# Patient Record
Sex: FEMALE | Race: BLACK OR AFRICAN AMERICAN | NOT HISPANIC OR LATINO | Employment: FULL TIME | URBAN - METROPOLITAN AREA
[De-identification: names, ages, dates, MRNs, and addresses within clinical notes are randomized per-mention and may not be internally consistent; named-entity substitution may affect disease eponyms.]

---

## 2017-05-03 ENCOUNTER — PATIENT MESSAGE (OUTPATIENT)
Dept: FAMILY MEDICINE | Facility: CLINIC | Age: 57
End: 2017-05-03

## 2017-05-03 ENCOUNTER — OFFICE VISIT (OUTPATIENT)
Dept: FAMILY MEDICINE | Facility: CLINIC | Age: 57
End: 2017-05-03
Payer: COMMERCIAL

## 2017-05-03 ENCOUNTER — LAB VISIT (OUTPATIENT)
Dept: LAB | Facility: HOSPITAL | Age: 57
End: 2017-05-03
Attending: INTERNAL MEDICINE
Payer: COMMERCIAL

## 2017-05-03 VITALS
HEART RATE: 71 BPM | BODY MASS INDEX: 29.1 KG/M2 | DIASTOLIC BLOOD PRESSURE: 96 MMHG | HEIGHT: 68 IN | TEMPERATURE: 98 F | WEIGHT: 192 LBS | OXYGEN SATURATION: 96 % | SYSTOLIC BLOOD PRESSURE: 148 MMHG

## 2017-05-03 DIAGNOSIS — Z00.00 ROUTINE MEDICAL EXAM: Primary | ICD-10-CM

## 2017-05-03 DIAGNOSIS — Z00.00 ROUTINE MEDICAL EXAM: ICD-10-CM

## 2017-05-03 DIAGNOSIS — K21.9 GERD WITHOUT ESOPHAGITIS: ICD-10-CM

## 2017-05-03 DIAGNOSIS — E78.00 PURE HYPERCHOLESTEROLEMIA: ICD-10-CM

## 2017-05-03 DIAGNOSIS — L30.9 DERMATITIS: ICD-10-CM

## 2017-05-03 DIAGNOSIS — E66.3 OVERWEIGHT (BMI 25.0-29.9): ICD-10-CM

## 2017-05-03 DIAGNOSIS — I10 ESSENTIAL (PRIMARY) HYPERTENSION: ICD-10-CM

## 2017-05-03 LAB
ALBUMIN SERPL BCP-MCNC: 3.9 G/DL
ALP SERPL-CCNC: 90 U/L
ALT SERPL W/O P-5'-P-CCNC: 13 U/L
ANION GAP SERPL CALC-SCNC: 6 MMOL/L
AST SERPL-CCNC: 18 U/L
BASOPHILS # BLD AUTO: 0.02 K/UL
BASOPHILS NFR BLD: 0.4 %
BILIRUB SERPL-MCNC: 0.6 MG/DL
BUN SERPL-MCNC: 13 MG/DL
CALCIUM SERPL-MCNC: 9.8 MG/DL
CHLORIDE SERPL-SCNC: 108 MMOL/L
CHOLEST/HDLC SERPL: 3.4 {RATIO}
CO2 SERPL-SCNC: 28 MMOL/L
CREAT SERPL-MCNC: 0.9 MG/DL
DIFFERENTIAL METHOD: ABNORMAL
EOSINOPHIL # BLD AUTO: 0.2 K/UL
EOSINOPHIL NFR BLD: 4.6 %
ERYTHROCYTE [DISTWIDTH] IN BLOOD BY AUTOMATED COUNT: 16.6 %
EST. GFR  (AFRICAN AMERICAN): >60 ML/MIN/1.73 M^2
EST. GFR  (NON AFRICAN AMERICAN): >60 ML/MIN/1.73 M^2
GLUCOSE SERPL-MCNC: 88 MG/DL
HCT VFR BLD AUTO: 37.7 %
HDL/CHOLESTEROL RATIO: 29.3 %
HDLC SERPL-MCNC: 242 MG/DL
HDLC SERPL-MCNC: 71 MG/DL
HGB BLD-MCNC: 11.6 G/DL
LDLC SERPL CALC-MCNC: 152.4 MG/DL
LYMPHOCYTES # BLD AUTO: 1.5 K/UL
LYMPHOCYTES NFR BLD: 30.8 %
MCH RBC QN AUTO: 23 PG
MCHC RBC AUTO-ENTMCNC: 30.8 %
MCV RBC AUTO: 75 FL
MONOCYTES # BLD AUTO: 0.4 K/UL
MONOCYTES NFR BLD: 9.1 %
NEUTROPHILS # BLD AUTO: 2.7 K/UL
NEUTROPHILS NFR BLD: 55.1 %
NONHDLC SERPL-MCNC: 171 MG/DL
PLATELET # BLD AUTO: 310 K/UL
PMV BLD AUTO: 10.6 FL
POTASSIUM SERPL-SCNC: 4.5 MMOL/L
PROT SERPL-MCNC: 7.4 G/DL
RBC # BLD AUTO: 5.05 M/UL
SODIUM SERPL-SCNC: 142 MMOL/L
TRIGL SERPL-MCNC: 93 MG/DL
WBC # BLD AUTO: 4.83 K/UL

## 2017-05-03 PROCEDURE — 99396 PREV VISIT EST AGE 40-64: CPT | Mod: 25,S$GLB,, | Performed by: INTERNAL MEDICINE

## 2017-05-03 PROCEDURE — 99999 PR PBB SHADOW E&M-EST. PATIENT-LVL III: CPT | Mod: PBBFAC,,, | Performed by: INTERNAL MEDICINE

## 2017-05-03 PROCEDURE — 36415 COLL VENOUS BLD VENIPUNCTURE: CPT | Mod: PO

## 2017-05-03 PROCEDURE — 85025 COMPLETE CBC W/AUTO DIFF WBC: CPT

## 2017-05-03 PROCEDURE — 3077F SYST BP >= 140 MM HG: CPT | Mod: S$GLB,,, | Performed by: INTERNAL MEDICINE

## 2017-05-03 PROCEDURE — 80053 COMPREHEN METABOLIC PANEL: CPT

## 2017-05-03 PROCEDURE — 80061 LIPID PANEL: CPT

## 2017-05-03 PROCEDURE — 3080F DIAST BP >= 90 MM HG: CPT | Mod: S$GLB,,, | Performed by: INTERNAL MEDICINE

## 2017-05-03 RX ORDER — PANTOPRAZOLE SODIUM 20 MG/1
20 TABLET, DELAYED RELEASE ORAL DAILY
Qty: 90 TABLET | Refills: 3 | Status: SHIPPED | OUTPATIENT
Start: 2017-05-03 | End: 2017-06-05

## 2017-05-03 RX ORDER — LISINOPRIL 40 MG/1
40 TABLET ORAL DAILY
Qty: 90 TABLET | Refills: 3 | Status: SHIPPED | OUTPATIENT
Start: 2017-05-03 | End: 2018-05-08 | Stop reason: SDUPTHER

## 2017-05-03 RX ORDER — CARVEDILOL 3.12 MG/1
3.12 TABLET ORAL 2 TIMES DAILY WITH MEALS
Qty: 180 TABLET | Refills: 0 | Status: SHIPPED | OUTPATIENT
Start: 2017-05-03 | End: 2017-05-17

## 2017-05-03 RX ORDER — AMLODIPINE BESYLATE 10 MG/1
10 TABLET ORAL DAILY
Qty: 90 TABLET | Refills: 3 | Status: SHIPPED | OUTPATIENT
Start: 2017-05-03 | End: 2018-05-08 | Stop reason: SDUPTHER

## 2017-05-03 NOTE — MR AVS SNAPSHOT
[facility]  [address]  [place] [address]  Phone: [phone]  Fax: [phone]                  Kendra Griffin   5/3/2017 7:40 AM   Office Visit    Description:  Female : 1960   Provider:  Armando Knox MD   Department:  [facility]           Reason for Visit     Annual Exam           Diagnoses this Visit        Comments    Routine medical exam    -  Primary     Essential (primary) hypertension         GERD without esophagitis         Pure hypercholesterolemia         Overweight (BMI 25.0-29.9)                To Do List           Future Appointments        Provider Department Dept Phone    5/3/2017 8:30 AM LAB, [place] [facility] [phone]    2017 7:40 AM Markie Arguello NP [facility] [phone]      Goals (5 Years of Data)     None      Follow-Up and Disposition     Return in about 2 weeks (around 2017) for BP check with NP.    Follow-up and Disposition History       These Medications        Disp Refills Start End    lisinopril (PRINIVIL,ZESTRIL) 40 MG tablet 90 tablet 3 5/3/2017     Take 1 tablet (40 mg total) by mouth once daily. - Oral    Pharmacy: [address] Ph #: [phone]       amlodipine (NORVASC) 10 MG tablet 90 tablet 3 5/3/2017     Take 1 tablet (10 mg total) by mouth once daily. - Oral    Pharmacy: [facility] [address] [place] [address] Ph #: [phone]       carvedilol (COREG) 3.125 MG tablet 180 tablet 0 5/3/2017 5/3/2018    Take 1 tablet (3.125 mg total) by mouth 2 (two) times daily with meals. - Oral    Pharmacy: [address] Ph #: [phone]       pantoprazole (PROTONIX) 20 MG tablet 90 tablet 3 5/3/2017     Take 1 tablet (20 mg total) by mouth once daily. - Oral    Pharmacy: [address] Ph #: [phone]          [facility]     [facility] - 24/7 Assistance  Unless otherwise directed by your provider, please contact [facility], our nurse care line that is available for 24/7 assistance.     Registered nurses in the [facility] provide: appointment scheduling, clinical advisement, health education, and other advisory services.  Call: [phone] (toll free)               Medications           Message regarding Medications     Verify the changes and/or additions to your medication regime listed below are the same as discussed with your clinician today.  If any of these changes or additions are incorrect, please notify your healthcare provider.        STOP taking these medications     escitalopram oxalate (LEXAPRO) 20 MG tablet Take 1 tablet (20 mg total) by mouth once daily. Crack 1st 3 tabs in half and take half tab daily for 6 days then increase to full tab    clonazePAM (KLONOPIN) 0.5 MG tablet Take 1 tablet (0.5 mg total) by mouth 2 (two) times daily as needed for Anxiety.           Verify that the below list of medications is an accurate representation of the medications you are currently taking.  If none reported, the list may be blank. If incorrect, please contact your healthcare provider. Carry this list with you in case of emergency.           Current Medications     amlodipine (NORVASC) 10 MG tablet Take 1 tablet (10 mg total) by mouth once daily.    aspirin 81 MG Chew Take 81 mg by mouth once daily.    carvedilol (COREG) 3.125 MG tablet Take 1 tablet (3.125 mg total) by mouth 2 (two) times daily with meals.    dicyclomine (BENTYL) 20 mg tablet Take 1 tablet (20 mg total) by mouth 4 (four) times daily as needed (abdominal cramping).    lisinopril (PRINIVIL,ZESTRIL) 40 MG tablet Take 1 tablet (40 mg total) by mouth once daily.    pantoprazole (PROTONIX) 20 MG tablet Take 1 tablet (20 mg total) by mouth once daily.    atorvastatin (LIPITOR) 20 MG tablet Take 1 tablet (20 mg  "total) by mouth once daily.    fluocinonide (LIDEX) 0.05 % ointment Apply topically 2 (two) times daily. Apply thin amount to arms only.  Do Not Apply to other locations           Clinical Reference Information           Your Vitals Were     BP Pulse Temp Height Weight SpO2    148/96 71 97.9 °F (36.6 °C) (Oral) 5' 7.5" (1.715 m) 87.1 kg (192 lb 0.3 oz) 96%    BMI                29.63 kg/m2          Blood Pressure          Most Recent Value    BP  (!)  148/96      Allergies as of 5/3/2017     No Known Allergies      Immunizations Administered on Date of Encounter - 5/3/2017     None      Orders Placed During Today's Visit     Future Labs/Procedures Expected by Expires    CBC auto differential  5/3/2017 5/3/2018    Comprehensive metabolic panel  5/3/2017 5/3/2018    Lipid panel  5/3/2017 5/3/2018      Instructions    The lisinopril and the amlodipine is once a day.     The carvedilol is twice a day.      We are trying to stop the fluid pill entirely.      I will be in touch about the lab results.     If your home blood pressures are not <140/90, then start taking 2 carvedilol tabs in the AM and 2 tabs in the PM       Language Assistance Services     ATTENTION: Language assistance services are available, free of charge. Please call [phone].      ATENCIÓN: Si habla ashañol, tiene a aaron disposición servicios gratuitos de asistencia lingüística. Llame al [phone].     CHÚ Ý: N?u b?n nói Ti?ng Vi?t, có các d?ch v? h? tr? ngôn ng? mi?n phí dành cho b?n. G?i s? [phone].         [facility] complies with applicable Federal civil rights laws and does not discriminate on the basis of race, color, national origin, age, disability, or sex.        "

## 2017-05-03 NOTE — ASSESSMENT & PLAN NOTE
Counseled on need to take all of her medication as directed and read the labels on her scripts.  She is interested in the digital HTN program if cuff affordable.  She will return to see VIGNESH in 2 weeks for BP check.  I counseled her on increasing her BB if needed.  Avoiding diuretic due to issues at work with urinary frequency.
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
## 2017-05-03 NOTE — PATIENT INSTRUCTIONS
The lisinopril and the amlodipine is once a day.     The carvedilol is twice a day.      We are trying to stop the fluid pill entirely.      I will be in touch about the lab results.     If your home blood pressures are not <140/90, then start taking 2 carvedilol tabs in the AM and 2 tabs in the PM

## 2017-05-04 NOTE — PROGRESS NOTES
Your lab results are improving.  It is ok to hold the cholesterol medicine (atorvastatin for now)  Keep up the great work with diet and exercise; this is helping!  Please feel free to contact me with any questions or concerns.    Sincerely,  Armando Knox  [email or web address]

## 2017-05-08 ENCOUNTER — PATIENT MESSAGE (OUTPATIENT)
Dept: ADMINISTRATIVE | Facility: OTHER | Age: 57
End: 2017-05-08

## 2017-05-10 ENCOUNTER — PATIENT OUTREACH (OUTPATIENT)
Dept: OTHER | Facility: OTHER | Age: 57
End: 2017-05-10

## 2017-05-10 NOTE — LETTER
Michelle Schumacher  [address] [place] [address]     Dear Kendra,    Thank you for enrolling in the [facility]. To participate in our program, we ask that you submit a blood pressure reading at least once weekly through your [facility] Account and maintain regular contact with your Care Team.  We have not received any data or heard from you in some time.     The Digital Medicine Care Team has attempted to reach you on multiple occasions to determine if you would like to continue participating in the program. While we encourage you to continue participating fully, we understand that circumstances may change.      To continue participating in the program, please contact me at [phone]. If we do not hear back, you will be un-enrolled and your physician will be notified of your decision.    If you have submitted blood pressure readings during the past 1 days and believe you are receiving this letter in error, please call the Digital Medicine Patient Support Line at [phone] for troubleshooting.      We look forward to hearing from you soon.    Sincerely,     Michelle Schumacher  Your Personal Health                                                                                                                         Kendra Griffin  [address] [place] [address]

## 2017-05-10 NOTE — PROGRESS NOTES
Last 5 Patient Entered Readings                                                               Current 30 Day Average: /     Recent Readings 5/9/2017 5/9/2017 5/8/2017 5/8/2017    Systolic BP (mmHg) 129 119 143 118    Diastolic BP (mmHg) 88 86 86 89    Pulse 75 82 72 65      Called to introduce Ms. Griffin to introduce her to the Bp monitoring program. Phone rang and then had busy signal. Will try again later this week.

## 2017-05-12 ENCOUNTER — TELEPHONE (OUTPATIENT)
Dept: FAMILY MEDICINE | Facility: CLINIC | Age: 57
End: 2017-05-12

## 2017-05-12 NOTE — PROGRESS NOTES
Last 5 Patient Entered Readings                                                               Current 30 Day Average: /     Recent Readings 5/11/2017 5/11/2017 5/10/2017 5/9/2017 5/9/2017    Systolic BP (mmHg) 140 138 149 129 119    Diastolic BP (mmHg) 80 90 90 88 86    Pulse 79 63 64 75 82      Phone number listed still beeping busy. Will attempt to contact via [employer] for introduction.

## 2017-05-12 NOTE — LETTER
May 12, 2017    [place] [id number]             [facility]  [address]  [place] [address]  Phone: [phone]  Fax: [phone] Dear Ms. Griffin:    I have been unable to reach you by phone for your appointment to Dermatology. Please call me at the clinic [phone] to book your appointment.        If you have any questions or concerns, please don't hesitate to call.    Sincerely,        Manisha Petit MA

## 2017-05-17 ENCOUNTER — OFFICE VISIT (OUTPATIENT)
Dept: FAMILY MEDICINE | Facility: CLINIC | Age: 57
End: 2017-05-17
Payer: COMMERCIAL

## 2017-05-17 VITALS
OXYGEN SATURATION: 98 % | HEIGHT: 67 IN | DIASTOLIC BLOOD PRESSURE: 84 MMHG | BODY MASS INDEX: 29.83 KG/M2 | SYSTOLIC BLOOD PRESSURE: 128 MMHG | WEIGHT: 190.06 LBS | TEMPERATURE: 98 F | HEART RATE: 79 BPM

## 2017-05-17 DIAGNOSIS — I10 ESSENTIAL (PRIMARY) HYPERTENSION: Primary | ICD-10-CM

## 2017-05-17 DIAGNOSIS — R09.89 PULSE IRREGULARITY: ICD-10-CM

## 2017-05-17 PROCEDURE — 99213 OFFICE O/P EST LOW 20 MIN: CPT | Mod: S$GLB,,, | Performed by: NURSE PRACTITIONER

## 2017-05-17 PROCEDURE — 3074F SYST BP LT 130 MM HG: CPT | Mod: S$GLB,,, | Performed by: NURSE PRACTITIONER

## 2017-05-17 PROCEDURE — 93010 ELECTROCARDIOGRAM REPORT: CPT | Mod: S$GLB,,, | Performed by: INTERNAL MEDICINE

## 2017-05-17 PROCEDURE — 3079F DIAST BP 80-89 MM HG: CPT | Mod: S$GLB,,, | Performed by: NURSE PRACTITIONER

## 2017-05-17 PROCEDURE — 93005 ELECTROCARDIOGRAM TRACING: CPT | Mod: S$GLB,,, | Performed by: NURSE PRACTITIONER

## 2017-05-17 PROCEDURE — 99999 PR PBB SHADOW E&M-EST. PATIENT-LVL IV: CPT | Mod: PBBFAC,,, | Performed by: NURSE PRACTITIONER

## 2017-05-17 PROCEDURE — 1160F RVW MEDS BY RX/DR IN RCRD: CPT | Mod: S$GLB,,, | Performed by: NURSE PRACTITIONER

## 2017-05-17 NOTE — PATIENT INSTRUCTIONS
Continue to Monitor blood pressure.  Continue diet and exercise to control blood pressure.  Avoid salt. Avoid fried and fatty foods.

## 2017-05-17 NOTE — MR AVS SNAPSHOT
[facility]  [address]  [place] [address]  Phone: [phone]  Fax: [phone]                  Kendra Griffin   2017 7:40 AM   Office Visit    Description:  Female : 1960   Provider:  Markie Arguello NP   Department:  [facility]           Reason for Visit     Hypertension           Diagnoses this Visit        Comments    Essential (primary) hypertension    -  Primary     Pulse irregularity                To Do List           Goals (5 Years of Data)              Today    17    Blood Pressure <= 140/90   128/84  128/84  128/84    Notes - Note created  5/10/2017  9:50 AM by Michelle ABARCA    It is important to consistently maintain a controlled blood pressure.      Exercise at least 150 minutes per week.           Maintain a low sodium diet           Notes - Note created  5/10/2017  9:58 AM by Michelle ABARCA    Limit to 2,000 mg sodium per day total.       Take at least one BP reading per week at various times of the day             Follow-Up and Disposition     Return in about 3 months (around 2017).      [facility]     [facility] -  Assistance  Unless otherwise directed by your provider, please contact [facility], our nurse care line that is available for  assistance.     Registered nurses in the [facility] provide: appointment scheduling, clinical advisement, health education, and other advisory services.  Call: [phone] (toll free)               Medications           Message regarding Medications     Verify the changes and/or additions to your medication regime listed below are the same as discussed with your clinician today.  If any of these changes or additions are incorrect, please notify your healthcare provider.        STOP taking these medications     carvedilol (COREG) 3.125 MG tablet Take 1 tablet (3.125 mg total) by mouth 2 (two) times daily with meals.     "       Verify that the below list of medications is an accurate representation of the medications you are currently taking.  If none reported, the list may be blank. If incorrect, please contact your healthcare provider. Carry this list with you in case of emergency.           Current Medications     amlodipine (NORVASC) 10 MG tablet Take 1 tablet (10 mg total) by mouth once daily.    aspirin 81 MG Chew Take 81 mg by mouth once daily.    atorvastatin (LIPITOR) 20 MG tablet Take 1 tablet (20 mg total) by mouth once daily.    dicyclomine (BENTYL) 20 mg tablet Take 1 tablet (20 mg total) by mouth 4 (four) times daily as needed (abdominal cramping).    lisinopril (PRINIVIL,ZESTRIL) 40 MG tablet Take 1 tablet (40 mg total) by mouth once daily.    pantoprazole (PROTONIX) 20 MG tablet Take 1 tablet (20 mg total) by mouth once daily.    fluocinonide (LIDEX) 0.05 % ointment Apply topically 2 (two) times daily. Apply thin amount to arms only.  Do Not Apply to other locations           Clinical Reference Information           Your Vitals Were     BP Pulse Temp Height Weight SpO2    128/84 (BP Location: Right arm, Patient Position: Sitting, BP Method: Manual) 79 98.2 °F (36.8 °C) 5' 7" (1.702 m) 86.2 kg (190 lb 0.6 oz) 98%    BMI                29.76 kg/m2          Blood Pressure          Most Recent Value    BP  128/84      Allergies as of 5/17/2017     No Known Allergies      Immunizations Administered on Date of Encounter - 5/17/2017     None      Orders Placed During Today's Visit      Normal Orders This Visit    EKG 12-lead       Instructions    Continue to Monitor blood pressure.  Continue diet and exercise to control blood pressure.  Avoid salt. Avoid fried and fatty foods.        Language Assistance Services     ATTENTION: Language assistance services are available, free of charge. Please call [phone].      ATENCIÓN: Si habla bill, tiene a aaron disposición servicios gratuitos de asistencia lingüística. Llame al " [phone].     [place] Ý: N?u b?n nói Ti?ng Vi?t, có các d?ch v? h? tr? ngôn ng? mi?n phí dành cho b?n. G?i s? [phone].         [facility] complies with applicable Federal civil rights laws and does not discriminate on the basis of race, color, national origin, age, disability, or sex.
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
## 2017-05-19 NOTE — PROGRESS NOTES
Last 5 Patient Entered Readings                                                               Current 30 Day Average: /     Recent Readings 5/18/2017 5/18/2017 5/17/2017 5/17/2017 5/16/2017    Systolic BP (mmHg) 125 128 120 133 143    Diastolic BP (mmHg) 90 95 81 91 97    Pulse 80 75 69 71 72      After multiple attempts, [facility] has still not been able to reach pt for introduction. She will be sent a letter today and have 2 weeks to respond before being removed from the program.

## 2017-05-22 ENCOUNTER — PATIENT MESSAGE (OUTPATIENT)
Dept: FAMILY MEDICINE | Facility: CLINIC | Age: 57
End: 2017-05-22

## 2017-05-22 DIAGNOSIS — I49.9 CARDIAC ARRHYTHMIA, UNSPECIFIED CARDIAC ARRHYTHMIA TYPE: Primary | ICD-10-CM

## 2017-05-23 ENCOUNTER — PATIENT MESSAGE (OUTPATIENT)
Dept: FAMILY MEDICINE | Facility: CLINIC | Age: 57
End: 2017-05-23

## 2017-05-24 ENCOUNTER — TELEPHONE (OUTPATIENT)
Dept: FAMILY MEDICINE | Facility: CLINIC | Age: 57
End: 2017-05-24

## 2017-05-24 NOTE — LETTER
May 25, 2017    [place] [id number]             [facility]  [address]  [place] [address]  Phone: [phone]  Fax: [phone] Dear Ms. Griffin:    Sorry we were unable to contact you to schedule your cardiology appointment. Please give the referral office a call to schedule. [phone].        If you have any questions or concerns, please don't hesitate to call.    Sincerely,        Alejandrina Peters MA

## 2017-06-05 ENCOUNTER — INITIAL CONSULT (OUTPATIENT)
Dept: CARDIOLOGY | Facility: CLINIC | Age: 57
End: 2017-06-05
Payer: COMMERCIAL

## 2017-06-05 VITALS
BODY MASS INDEX: 29.98 KG/M2 | HEIGHT: 67 IN | HEART RATE: 77 BPM | SYSTOLIC BLOOD PRESSURE: 157 MMHG | OXYGEN SATURATION: 96 % | DIASTOLIC BLOOD PRESSURE: 92 MMHG | WEIGHT: 191 LBS

## 2017-06-05 DIAGNOSIS — I10 ESSENTIAL (PRIMARY) HYPERTENSION: ICD-10-CM

## 2017-06-05 DIAGNOSIS — R94.31 ABNORMAL EKG: ICD-10-CM

## 2017-06-05 DIAGNOSIS — E78.2 MIXED HYPERLIPIDEMIA: ICD-10-CM

## 2017-06-05 DIAGNOSIS — R06.02 SHORTNESS OF BREATH: Primary | ICD-10-CM

## 2017-06-05 DIAGNOSIS — R42 DIZZINESS: ICD-10-CM

## 2017-06-05 PROCEDURE — 99999 PR PBB SHADOW E&M-EST. PATIENT-LVL III: CPT | Mod: PBBFAC,,, | Performed by: INTERNAL MEDICINE

## 2017-06-05 PROCEDURE — 99204 OFFICE O/P NEW MOD 45 MIN: CPT | Mod: S$GLB,,, | Performed by: INTERNAL MEDICINE
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
## 2017-06-05 NOTE — LETTER
June 5, 2017      Markie Arguello, ANMOL  [address]  [place] [address]           [facility]  [address]  [place] [address]  Phone: [phone]          Patient: Kendra Griffin   MR Number: [id number]   YOB: 1960   Date of Visit: 6/5/2017       Dear Markie Arguello:    Thank you for referring Kendra Griffin to me for evaluation. Attached you will find relevant portions of my assessment and plan of care.    If you have questions, please do not hesitate to call me. I look forward to following Kendra Griffin along with you.    Sincerely,    Silver Ayala MD    Enclosure  CC:  No Recipients    If you would like to receive this communication electronically, please contact [email or web address] or [phone] to request more information on [employer] Link access.    For providers and/or their staff who would like to refer a patient to [facility], please contact us through our one-stop-shop provider referral line, [facility] , at [phone].    If you feel you have received this communication in error or would no longer like to receive these types of communications, please e-mail [email or web address]

## 2017-06-15 ENCOUNTER — HOSPITAL ENCOUNTER (OUTPATIENT)
Dept: CARDIOLOGY | Facility: HOSPITAL | Age: 57
Discharge: HOME | End: 2017-06-15
Attending: INTERNAL MEDICINE
Payer: COMMERCIAL

## 2017-06-15 ENCOUNTER — HOSPITAL ENCOUNTER (OUTPATIENT)
Dept: RADIOLOGY | Facility: HOSPITAL | Age: 57
Discharge: HOME | End: 2017-06-15
Attending: INTERNAL MEDICINE
Payer: COMMERCIAL

## 2017-06-15 DIAGNOSIS — R06.02 SHORTNESS OF BREATH: ICD-10-CM

## 2017-06-15 DIAGNOSIS — R42 DIZZINESS: ICD-10-CM

## 2017-06-15 DIAGNOSIS — R94.31 ABNORMAL EKG: ICD-10-CM

## 2017-06-15 LAB
AORTIC VALVE REGURGITATION: NORMAL
DIASTOLIC DYSFUNCTION: NO
ESTIMATED PA SYSTOLIC PRESSURE: 16.48
GLOBAL PERICARDIAL EFFUSION: NORMAL
MITRAL VALVE MOBILITY: NORMAL
MITRAL VALVE REGURGITATION: NORMAL
RETIRED EF AND QEF - SEE NOTES: 60 (ref 55–65)
TRICUSPID VALVE REGURGITATION: NORMAL

## 2017-06-15 PROCEDURE — 93306 TTE W/DOPPLER COMPLETE: CPT

## 2017-06-15 PROCEDURE — 93018 CV STRESS TEST I&R ONLY: CPT | Mod: ,,, | Performed by: INTERNAL MEDICINE

## 2017-06-15 PROCEDURE — 78452 HT MUSCLE IMAGE SPECT MULT: CPT | Mod: TC

## 2017-06-15 PROCEDURE — 93016 CV STRESS TEST SUPVJ ONLY: CPT | Mod: ,,, | Performed by: INTERNAL MEDICINE

## 2017-06-15 PROCEDURE — 93225 XTRNL ECG REC<48 HRS REC: CPT

## 2017-06-15 PROCEDURE — 93306 TTE W/DOPPLER COMPLETE: CPT | Mod: 26,,, | Performed by: INTERNAL MEDICINE

## 2017-06-15 PROCEDURE — 93227 XTRNL ECG REC<48 HR R&I: CPT | Mod: ,,, | Performed by: INTERNAL MEDICINE

## 2017-06-15 PROCEDURE — 78452 HT MUSCLE IMAGE SPECT MULT: CPT | Mod: 26,,, | Performed by: INTERNAL MEDICINE

## 2017-06-15 PROCEDURE — 93017 CV STRESS TEST TRACING ONLY: CPT

## 2017-06-16 ENCOUNTER — PATIENT MESSAGE (OUTPATIENT)
Dept: CARDIOLOGY | Facility: CLINIC | Age: 57
End: 2017-06-16

## 2017-11-04 ENCOUNTER — OFFICE VISIT (OUTPATIENT)
Dept: URGENT CARE | Facility: CLINIC | Age: 57
End: 2017-11-04
Payer: COMMERCIAL

## 2017-11-04 VITALS
TEMPERATURE: 98 F | RESPIRATION RATE: 18 BRPM | WEIGHT: 191 LBS | HEART RATE: 73 BPM | DIASTOLIC BLOOD PRESSURE: 98 MMHG | BODY MASS INDEX: 29.98 KG/M2 | HEIGHT: 67 IN | SYSTOLIC BLOOD PRESSURE: 145 MMHG | OXYGEN SATURATION: 97 %

## 2017-11-04 DIAGNOSIS — K04.7 DENTAL ABSCESS: Primary | ICD-10-CM

## 2017-11-04 PROCEDURE — 99214 OFFICE O/P EST MOD 30 MIN: CPT | Mod: S$GLB,,, | Performed by: PHYSICIAN ASSISTANT

## 2017-11-04 RX ORDER — AMOXICILLIN AND CLAVULANATE POTASSIUM 875; 125 MG/1; MG/1
1 TABLET, FILM COATED ORAL 2 TIMES DAILY
Qty: 20 TABLET | Refills: 0 | Status: SHIPPED | OUTPATIENT
Start: 2017-11-04 | End: 2017-11-14

## 2017-11-04 RX ORDER — HYDROCODONE BITARTRATE AND ACETAMINOPHEN 7.5; 325 MG/1; MG/1
1 TABLET ORAL EVERY 6 HOURS PRN
Qty: 9 TABLET | Refills: 0 | Status: SHIPPED | OUTPATIENT
Start: 2017-11-04 | End: 2018-05-08

## 2017-11-04 NOTE — PROGRESS NOTES
"Subjective:       Patient ID: Kendra Griffin is a 57 y.o. female.    Vitals:  height is 5' 7" (1.702 m) and weight is 86.6 kg (191 lb). Her temperature is 98.1 °F (36.7 °C). Her blood pressure is 145/98 (abnormal) and her pulse is 73. Her respiration is 18 and oxygen saturation is 97%.     Chief Complaint: Abscess and Jaw Pain    This is a 57 y.o. female with Past Medical History:  2008: Gastric ulcer due to Helicobacter pylori      Comment: Dr. Aly  No date: Hypertension  6/19/2015: Microcytic anemia   who presents today with a chief complaint of an abcess in her mouth.         Abscess   Chronicity:  New  Onset:  3-5 days ago  Progression Since Onset: rapidly worsening  Location:  Mouth  Associated Symptoms: no fever, no chills, sweats  Characteristics: painful and swelling    Characteristics: not draining    Pain Scale:  6/10  Treatments Tried:  Nothing  Relieved by:  Nothing  Exacerbated by: eating     Review of Systems   Constitution: Negative for chills and fever.   HENT: Negative for sore throat.    Eyes: Negative for blurred vision.   Cardiovascular: Negative for chest pain.   Respiratory: Negative for shortness of breath.    Skin: Negative for rash.   Musculoskeletal: Negative for back pain and joint pain.   Gastrointestinal: Negative for abdominal pain, diarrhea, nausea and vomiting.   Neurological: Negative for headaches.   Psychiatric/Behavioral: The patient is not nervous/anxious.        Objective:      Physical Exam   Constitutional: She is oriented to person, place, and time. She appears well-developed and well-nourished. She is cooperative.  Non-toxic appearance. She does not appear ill. No distress.   HENT:   Head: Normocephalic and atraumatic. Head is without raccoon's eyes, without Brown's sign, without laceration, without right periorbital erythema and without left periorbital erythema.       Right Ear: Hearing, tympanic membrane, external ear and ear canal normal.   Left Ear: Hearing, tympanic " membrane, external ear and ear canal normal.   Nose: Nose normal. No mucosal edema, rhinorrhea or nasal deformity. No epistaxis. Right sinus exhibits no maxillary sinus tenderness and no frontal sinus tenderness. Left sinus exhibits no maxillary sinus tenderness and no frontal sinus tenderness.   Mouth/Throat: Uvula is midline, oropharynx is clear and moist and mucous membranes are normal. She does not have dentures. No oral lesions. No trismus in the jaw. Normal dentition. Dental abscesses present. No uvula swelling, lacerations or dental caries. No posterior oropharyngeal erythema. No tonsillar exudate.       Edema noted over region of periapical dental abscess   Eyes: Conjunctivae and lids are normal. No scleral icterus.   Sclera clear bilat   Neck: Trachea normal, full passive range of motion without pain and phonation normal. Neck supple.   Cardiovascular: Normal rate, regular rhythm, normal heart sounds, intact distal pulses and normal pulses.    Pulmonary/Chest: Effort normal and breath sounds normal. No respiratory distress.   Abdominal: Soft. Normal appearance and bowel sounds are normal. She exhibits no distension. There is no tenderness.   Musculoskeletal: Normal range of motion. She exhibits no edema or deformity.   Neurological: She is alert and oriented to person, place, and time. She exhibits normal muscle tone. Coordination normal.   Skin: Skin is warm, dry and intact. She is not diaphoretic. No pallor.   Psychiatric: She has a normal mood and affect. Her speech is normal and behavior is normal. Judgment and thought content normal. Cognition and memory are normal.   Nursing note and vitals reviewed.      Assessment:       1. Dental abscess        Plan:         Dental abscess  -     amoxicillin-clavulanate 875-125mg (AUGMENTIN) 875-125 mg per tablet; Take 1 tablet by mouth 2 (two) times daily.  Dispense: 20 tablet; Refill: 0  -     hydrocodone-acetaminophen 7.5-325mg (NORCO) 7.5-325 mg per tablet;  "Take 1 tablet by mouth every 6 (six) hours as needed for Pain.  Dispense: 9 tablet; Refill: 0        Dental Abscess    An abscess is a pocket of pus at the tip of a tooth root in your jaw bone. It is caused by an infection at the root of the tooth. It can cause pain and swelling of the gum, cheek, or jaw. Pain may spread from the tooth to your ear or the area of your jaw on the same side. If the abscess isnt treated, it appears as a bubble or swelling on the gum near the tooth. The pressure that builds in this swelling is the source of the pain. More serious infections cause your face to swell.  An abscess can be caused by a crack in the tooth, a cavity, a gum infection, or a combination of these. Once the pulp of the tooth is exposed, bacteria can spread down the roots to the tip. If the bacteria are not stopped, they can damage the bone and soft tissue, and an abscess can form.  Home care  Follow these guidelines when caring for yourself at home:  · Avoid hot and cold foods and drinks. Your tooth may be sensitive to changes in temperature. Dont chew on the side of the infected tooth.  · If your tooth is chipped or cracked, or if there is a large open cavity, put oil of cloves directly on the tooth to relieve pain. You can buy oil of cloves at drugstores. Some pharmacies carry an over-the-counter "toothache kit." This contains a paste that you can put on the exposed tooth to make it less sensitive.  · Put a cold pack on your jaw over the sore area to help reduce pain.  · You may use over-the-counter medicine to ease pain, unless another medicine was prescribed. If you have chronic liver or kidney disease, talk with your healthcare provider before using acetaminophen or ibuprofen. Also talk with your provider if youve had a stomach ulcer or GI bleeding.  · An antibiotic will be prescribed. Take it until finished, even if you are feeling better after a few days.  Follow-up care  Follow up with your dentist or an " oral surgeon, or as advised. Once an infection occurs in a tooth, it will continue to be a problem until the infection is drained. This is done through surgery or a root canal. Or you may need to have your tooth pulled.  Call 911  Call 911 if any of these occur:  · Unusual drowsiness  · Headache or stiff neck  · Weakness or fainting  · Difficulty swallowing, breathing, or opening your mouth  · Swollen eyelids  When to seek medical advice  Call your healthcare provider right away if any of these occur:  · Your face becomes more swollen or red  · Pain gets worse or spreads to your neck  · Fever of 100.4º F (38.0º C) or higher, or as directed by your healthcare provider  · Pus drains from the tooth  Date Last Reviewed: 10/1/2016  © 1704-7706 [employer]. [address] [place] [address], [place] [address]. All rights reserved. This information is not intended as a substitute for professional medical care. Always follow your healthcare professional's instructions.      Follow UP with your dentist as discussed    Please follow up with your Primary care provider within 2-5 days if your signs and symptoms have not resolved or worsen.     If your condition worsens or fails to improve we recommend that you receive another evaluation at the emergency room immediately or contact your primary medical clinic to discuss your concerns.   You must understand that you have received an Urgent Care treatment only and that you may be released before all of your medical problems are known or treated. You, the patient, will arrange for follow up care as instructed.

## 2017-11-04 NOTE — PATIENT INSTRUCTIONS
"  Dental Abscess    An abscess is a pocket of pus at the tip of a tooth root in your jaw bone. It is caused by an infection at the root of the tooth. It can cause pain and swelling of the gum, cheek, or jaw. Pain may spread from the tooth to your ear or the area of your jaw on the same side. If the abscess isnt treated, it appears as a bubble or swelling on the gum near the tooth. The pressure that builds in this swelling is the source of the pain. More serious infections cause your face to swell.  An abscess can be caused by a crack in the tooth, a cavity, a gum infection, or a combination of these. Once the pulp of the tooth is exposed, bacteria can spread down the roots to the tip. If the bacteria are not stopped, they can damage the bone and soft tissue, and an abscess can form.  Home care  Follow these guidelines when caring for yourself at home:  · Avoid hot and cold foods and drinks. Your tooth may be sensitive to changes in temperature. Dont chew on the side of the infected tooth.  · If your tooth is chipped or cracked, or if there is a large open cavity, put oil of cloves directly on the tooth to relieve pain. You can buy oil of cloves at drugstores. Some pharmacies carry an over-the-counter "toothache kit." This contains a paste that you can put on the exposed tooth to make it less sensitive.  · Put a cold pack on your jaw over the sore area to help reduce pain.  · You may use over-the-counter medicine to ease pain, unless another medicine was prescribed. If you have chronic liver or kidney disease, talk with your healthcare provider before using acetaminophen or ibuprofen. Also talk with your provider if youve had a stomach ulcer or GI bleeding.  · An antibiotic will be prescribed. Take it until finished, even if you are feeling better after a few days.  Follow-up care  Follow up with your dentist or an oral surgeon, or as advised. Once an infection occurs in a tooth, it will continue to be a problem " until the infection is drained. This is done through surgery or a root canal. Or you may need to have your tooth pulled.  Call 911  Call 911 if any of these occur:  · Unusual drowsiness  · Headache or stiff neck  · Weakness or fainting  · Difficulty swallowing, breathing, or opening your mouth  · Swollen eyelids  When to seek medical advice  Call your healthcare provider right away if any of these occur:  · Your face becomes more swollen or red  · Pain gets worse or spreads to your neck  · Fever of 100.4º F (38.0º C) or higher, or as directed by your healthcare provider  · Pus drains from the tooth  Date Last Reviewed: 10/1/2016  © 1011-4334 [employer]. [address] [place] [address], [place] [address]. All rights reserved. This information is not intended as a substitute for professional medical care. Always follow your healthcare professional's instructions.      Follow UP with your dentist as discussed    Please follow up with your Primary care provider within 2-5 days if your signs and symptoms have not resolved or worsen.     If your condition worsens or fails to improve we recommend that you receive another evaluation at the emergency room immediately or contact your primary medical clinic to discuss your concerns.   You must understand that you have received an Urgent Care treatment only and that you may be released before all of your medical problems are known or treated. You, the patient, will arrange for follow up care as instructed.

## 2017-12-12 ENCOUNTER — OFFICE VISIT (OUTPATIENT)
Dept: URGENT CARE | Facility: CLINIC | Age: 57
End: 2017-12-12
Payer: COMMERCIAL

## 2017-12-12 VITALS
TEMPERATURE: 99 F | BODY MASS INDEX: 28.25 KG/M2 | WEIGHT: 180 LBS | DIASTOLIC BLOOD PRESSURE: 89 MMHG | RESPIRATION RATE: 18 BRPM | HEART RATE: 89 BPM | HEIGHT: 67 IN | SYSTOLIC BLOOD PRESSURE: 150 MMHG | OXYGEN SATURATION: 98 %

## 2017-12-12 DIAGNOSIS — J02.9 SORE THROAT: ICD-10-CM

## 2017-12-12 DIAGNOSIS — J32.9 SINUSITIS, UNSPECIFIED CHRONICITY, UNSPECIFIED LOCATION: Primary | ICD-10-CM

## 2017-12-12 DIAGNOSIS — R52 BODY ACHES: ICD-10-CM

## 2017-12-12 DIAGNOSIS — J02.0 STREP PHARYNGITIS: ICD-10-CM

## 2017-12-12 LAB
CTP QC/QA: YES
CTP QC/QA: YES
FLUAV AG NPH QL: NEGATIVE
FLUBV AG NPH QL: NEGATIVE
S PYO RRNA THROAT QL PROBE: POSITIVE

## 2017-12-12 PROCEDURE — 99214 OFFICE O/P EST MOD 30 MIN: CPT | Mod: S$GLB,,, | Performed by: PHYSICIAN ASSISTANT

## 2017-12-12 PROCEDURE — 87880 STREP A ASSAY W/OPTIC: CPT | Mod: QW,S$GLB,, | Performed by: PHYSICIAN ASSISTANT

## 2017-12-12 PROCEDURE — 87804 INFLUENZA ASSAY W/OPTIC: CPT | Mod: 59,QW,S$GLB, | Performed by: PHYSICIAN ASSISTANT

## 2017-12-12 RX ORDER — CEFDINIR 300 MG/1
300 CAPSULE ORAL EVERY 12 HOURS
Qty: 14 CAPSULE | Refills: 0 | Status: SHIPPED | OUTPATIENT
Start: 2017-12-12 | End: 2017-12-19

## 2017-12-12 NOTE — LETTER
December 12, 2017      [facility]  [address] [address], [address]  [place] [address]  Phone: [phone]  Fax: [phone]       Patient: Kendra Griffin   YOB: 1960  Date of Visit: 12/12/2017    To Whom It May Concern:    Tyler Griffin  was at [facility] on 12/12/2017. She may return to work/school on 12/13/2017 with no restrictions. If you have any questions or concerns, or if I can be of further assistance, please do not hesitate to contact me.    Sincerely,    Aarti Yañez PA-C

## 2017-12-12 NOTE — PROGRESS NOTES
"Subjective:       Patient ID: Kendra Griffin is a 57 y.o. female.    Vitals:  height is 5' 7" (1.702 m) and weight is 81.6 kg (180 lb). Her temperature is 98.5 °F (36.9 °C). Her blood pressure is 150/89 (abnormal) and her pulse is 89. Her respiration is 18 and oxygen saturation is 98%.     Chief Complaint: Sore Throat and Generalized Body Aches    Sore Throat    This is a new problem. The current episode started in the past 7 days. The problem has been gradually worsening. There has been no fever. The pain is at a severity of 7/10. The pain is moderate. Pertinent negatives include no abdominal pain, congestion, coughing, ear pain, headaches, hoarse voice or shortness of breath. She has tried acetaminophen for the symptoms. The treatment provided no relief.     Review of Systems   Constitution: Positive for chills, weakness and night sweats. Negative for fever and malaise/fatigue.   HENT: Positive for sore throat. Negative for congestion, ear pain and hoarse voice.    Eyes: Negative for discharge and redness.   Cardiovascular: Negative for chest pain, dyspnea on exertion and leg swelling.   Respiratory: Negative for cough, shortness of breath, sputum production and wheezing.    Musculoskeletal: Negative for myalgias.   Gastrointestinal: Negative for abdominal pain and nausea.   Neurological: Negative for headaches.       Objective:      Physical Exam   Constitutional: She is oriented to person, place, and time. She appears well-developed and well-nourished. She is cooperative.  Non-toxic appearance. She does not appear ill. No distress.   HENT:   Head: Normocephalic and atraumatic.   Right Ear: Hearing, tympanic membrane, external ear and ear canal normal.   Left Ear: Hearing, tympanic membrane, external ear and ear canal normal.   Nose: Mucosal edema and rhinorrhea present. No nasal deformity. No epistaxis. Right sinus exhibits maxillary sinus tenderness. Right sinus exhibits no frontal sinus tenderness. Left sinus " exhibits maxillary sinus tenderness. Left sinus exhibits no frontal sinus tenderness.   Mouth/Throat: Uvula is midline and mucous membranes are normal. No trismus in the jaw. Normal dentition. No uvula swelling. Posterior oropharyngeal erythema present. Tonsils are 2+ on the right. Tonsils are 2+ on the left. No tonsillar exudate.   Eyes: Conjunctivae and lids are normal. No scleral icterus.   Sclera clear bilat   Neck: Trachea normal, full passive range of motion without pain and phonation normal. Neck supple.   Cardiovascular: Normal rate, regular rhythm, normal heart sounds, intact distal pulses and normal pulses.    Pulmonary/Chest: Effort normal and breath sounds normal. No respiratory distress.   Abdominal: Soft. Normal appearance and bowel sounds are normal. She exhibits no distension. There is no tenderness.   Musculoskeletal: Normal range of motion. She exhibits no edema or deformity.   Lymphadenopathy:        Head (right side): Tonsillar adenopathy present. No submental, no submandibular, no preauricular, no posterior auricular and no occipital adenopathy present.        Head (left side): Tonsillar adenopathy present. No submental, no submandibular, no preauricular, no posterior auricular and no occipital adenopathy present.     She has cervical adenopathy.        Right cervical: Superficial cervical adenopathy present.        Left cervical: Superficial cervical adenopathy present.   Neurological: She is alert and oriented to person, place, and time. She exhibits normal muscle tone. Coordination normal.   Skin: Skin is warm, dry and intact. She is not diaphoretic. No pallor.   Psychiatric: She has a normal mood and affect. Her speech is normal and behavior is normal. Judgment and thought content normal. Cognition and memory are normal.   Nursing note and vitals reviewed.      Assessment:       1. Sinusitis, unspecified chronicity, unspecified location    2. Strep pharyngitis    3. Sore throat    4. Body  aches        Plan:         Sinusitis, unspecified chronicity, unspecified location  -     cefdinir (OMNICEF) 300 MG capsule; Take 1 capsule (300 mg total) by mouth every 12 (twelve) hours.  Dispense: 14 capsule; Refill: 0    Strep pharyngitis  -     cefdinir (OMNICEF) 300 MG capsule; Take 1 capsule (300 mg total) by mouth every 12 (twelve) hours.  Dispense: 14 capsule; Refill: 0    Sore throat  -     POCT rapid strep A  -     cefdinir (OMNICEF) 300 MG capsule; Take 1 capsule (300 mg total) by mouth every 12 (twelve) hours.  Dispense: 14 capsule; Refill: 0    Body aches  -     POCT Influenza A/B        Pharyngitis: Strep (Confirmed)    You have had a positive test for strep throat. Strep throat is a contagious illness. It is spread by coughing, kissing or by touching others after touching your mouth or nose. Symptoms include throat pain that is worse with swallowing, aching all over, headache, and fever. It is treated with antibiotic medicine. This should help you start to feel better in 1 to 2 days.  Home care  · Rest at home. Drink plenty of fluids to you won't get dehydrated.  · No work or school for the first 2 days of taking the antibiotics. After this time, you will not be contagious. You can then return to school or work if you are feeling better.   · Take antibiotic medicine for the full 10 days, even if you feel better. This is very important to ensure the infection is treated. It is also important to prevent medicine-resistant germs from developing. If you were given an antibiotic shot, you don't need any more antibiotics.  · You may use acetaminophen or ibuprofen to control pain or fever, unless another medicine was prescribed for this. Talk with your doctor before taking these medicines if you have chronic liver or kidney disease. Also talk with your doctor if you have had a stomach ulcer or GI bleeding.  · Throat lozenges or sprays help reduce pain. Gargling with warm saltwater will also reduce throat  pain. Dissolve 1/2 teaspoon of salt in 1 glass of warm water. This may be useful just before meals.   · Soft foods are OK. Avoid salty or spicy foods.  Follow-up care  Follow up with your healthcare provider or our staff if you don't get better over the next week.  When to seek medical advice  Call your healthcare provider right away if any of these occur:  · Fever of 100.4ºF (38ºC) or higher, or as directed by your healthcare provider  · New or worsening ear pain, sinus pain, or headache  · Painful lumps in the back of neck  · Stiff neck  · Lymph nodes getting larger or becoming soft in the middle  · You can't swallow liquids or you can't open your mouth wide because of throat pain  · Signs of dehydration. These include very dark urine or no urine, sunken eyes, and dizziness.  · Trouble breathing or noisy breathing  · Muffled voice  · Rash  Date Last Reviewed: 4/13/2015  © 0604-6996 [employer]. [address] [place] [address]. All rights reserved. This information is not intended as a substitute for professional medical care. Always follow your healthcare professional's instructions.        Sinusitis (Antibiotic Treatment)    The sinuses are air-filled spaces within the bones of the face. They connect to the inside of the nose. Sinusitis is an inflammation of the tissue lining the sinus cavity. Sinus inflammation can occur during a cold. It can also be due to allergies to pollens and other particles in the air. Sinusitis can cause symptoms of sinus congestion and fullness. A sinus infection causes fever, headache and facial pain. There is often green or yellow drainage from the nose or into the back of the throat (post-nasal drip). You have been given antibiotics to treat this condition.  Home care:  · Take the full course of antibiotics as instructed. Do not stop taking them, even if you feel better.  · Drink plenty of water, hot tea, and other liquids. This may help thin mucus. It also  may promote sinus drainage.  · Heat may help soothe painful areas of the face. Use a towel soaked in hot water. Or,  the shower and direct the hot spray onto your face. Using a vaporizer along with a menthol rub at night may also help.   · An expectorant containing guaifenesin may help thin the mucus and promote drainage from the sinuses.  · Over-the-counter decongestants may be used unless a similar medicine was prescribed. Nasal sprays work the fastest. Use one that contains phenylephrine or oxymetazoline. First blow the nose gently. Then use the spray. Do not use these medicines more often than directed on the label or symptoms may get worse. You may also use tablets containing pseudoephedrine. Avoid products that combine ingredients, because side effects may be increased. Read labels. You can also ask the pharmacist for help. (NOTE: Persons with high blood pressure should not use decongestants. They can raise blood pressure.)  · Over-the-counter antihistamines may help if allergies contributed to your sinusitis.    · Do not use nasal rinses or irrigation during an acute sinus infection, unless told to by your health care provider. Rinsing may spread the infection to other sinuses.  · Use acetaminophen or ibuprofen to control pain, unless another pain medicine was prescribed. (If you have chronic liver or kidney disease or ever had a stomach ulcer, talk with your doctor before using these medicines. Aspirin should never be used in anyone under 18 years of age who is ill with a fever. It may cause severe liver damage.)  · Don't smoke. This can worsen symptoms.  Follow-up care  Follow up with your healthcare provider or our staff if you are not improving within the next week.  When to seek medical advice  Call your healthcare provider if any of these occur:  · Facial pain or headache becoming more severe  · Stiff neck  · Unusual drowsiness or confusion  · Swelling of the forehead or eyelids  · Vision  problems, including blurred or double vision  · Fever of 100.4ºF (38ºC) or higher, or as directed by your healthcare provider  · Seizure  · Breathing problems  · Symptoms not resolving within 10 days  Date Last Reviewed: 4/13/2015  © 8422-7730 [employer]. [address] [place] [address]. All rights reserved. This information is not intended as a substitute for professional medical care. Always follow your healthcare professional's instructions.      Please follow up with your Primary care provider within 2-5 days if your signs and symptoms have not resolved or worsen.     If your condition worsens or fails to improve we recommend that you receive another evaluation at the emergency room immediately or contact your primary medical clinic to discuss your concerns.   You must understand that you have received an Urgent Care treatment only and that you may be released before all of your medical problems are known or treated. You, the patient, will arrange for follow up care as instructed.

## 2017-12-12 NOTE — PATIENT INSTRUCTIONS
Pharyngitis: Strep (Confirmed)    You have had a positive test for strep throat. Strep throat is a contagious illness. It is spread by coughing, kissing or by touching others after touching your mouth or nose. Symptoms include throat pain that is worse with swallowing, aching all over, headache, and fever. It is treated with antibiotic medicine. This should help you start to feel better in 1 to 2 days.  Home care  · Rest at home. Drink plenty of fluids to you won't get dehydrated.  · No work or school for the first 2 days of taking the antibiotics. After this time, you will not be contagious. You can then return to school or work if you are feeling better.   · Take antibiotic medicine for the full 10 days, even if you feel better. This is very important to ensure the infection is treated. It is also important to prevent medicine-resistant germs from developing. If you were given an antibiotic shot, you don't need any more antibiotics.  · You may use acetaminophen or ibuprofen to control pain or fever, unless another medicine was prescribed for this. Talk with your doctor before taking these medicines if you have chronic liver or kidney disease. Also talk with your doctor if you have had a stomach ulcer or GI bleeding.  · Throat lozenges or sprays help reduce pain. Gargling with warm saltwater will also reduce throat pain. Dissolve 1/2 teaspoon of salt in 1 glass of warm water. This may be useful just before meals.   · Soft foods are OK. Avoid salty or spicy foods.  Follow-up care  Follow up with your healthcare provider or our staff if you don't get better over the next week.  When to seek medical advice  Call your healthcare provider right away if any of these occur:  · Fever of 100.4ºF (38ºC) or higher, or as directed by your healthcare provider  · New or worsening ear pain, sinus pain, or headache  · Painful lumps in the back of neck  · Stiff neck  · Lymph nodes getting larger or becoming soft in the  middle  · You can't swallow liquids or you can't open your mouth wide because of throat pain  · Signs of dehydration. These include very dark urine or no urine, sunken eyes, and dizziness.  · Trouble breathing or noisy breathing  · Muffled voice  · Rash  Date Last Reviewed: 4/13/2015  © 0326-0865 [employer]. [address] [place] [address], [place] [address]. All rights reserved. This information is not intended as a substitute for professional medical care. Always follow your healthcare professional's instructions.        Sinusitis (Antibiotic Treatment)    The sinuses are air-filled spaces within the bones of the face. They connect to the inside of the nose. Sinusitis is an inflammation of the tissue lining the sinus cavity. Sinus inflammation can occur during a cold. It can also be due to allergies to pollens and other particles in the air. Sinusitis can cause symptoms of sinus congestion and fullness. A sinus infection causes fever, headache and facial pain. There is often green or yellow drainage from the nose or into the back of the throat (post-nasal drip). You have been given antibiotics to treat this condition.  Home care:  · Take the full course of antibiotics as instructed. Do not stop taking them, even if you feel better.  · Drink plenty of water, hot tea, and other liquids. This may help thin mucus. It also may promote sinus drainage.  · Heat may help soothe painful areas of the face. Use a towel soaked in hot water. Or,  the shower and direct the hot spray onto your face. Using a vaporizer along with a menthol rub at night may also help.   · An expectorant containing guaifenesin may help thin the mucus and promote drainage from the sinuses.  · Over-the-counter decongestants may be used unless a similar medicine was prescribed. Nasal sprays work the fastest. Use one that contains phenylephrine or oxymetazoline. First blow the nose gently. Then use the spray. Do not use these medicines more  often than directed on the label or symptoms may get worse. You may also use tablets containing pseudoephedrine. Avoid products that combine ingredients, because side effects may be increased. Read labels. You can also ask the pharmacist for help. (NOTE: Persons with high blood pressure should not use decongestants. They can raise blood pressure.)  · Over-the-counter antihistamines may help if allergies contributed to your sinusitis.    · Do not use nasal rinses or irrigation during an acute sinus infection, unless told to by your health care provider. Rinsing may spread the infection to other sinuses.  · Use acetaminophen or ibuprofen to control pain, unless another pain medicine was prescribed. (If you have chronic liver or kidney disease or ever had a stomach ulcer, talk with your doctor before using these medicines. Aspirin should never be used in anyone under 18 years of age who is ill with a fever. It may cause severe liver damage.)  · Don't smoke. This can worsen symptoms.  Follow-up care  Follow up with your healthcare provider or our staff if you are not improving within the next week.  When to seek medical advice  Call your healthcare provider if any of these occur:  · Facial pain or headache becoming more severe  · Stiff neck  · Unusual drowsiness or confusion  · Swelling of the forehead or eyelids  · Vision problems, including blurred or double vision  · Fever of 100.4ºF (38ºC) or higher, or as directed by your healthcare provider  · Seizure  · Breathing problems  · Symptoms not resolving within 10 days  Date Last Reviewed: 4/13/2015  © 0313-6693 [employer] [address] [place] [address], [place] [address]. All rights reserved. This information is not intended as a substitute for professional medical care. Always follow your healthcare professional's instructions.      Please follow up with your Primary care provider within 2-5 days if your signs and symptoms have not resolved or worsen.     If  your condition worsens or fails to improve we recommend that you receive another evaluation at the emergency room immediately or contact your primary medical clinic to discuss your concerns.   You must understand that you have received an Urgent Care treatment only and that you may be released before all of your medical problems are known or treated. You, the patient, will arrange for follow up care as instructed.

## 2017-12-15 ENCOUNTER — TELEPHONE (OUTPATIENT)
Dept: URGENT CARE | Facility: CLINIC | Age: 57
End: 2017-12-15

## 2018-02-03 ENCOUNTER — OFFICE VISIT (OUTPATIENT)
Dept: URGENT CARE | Facility: CLINIC | Age: 58
End: 2018-02-03
Payer: COMMERCIAL

## 2018-02-03 VITALS
TEMPERATURE: 97 F | HEART RATE: 85 BPM | HEIGHT: 67 IN | SYSTOLIC BLOOD PRESSURE: 140 MMHG | WEIGHT: 180 LBS | OXYGEN SATURATION: 98 % | RESPIRATION RATE: 18 BRPM | DIASTOLIC BLOOD PRESSURE: 101 MMHG | BODY MASS INDEX: 28.25 KG/M2

## 2018-02-03 DIAGNOSIS — J01.90 ACUTE SINUSITIS, RECURRENCE NOT SPECIFIED, UNSPECIFIED LOCATION: Primary | ICD-10-CM

## 2018-02-03 DIAGNOSIS — N76.1 SUBACUTE VAGINITIS: ICD-10-CM

## 2018-02-03 PROCEDURE — 3008F BODY MASS INDEX DOCD: CPT | Mod: S$GLB,,, | Performed by: EMERGENCY MEDICINE

## 2018-02-03 PROCEDURE — 99214 OFFICE O/P EST MOD 30 MIN: CPT | Mod: 25,S$GLB,, | Performed by: EMERGENCY MEDICINE

## 2018-02-03 PROCEDURE — 96372 THER/PROPH/DIAG INJ SC/IM: CPT | Mod: S$GLB,,, | Performed by: EMERGENCY MEDICINE

## 2018-02-03 RX ORDER — DOXYCYCLINE 100 MG/1
100 CAPSULE ORAL 2 TIMES DAILY
Qty: 20 CAPSULE | Refills: 0 | Status: SHIPPED | OUTPATIENT
Start: 2018-02-03 | End: 2018-02-13

## 2018-02-03 RX ORDER — BETAMETHASONE SODIUM PHOSPHATE AND BETAMETHASONE ACETATE 3; 3 MG/ML; MG/ML
6 INJECTION, SUSPENSION INTRA-ARTICULAR; INTRALESIONAL; INTRAMUSCULAR; SOFT TISSUE
Status: COMPLETED | OUTPATIENT
Start: 2018-02-03 | End: 2018-02-03

## 2018-02-03 RX ORDER — FLUCONAZOLE 150 MG/1
150 TABLET ORAL ONCE
Qty: 1 TABLET | Refills: 0 | Status: SHIPPED | OUTPATIENT
Start: 2018-02-03 | End: 2018-02-03

## 2018-02-03 RX ADMIN — BETAMETHASONE SODIUM PHOSPHATE AND BETAMETHASONE ACETATE 6 MG: 3; 3 INJECTION, SUSPENSION INTRA-ARTICULAR; INTRALESIONAL; INTRAMUSCULAR; SOFT TISSUE at 09:02

## 2018-02-03 NOTE — PROGRESS NOTES
"Subjective:       Patient ID: Kendra Griffin is a 57 y.o. female.    Vitals:  height is 5' 7" (1.702 m) and weight is 81.6 kg (180 lb). Her oral temperature is 97.1 °F (36.2 °C). Her blood pressure is 140/101 (abnormal) and her pulse is 85. Her respiration is 18 and oxygen saturation is 98%.     Chief Complaint: Sinus Problem    Sinus congestion/pressure/drainage past week, states z-tanya does not work well nor does augmentin, OK with doxy/steroid Im, gets vaginitis prn antibiotic use.      Sinus Problem   This is a new problem. The current episode started 1 to 4 weeks ago. The problem has been gradually worsening since onset. There has been no fever. Her pain is at a severity of 8/10. The pain is severe. Associated symptoms include congestion, coughing, headaches, a hoarse voice and sinus pressure. Pertinent negatives include no chills, ear pain, shortness of breath or sore throat. Past treatments include oral decongestants and acetaminophen. The treatment provided no relief.     Review of Systems   Constitution: Negative for chills, fever and malaise/fatigue.   HENT: Positive for congestion, hoarse voice and sinus pressure. Negative for ear pain and sore throat.    Eyes: Negative for discharge and redness.   Cardiovascular: Negative for chest pain, dyspnea on exertion and leg swelling.   Respiratory: Positive for cough. Negative for shortness of breath, sputum production and wheezing.    Musculoskeletal: Negative for myalgias.   Gastrointestinal: Negative for abdominal pain and nausea.   Neurological: Positive for headaches.       Objective:      Physical Exam   Constitutional: She is oriented to person, place, and time. She appears well-developed and well-nourished.   HENT:   Head: Normocephalic and atraumatic.   Right Ear: Tympanic membrane, external ear and ear canal normal.   Left Ear: Tympanic membrane, external ear and ear canal normal.   Nose: Mucosal edema and rhinorrhea present. Right sinus exhibits " maxillary sinus tenderness. Right sinus exhibits no frontal sinus tenderness. Left sinus exhibits maxillary sinus tenderness. Left sinus exhibits no frontal sinus tenderness.   Mouth/Throat: Uvula is midline, oropharynx is clear and moist and mucous membranes are normal.   Eyes: EOM are normal. Pupils are equal, round, and reactive to light.   Neck: Normal range of motion. Neck supple.   Cardiovascular: Normal rate, regular rhythm and normal heart sounds.    Pulmonary/Chest: Breath sounds normal.   Musculoskeletal: Normal range of motion.   Lymphadenopathy:     She has no cervical adenopathy.   Neurological: She is alert and oriented to person, place, and time.   Skin: Skin is warm and dry.   Psychiatric: She has a normal mood and affect. Her behavior is normal.       Assessment:       1. Acute sinusitis, recurrence not specified, unspecified location    2. Subacute vaginitis        Plan:         Acute sinusitis, recurrence not specified, unspecified location  -     betamethasone acetate-betamethasone sodium phosphate injection 6 mg; Inject 1 mL (6 mg total) into the muscle one time.  -     doxycycline (VIBRAMYCIN) 100 MG Cap; Take 1 capsule (100 mg total) by mouth 2 (two) times daily. Generic or equivalent OK  Dispense: 20 capsule; Refill: 0    Subacute vaginitis  -     fluconazole (DIFLUCAN) 150 MG Tab; Take 1 tablet (150 mg total) by mouth once. Take one time as needed for vaginal yeast infection  Dispense: 1 tablet; Refill: 0      Fco Kahn MD  Go to the Emergency Department for any problems  Call your PCP for follow up next available.

## 2018-02-03 NOTE — PATIENT INSTRUCTIONS
Fco Kahn MD  Go to the Emergency Department for any problems  Call your PCP for follow up next available.    Sinusitis (Antibiotic Treatment)    The sinuses are air-filled spaces within the bones of the face. They connect to the inside of the nose. Sinusitis is an inflammation of the tissue lining the sinus cavity. Sinus inflammation can occur during a cold. It can also be due to allergies to pollens and other particles in the air. Sinusitis can cause symptoms of sinus congestion and fullness. A sinus infection causes fever, headache and facial pain. There is often green or yellow drainage from the nose or into the back of the throat (post-nasal drip). You have been given antibiotics to treat this condition.  Home care:  · Take the full course of antibiotics as instructed. Do not stop taking them, even if you feel better.  · Drink plenty of water, hot tea, and other liquids. This may help thin mucus. It also may promote sinus drainage.  · Heat may help soothe painful areas of the face. Use a towel soaked in hot water. Or,  the shower and direct the hot spray onto your face. Using a vaporizer along with a menthol rub at night may also help.   · An expectorant containing guaifenesin may help thin the mucus and promote drainage from the sinuses.  · Over-the-counter decongestants may be used unless a similar medicine was prescribed. Nasal sprays work the fastest. Use one that contains phenylephrine or oxymetazoline. First blow the nose gently. Then use the spray. Do not use these medicines more often than directed on the label or symptoms may get worse. You may also use tablets containing pseudoephedrine. Avoid products that combine ingredients, because side effects may be increased. Read labels. You can also ask the pharmacist for help. (NOTE: Persons with high blood pressure should not use decongestants. They can raise blood pressure.)  · Over-the-counter antihistamines may help if allergies contributed  to your sinusitis.    · Do not use nasal rinses or irrigation during an acute sinus infection, unless told to by your health care provider. Rinsing may spread the infection to other sinuses.  · Use acetaminophen or ibuprofen to control pain, unless another pain medicine was prescribed. (If you have chronic liver or kidney disease or ever had a stomach ulcer, talk with your doctor before using these medicines. Aspirin should never be used in anyone under 18 years of age who is ill with a fever. It may cause severe liver damage.)  · Don't smoke. This can worsen symptoms.  Follow-up care  Follow up with your healthcare provider or our staff if you are not improving within the next week.  When to seek medical advice  Call your healthcare provider if any of these occur:  · Facial pain or headache becoming more severe  · Stiff neck  · Unusual drowsiness or confusion  · Swelling of the forehead or eyelids  · Vision problems, including blurred or double vision  · Fever of 100.4ºF (38ºC) or higher, or as directed by your healthcare provider  · Seizure  · Breathing problems  · Symptoms not resolving within 10 days  Date Last Reviewed: 4/13/2015  © 9209-1288 [employer]. [address] [place] [address]. All rights reserved. This information is not intended as a substitute for professional medical care. Always follow your healthcare professional's instructions.        Preventing Vaginitis     Use mild, unscented soap when you bathe or shower to avoid irritating your vagina.    Vaginitis is irritation or infection of the vagina or vulva (the outside opening of the vagina). Vaginitis can be caused by bacteria, viruses, parasites, or yeast. Chemicals (such as in perfumes or soaps or in spermicides) can sometimes be a cause. Vaginitis can be caused by hormone changes in pregnancy or with menopause. You can help prevent vaginitis. Follow the tips below. And see your healthcare provider if you have any  symptoms.  Hygiene  · Avoid chemicals. Do not use vaginal sprays. Do not use scented toilet paper or tampons that are scented. Sprays and scents have chemicals that can irritate your vagina.  · Do not douche unless you are told to by your healthcare provider. Douching is rarely needed. And it upsets the normal balance in the vagina.  · Wash yourself well. Wash the outer vaginal area (vulva) every day with mild, unscented soap. Keep it as dry as possible.  · Wipe correctly. Make sure to wipe from front to back after a bowel movement. This helps keep from spreading bacteria from your anus to your vagina.  · Change your tampon often. During your period, make sure to change your tampon as often as directed on the package. This allows the normal flow of vaginal discharge and blood.  Lifestyle  · Limit your number of sexual partners. The more partners you have, the greater your risk of infection. Using condoms helps reduce your risk.  · Get enough sleep. Sleep helps keep your bodys immune system healthy. This helps you fight infection.  · Lose weight, if needed. Excess weight can reduce air circulation around your vagina. This can increase your risk of infection.  · Exercise regularly. Regular activity helps keep your body healthy.  · Take antibiotics only as directed. Antibiotics can change the normal chemical balance in the vagina.    Clothing  · Dont sit in wet clothes. Yeast thrives when its warm and damp.  · Dont wear tight pants. And dont wear tights, leggings, or hose without a cotton crotch. These types of clothing trap warmth and moisture.  · Wear cotton underwear. Cotton lets air circulate around the vagina.  Symptoms of vaginitis  · Irritation, swelling, or itching of the genital area  · Vaginal discharge  · Bad vaginal odor  · Pain or burning during urination   Date Last Reviewed: 12/1/2016  © 6423-6597 [employer]. [address] [place] [address], [place] [address]. All rights reserved. This  information is not intended as a substitute for professional medical care. Always follow your healthcare professional's instructions.

## 2018-02-06 ENCOUNTER — TELEPHONE (OUTPATIENT)
Dept: URGENT CARE | Facility: CLINIC | Age: 58
End: 2018-02-06

## 2018-03-23 ENCOUNTER — HOSPITAL ENCOUNTER (OUTPATIENT)
Dept: RADIOLOGY | Facility: HOSPITAL | Age: 58
Discharge: HOME | End: 2018-03-23
Attending: INTERNAL MEDICINE
Payer: COMMERCIAL

## 2018-03-23 ENCOUNTER — OFFICE VISIT (OUTPATIENT)
Dept: FAMILY MEDICINE | Facility: CLINIC | Age: 58
End: 2018-03-23
Payer: COMMERCIAL

## 2018-03-23 ENCOUNTER — PATIENT MESSAGE (OUTPATIENT)
Dept: FAMILY MEDICINE | Facility: CLINIC | Age: 58
End: 2018-03-23

## 2018-03-23 VITALS
SYSTOLIC BLOOD PRESSURE: 130 MMHG | BODY MASS INDEX: 29.66 KG/M2 | HEART RATE: 90 BPM | DIASTOLIC BLOOD PRESSURE: 88 MMHG | WEIGHT: 189 LBS | TEMPERATURE: 98 F | HEIGHT: 67 IN | OXYGEN SATURATION: 97 %

## 2018-03-23 DIAGNOSIS — I10 ESSENTIAL (PRIMARY) HYPERTENSION: Primary | ICD-10-CM

## 2018-03-23 DIAGNOSIS — K21.9 GERD WITHOUT ESOPHAGITIS: ICD-10-CM

## 2018-03-23 DIAGNOSIS — F51.04 PSYCHOPHYSIOLOGICAL INSOMNIA: Chronic | ICD-10-CM

## 2018-03-23 DIAGNOSIS — Z12.31 ENCOUNTER FOR SCREENING MAMMOGRAM FOR BREAST CANCER: ICD-10-CM

## 2018-03-23 DIAGNOSIS — F41.0 PANIC ATTACKS: ICD-10-CM

## 2018-03-23 DIAGNOSIS — Z00.00 ROUTINE MEDICAL EXAM: ICD-10-CM

## 2018-03-23 PROCEDURE — 99999 PR PBB SHADOW E&M-EST. PATIENT-LVL IV: CPT | Mod: PBBFAC,,, | Performed by: INTERNAL MEDICINE

## 2018-03-23 PROCEDURE — 99214 OFFICE O/P EST MOD 30 MIN: CPT | Mod: S$GLB,,, | Performed by: INTERNAL MEDICINE

## 2018-03-23 PROCEDURE — 77067 SCR MAMMO BI INCL CAD: CPT | Mod: TC,PO

## 2018-03-23 PROCEDURE — 3075F SYST BP GE 130 - 139MM HG: CPT | Mod: CPTII,S$GLB,, | Performed by: INTERNAL MEDICINE

## 2018-03-23 PROCEDURE — 3079F DIAST BP 80-89 MM HG: CPT | Mod: CPTII,S$GLB,, | Performed by: INTERNAL MEDICINE

## 2018-03-23 PROCEDURE — 77067 SCR MAMMO BI INCL CAD: CPT | Mod: 26,,, | Performed by: RADIOLOGY

## 2018-03-23 PROCEDURE — 77063 BREAST TOMOSYNTHESIS BI: CPT | Mod: 26,,, | Performed by: RADIOLOGY

## 2018-03-23 RX ORDER — FAMOTIDINE 40 MG/1
40 TABLET, FILM COATED ORAL DAILY
Qty: 90 TABLET | Refills: 1 | Status: SHIPPED | OUTPATIENT
Start: 2018-03-23 | End: 2019-05-08 | Stop reason: SDUPTHER

## 2018-03-23 RX ORDER — HYDROXYZINE PAMOATE 25 MG/1
CAPSULE ORAL
Qty: 90 CAPSULE | Refills: 0 | Status: SHIPPED | OUTPATIENT
Start: 2018-03-23 | End: 2018-11-23 | Stop reason: SDUPTHER

## 2018-03-23 NOTE — PATIENT INSTRUCTIONS
Please call [phone] to schedule your appointment with the counselor at the [facility].  This appointment must be scheduled by the patient themselves; we are unable to schedule it for you.         [phone]
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
## 2018-03-23 NOTE — ASSESSMENT & PLAN NOTE
Previously had Clonazepam.  Will start trial of hydroxyzine.  Wakes up with anxiety symptoms.  No nightmares.  She reports that these symptoms since her  service in [employer].

## 2018-03-27 ENCOUNTER — PATIENT MESSAGE (OUTPATIENT)
Dept: FAMILY MEDICINE | Facility: CLINIC | Age: 58
End: 2018-03-27

## 2018-03-29 ENCOUNTER — PATIENT MESSAGE (OUTPATIENT)
Dept: FAMILY MEDICINE | Facility: CLINIC | Age: 58
End: 2018-03-29

## 2018-04-02 ENCOUNTER — PATIENT MESSAGE (OUTPATIENT)
Dept: PSYCHIATRY | Facility: CLINIC | Age: 58
End: 2018-04-02

## 2018-05-05 ENCOUNTER — LAB VISIT (OUTPATIENT)
Dept: LAB | Facility: HOSPITAL | Age: 58
End: 2018-05-05
Attending: INTERNAL MEDICINE
Payer: COMMERCIAL

## 2018-05-05 DIAGNOSIS — Z00.00 ROUTINE MEDICAL EXAM: ICD-10-CM

## 2018-05-05 LAB
ALBUMIN SERPL BCP-MCNC: 3.9 G/DL
ALP SERPL-CCNC: 103 U/L
ALT SERPL W/O P-5'-P-CCNC: 20 U/L
ANION GAP SERPL CALC-SCNC: 10 MMOL/L
AST SERPL-CCNC: 21 U/L
BASOPHILS # BLD AUTO: 0.03 K/UL
BASOPHILS NFR BLD: 0.7 %
BILIRUB SERPL-MCNC: 0.6 MG/DL
BUN SERPL-MCNC: 7 MG/DL
CALCIUM SERPL-MCNC: 10.1 MG/DL
CHLORIDE SERPL-SCNC: 106 MMOL/L
CHOLEST SERPL-MCNC: 259 MG/DL
CHOLEST/HDLC SERPL: 3.2 {RATIO}
CO2 SERPL-SCNC: 26 MMOL/L
CREAT SERPL-MCNC: 0.8 MG/DL
DIFFERENTIAL METHOD: ABNORMAL
EOSINOPHIL # BLD AUTO: 0.2 K/UL
EOSINOPHIL NFR BLD: 3.8 %
ERYTHROCYTE [DISTWIDTH] IN BLOOD BY AUTOMATED COUNT: 16.7 %
EST. GFR  (AFRICAN AMERICAN): >60 ML/MIN/1.73 M^2
EST. GFR  (NON AFRICAN AMERICAN): >60 ML/MIN/1.73 M^2
ESTIMATED AVG GLUCOSE: 114 MG/DL
GLUCOSE SERPL-MCNC: 87 MG/DL
HBA1C MFR BLD HPLC: 5.6 %
HCT VFR BLD AUTO: 41 %
HDLC SERPL-MCNC: 80 MG/DL
HDLC SERPL: 30.9 %
HGB BLD-MCNC: 11.7 G/DL
IMM GRANULOCYTES # BLD AUTO: 0.01 K/UL
IMM GRANULOCYTES NFR BLD AUTO: 0.2 %
LDLC SERPL CALC-MCNC: 161.4 MG/DL
LYMPHOCYTES # BLD AUTO: 1.4 K/UL
LYMPHOCYTES NFR BLD: 32.2 %
MCH RBC QN AUTO: 22.4 PG
MCHC RBC AUTO-ENTMCNC: 28.5 G/DL
MCV RBC AUTO: 78 FL
MONOCYTES # BLD AUTO: 0.3 K/UL
MONOCYTES NFR BLD: 7.3 %
NEUTROPHILS # BLD AUTO: 2.4 K/UL
NEUTROPHILS NFR BLD: 55.8 %
NONHDLC SERPL-MCNC: 179 MG/DL
NRBC BLD-RTO: 0 /100 WBC
PLATELET # BLD AUTO: 309 K/UL
PMV BLD AUTO: 10.3 FL
POTASSIUM SERPL-SCNC: 4.1 MMOL/L
PROT SERPL-MCNC: 7.5 G/DL
RBC # BLD AUTO: 5.23 M/UL
SODIUM SERPL-SCNC: 142 MMOL/L
TRIGL SERPL-MCNC: 88 MG/DL
TSH SERPL DL<=0.005 MIU/L-ACNC: 0.78 UIU/ML
WBC # BLD AUTO: 4.25 K/UL

## 2018-05-05 PROCEDURE — 80061 LIPID PANEL: CPT

## 2018-05-05 PROCEDURE — 83036 HEMOGLOBIN GLYCOSYLATED A1C: CPT

## 2018-05-05 PROCEDURE — 36415 COLL VENOUS BLD VENIPUNCTURE: CPT | Mod: PO

## 2018-05-05 PROCEDURE — 84443 ASSAY THYROID STIM HORMONE: CPT

## 2018-05-05 PROCEDURE — 80053 COMPREHEN METABOLIC PANEL: CPT

## 2018-05-05 PROCEDURE — 85025 COMPLETE CBC W/AUTO DIFF WBC: CPT

## 2018-05-07 PROBLEM — J01.90 ACUTE SINUSITIS: Status: RESOLVED | Noted: 2018-02-03 | Resolved: 2018-05-07

## 2018-05-08 ENCOUNTER — OFFICE VISIT (OUTPATIENT)
Dept: FAMILY MEDICINE | Facility: CLINIC | Age: 58
End: 2018-05-08
Payer: COMMERCIAL

## 2018-05-08 VITALS
SYSTOLIC BLOOD PRESSURE: 134 MMHG | DIASTOLIC BLOOD PRESSURE: 92 MMHG | HEIGHT: 67 IN | WEIGHT: 187.81 LBS | OXYGEN SATURATION: 99 % | TEMPERATURE: 98 F | BODY MASS INDEX: 29.48 KG/M2 | HEART RATE: 76 BPM

## 2018-05-08 DIAGNOSIS — I10 ESSENTIAL (PRIMARY) HYPERTENSION: ICD-10-CM

## 2018-05-08 DIAGNOSIS — Z00.00 ROUTINE MEDICAL EXAM: Primary | ICD-10-CM

## 2018-05-08 DIAGNOSIS — F51.04 PSYCHOPHYSIOLOGICAL INSOMNIA: Chronic | ICD-10-CM

## 2018-05-08 DIAGNOSIS — D50.9 MICROCYTIC ANEMIA: ICD-10-CM

## 2018-05-08 DIAGNOSIS — K21.9 GERD WITHOUT ESOPHAGITIS: Chronic | ICD-10-CM

## 2018-05-08 DIAGNOSIS — E78.49 OTHER HYPERLIPIDEMIA: Chronic | ICD-10-CM

## 2018-05-08 PROCEDURE — 3080F DIAST BP >= 90 MM HG: CPT | Mod: CPTII,S$GLB,, | Performed by: INTERNAL MEDICINE

## 2018-05-08 PROCEDURE — 3075F SYST BP GE 130 - 139MM HG: CPT | Mod: CPTII,S$GLB,, | Performed by: INTERNAL MEDICINE

## 2018-05-08 PROCEDURE — 99999 PR PBB SHADOW E&M-EST. PATIENT-LVL III: CPT | Mod: PBBFAC,,, | Performed by: INTERNAL MEDICINE

## 2018-05-08 PROCEDURE — 99396 PREV VISIT EST AGE 40-64: CPT | Mod: S$GLB,,, | Performed by: INTERNAL MEDICINE

## 2018-05-08 RX ORDER — AMLODIPINE BESYLATE 10 MG/1
10 TABLET ORAL DAILY
Qty: 90 TABLET | Refills: 3 | Status: SHIPPED | OUTPATIENT
Start: 2018-05-08 | End: 2019-05-22 | Stop reason: SDUPTHER

## 2018-05-08 RX ORDER — LISINOPRIL 40 MG/1
40 TABLET ORAL DAILY
Qty: 90 TABLET | Refills: 3 | Status: SHIPPED | OUTPATIENT
Start: 2018-05-08 | End: 2019-04-01

## 2018-05-08 RX ORDER — HYDROCODONE BITARTRATE AND ACETAMINOPHEN 7.5; 325 MG/1; MG/1
1 TABLET ORAL EVERY 6 HOURS PRN
Qty: 9 TABLET | Refills: 0 | Status: CANCELLED | OUTPATIENT
Start: 2018-05-08

## 2018-05-08 NOTE — ASSESSMENT & PLAN NOTE
The current medical regimen is effective;  continue present plan and medications. If loss of control will check stool for h pylori
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
## 2018-09-24 ENCOUNTER — OFFICE VISIT (OUTPATIENT)
Dept: URGENT CARE | Facility: CLINIC | Age: 58
End: 2018-09-24
Payer: COMMERCIAL

## 2018-09-24 VITALS
HEART RATE: 70 BPM | DIASTOLIC BLOOD PRESSURE: 85 MMHG | TEMPERATURE: 97 F | RESPIRATION RATE: 18 BRPM | SYSTOLIC BLOOD PRESSURE: 127 MMHG | BODY MASS INDEX: 27.28 KG/M2 | OXYGEN SATURATION: 98 % | WEIGHT: 180 LBS | HEIGHT: 68 IN

## 2018-09-24 DIAGNOSIS — J32.9 SINUSITIS, UNSPECIFIED CHRONICITY, UNSPECIFIED LOCATION: Primary | ICD-10-CM

## 2018-09-24 DIAGNOSIS — J04.0 LARYNGITIS: ICD-10-CM

## 2018-09-24 PROCEDURE — 99214 OFFICE O/P EST MOD 30 MIN: CPT | Mod: S$GLB,,, | Performed by: NURSE PRACTITIONER

## 2018-09-24 PROCEDURE — 3008F BODY MASS INDEX DOCD: CPT | Mod: CPTII,S$GLB,, | Performed by: NURSE PRACTITIONER

## 2018-09-24 PROCEDURE — 3079F DIAST BP 80-89 MM HG: CPT | Mod: CPTII,S$GLB,, | Performed by: NURSE PRACTITIONER

## 2018-09-24 PROCEDURE — 3074F SYST BP LT 130 MM HG: CPT | Mod: CPTII,S$GLB,, | Performed by: NURSE PRACTITIONER

## 2018-09-24 RX ORDER — AMOXICILLIN AND CLAVULANATE POTASSIUM 875; 125 MG/1; MG/1
1 TABLET, FILM COATED ORAL 2 TIMES DAILY
Qty: 20 TABLET | Refills: 0 | Status: SHIPPED | OUTPATIENT
Start: 2018-09-24 | End: 2018-09-24 | Stop reason: SDUPTHER

## 2018-09-24 RX ORDER — AMOXICILLIN AND CLAVULANATE POTASSIUM 875; 125 MG/1; MG/1
1 TABLET, FILM COATED ORAL 2 TIMES DAILY
Qty: 20 TABLET | Refills: 0 | Status: SHIPPED | OUTPATIENT
Start: 2018-09-24 | End: 2018-10-04

## 2018-09-24 RX ORDER — PREDNISONE 20 MG/1
20 TABLET ORAL DAILY
Qty: 3 TABLET | Refills: 0 | Status: SHIPPED | OUTPATIENT
Start: 2018-09-24 | End: 2018-09-24 | Stop reason: SDUPTHER

## 2018-09-24 RX ORDER — IPRATROPIUM BROMIDE 21 UG/1
2 SPRAY, METERED NASAL 3 TIMES DAILY PRN
Qty: 30 ML | Refills: 0 | Status: SHIPPED | OUTPATIENT
Start: 2018-09-24 | End: 2018-09-24

## 2018-09-24 RX ORDER — PREDNISONE 20 MG/1
20 TABLET ORAL DAILY
Qty: 3 TABLET | Refills: 0 | Status: SHIPPED | OUTPATIENT
Start: 2018-09-24 | End: 2018-09-27

## 2018-09-24 NOTE — PROGRESS NOTES
"Subjective:       Patient ID: Kendra Griffin is a 57 y.o. female.    Vitals:  height is 5' 8" (1.727 m) and weight is 81.6 kg (180 lb). Her temperature is 97 °F (36.1 °C). Her blood pressure is 127/85 and her pulse is 70. Her respiration is 18 and oxygen saturation is 98%.     Chief Complaint: Sore Throat    Sore Throat    This is a recurrent problem. Episode onset: 4months. There has been no fever. Associated symptoms include a hoarse voice. Pertinent negatives include no abdominal pain, congestion, coughing, diarrhea, drooling, ear discharge, ear pain, headaches, plugged ear sensation, neck pain, shortness of breath, stridor, swollen glands, trouble swallowing or vomiting.     Review of Systems   Constitution: Negative for chills and fever.   HENT: Positive for hoarse voice and sore throat. Negative for congestion, drooling, ear discharge, ear pain, stridor and trouble swallowing.    Eyes: Negative for blurred vision.   Cardiovascular: Negative for chest pain.   Respiratory: Negative for cough and shortness of breath.    Skin: Negative for rash.   Musculoskeletal: Negative for back pain, joint pain and neck pain.   Gastrointestinal: Negative for abdominal pain, diarrhea, nausea and vomiting.   Neurological: Negative for headaches.   Psychiatric/Behavioral: The patient is not nervous/anxious.    All other systems reviewed and are negative.      Objective:      Physical Exam   Constitutional: She is oriented to person, place, and time. She appears well-developed and well-nourished. She is cooperative.  Non-toxic appearance. She does not appear ill. No distress.   HENT:   Head: Normocephalic and atraumatic.   Right Ear: Hearing, external ear and ear canal normal. A middle ear effusion is present.   Left Ear: Hearing, external ear and ear canal normal. A middle ear effusion is present.   Nose: Mucosal edema and rhinorrhea present. No nasal deformity. No epistaxis. Right sinus exhibits maxillary sinus tenderness. Right " sinus exhibits no frontal sinus tenderness. Left sinus exhibits maxillary sinus tenderness. Left sinus exhibits no frontal sinus tenderness.   Mouth/Throat: Uvula is midline and mucous membranes are normal. No trismus in the jaw. Normal dentition. No uvula swelling. Oropharyngeal exudate present. No posterior oropharyngeal edema or posterior oropharyngeal erythema.   Eyes: Conjunctivae and lids are normal. No scleral icterus.   Sclera clear bilat   Neck: Trachea normal, full passive range of motion without pain and phonation normal. Neck supple.   Cardiovascular: Normal rate, regular rhythm, normal heart sounds, intact distal pulses and normal pulses.   Pulmonary/Chest: Effort normal and breath sounds normal. No respiratory distress.   Abdominal: Soft. Normal appearance and bowel sounds are normal. She exhibits no distension. There is no tenderness.   Musculoskeletal: Normal range of motion. She exhibits no edema or deformity.   Neurological: She is alert and oriented to person, place, and time. She exhibits normal muscle tone. Coordination normal.   Skin: Skin is warm, dry and intact. She is not diaphoretic. No pallor.   Psychiatric: She has a normal mood and affect. Her speech is normal and behavior is normal. Judgment and thought content normal. Cognition and memory are normal.   Nursing note and vitals reviewed.      Assessment:       1. Sinusitis, unspecified chronicity, unspecified location    2. Laryngitis        Plan:         Sinusitis, unspecified chronicity, unspecified location  -     amoxicillin-clavulanate 875-125mg (AUGMENTIN) 875-125 mg per tablet; Take 1 tablet by mouth 2 (two) times daily. for 10 days  Dispense: 20 tablet; Refill: 0  -     ipratropium (ATROVENT) 0.03 % nasal spray; 2 sprays by Nasal route 3 (three) times daily as needed. Use no more than 4 days.  Dispense: 30 mL; Refill: 0  -     predniSONE (DELTASONE) 20 MG tablet; Take 1 tablet (20 mg total) by mouth once daily. for 3 days   "Dispense: 3 tablet; Refill: 0    Laryngitis  -     predniSONE (DELTASONE) 20 MG tablet; Take 1 tablet (20 mg total) by mouth once daily. for 3 days  Dispense: 3 tablet; Refill: 0    Other orders  -     Discontinue: amoxicillin-clavulanate 875-125mg (AUGMENTIN) 875-125 mg per tablet; Take 1 tablet by mouth 2 (two) times daily. for 10 days  Dispense: 20 tablet; Refill: 0      Patient Instructions   Please follow up with your Primary care provider within 2-5 days if your signs and symptoms have not resolved or worsen.  The usual course of cold symptoms are 10-14 days.     If your condition worsens or fails to improve we recommend that you receive another evaluation at the emergency room immediately or contact your primary medical clinic to discuss your concerns.     You must understand that you have received an Urgent Care treatment only and that you may be released before all of your medical problems are known or treated.   You, the patient, will arrange for follow up care as instructed.     Tylenol or Ibuprofen can also be used as directed for pain/fever unless you have an allergy to them or medical condition such as stomach ulcers, kidney or liver disease or blood thinners etc for which you should not be taking these type of medications.     Take over the counter cough medication as directed as needed for cough.  You should avoid medications with pseudoephedrine or phenylephrine (any medication with "D") if you have high blood pressure as this can cause an elevation in your blood pressure. Instead consider Corcidin HBP as needed to prevent an elevated blood pressure.     Natural remedies of symptoms (as needed) include humidification, saline nasal sprays, and/or steamy showers.  Increase fluids, warm tea with honey, cough drops as needed.  You may also use salt water gargles for sore throat.    IF you received a steroid shot today - As discussed, this can elevate your blood pressure, elevate your blood sugar, water " weight gain, nervous energy, redness to the face and dimpling of the skin at the injection site.     You have been given Augmentin for an infection today.  Please take all the antibiotic as prescribed on the bottle.      Augmentin is hard on the stomach and should always be taken with food.      Augmentin can cause diarrhea.  As with any antibiotics, use probiotics and/or high culture yogurt about 2 hours apart from the antibiotic and about 1 week after the antibiotic to replace the gut immanuel lost with antibiotic use.      If you are female and on BCP use additional methods to prevent pregnancy while on antibiotics and for one cycle after.       Sinusitis (Antibiotic Treatment)    The sinuses are air-filled spaces within the bones of the face. They connect to the inside of the nose. Sinusitis is an inflammation of the tissue lining the sinus cavity. Sinus inflammation can occur during a cold. It can also be due to allergies to pollens and other particles in the air. Sinusitis can cause symptoms of sinus congestion and fullness. A sinus infection causes fever, headache and facial pain. There is often green or yellow drainage from the nose or into the back of the throat (post-nasal drip). You have been given antibiotics to treat this condition.  Home care:  · Take the full course of antibiotics as instructed. Do not stop taking them, even if you feel better.  · Drink plenty of water, hot tea, and other liquids. This may help thin mucus. It also may promote sinus drainage.  · Heat may help soothe painful areas of the face. Use a towel soaked in hot water. Or,  the shower and direct the hot spray onto your face. Using a vaporizer along with a menthol rub at night may also help.   · An expectorant containing guaifenesin may help thin the mucus and promote drainage from the sinuses.  · Over-the-counter decongestants may be used unless a similar medicine was prescribed. Nasal sprays work the fastest. Use one that  contains phenylephrine or oxymetazoline. First blow the nose gently. Then use the spray. Do not use these medicines more often than directed on the label or symptoms may get worse. You may also use tablets containing pseudoephedrine. Avoid products that combine ingredients, because side effects may be increased. Read labels. You can also ask the pharmacist for help. (NOTE: Persons with high blood pressure should not use decongestants. They can raise blood pressure.)  · Over-the-counter antihistamines may help if allergies contributed to your sinusitis.    · Do not use nasal rinses or irrigation during an acute sinus infection, unless told to by your health care provider. Rinsing may spread the infection to other sinuses.  · Use acetaminophen or ibuprofen to control pain, unless another pain medicine was prescribed. (If you have chronic liver or kidney disease or ever had a stomach ulcer, talk with your doctor before using these medicines. Aspirin should never be used in anyone under 18 years of age who is ill with a fever. It may cause severe liver damage.)  · Don't smoke. This can worsen symptoms.  Follow-up care  Follow up with your healthcare provider or our staff if you are not improving within the next week.  When to seek medical advice  Call your healthcare provider if any of these occur:  · Facial pain or headache becoming more severe  · Stiff neck  · Unusual drowsiness or confusion  · Swelling of the forehead or eyelids  · Vision problems, including blurred or double vision  · Fever of 100.4ºF (38ºC) or higher, or as directed by your healthcare provider  · Seizure  · Breathing problems  · Symptoms not resolving within 10 days  Date Last Reviewed: 4/13/2015  © 7320-7350 [employer]. [address] [place] [address], [place] [address]. All rights reserved. This information is not intended as a substitute for professional medical care. Always follow your healthcare professional's  instructions.

## 2018-09-24 NOTE — PATIENT INSTRUCTIONS
"Please follow up with your Primary care provider within 2-5 days if your signs and symptoms have not resolved or worsen.  The usual course of cold symptoms are 10-14 days.     If your condition worsens or fails to improve we recommend that you receive another evaluation at the emergency room immediately or contact your primary medical clinic to discuss your concerns.     You must understand that you have received an Urgent Care treatment only and that you may be released before all of your medical problems are known or treated.   You, the patient, will arrange for follow up care as instructed.     Tylenol or Ibuprofen can also be used as directed for pain/fever unless you have an allergy to them or medical condition such as stomach ulcers, kidney or liver disease or blood thinners etc for which you should not be taking these type of medications.     Take over the counter cough medication as directed as needed for cough.  You should avoid medications with pseudoephedrine or phenylephrine (any medication with "D") if you have high blood pressure as this can cause an elevation in your blood pressure. Instead consider Corcidin HBP as needed to prevent an elevated blood pressure.     Natural remedies of symptoms (as needed) include humidification, saline nasal sprays, and/or steamy showers.  Increase fluids, warm tea with honey, cough drops as needed.  You may also use salt water gargles for sore throat.    IF you received a steroid shot today - As discussed, this can elevate your blood pressure, elevate your blood sugar, water weight gain, nervous energy, redness to the face and dimpling of the skin at the injection site.     You have been given Augmentin for an infection today.  Please take all the antibiotic as prescribed on the bottle.      Augmentin is hard on the stomach and should always be taken with food.      Augmentin can cause diarrhea.  As with any antibiotics, use probiotics and/or high culture yogurt about " 2 hours apart from the antibiotic and about 1 week after the antibiotic to replace the gut immanuel lost with antibiotic use.      If you are female and on BCP use additional methods to prevent pregnancy while on antibiotics and for one cycle after.       Sinusitis (Antibiotic Treatment)    The sinuses are air-filled spaces within the bones of the face. They connect to the inside of the nose. Sinusitis is an inflammation of the tissue lining the sinus cavity. Sinus inflammation can occur during a cold. It can also be due to allergies to pollens and other particles in the air. Sinusitis can cause symptoms of sinus congestion and fullness. A sinus infection causes fever, headache and facial pain. There is often green or yellow drainage from the nose or into the back of the throat (post-nasal drip). You have been given antibiotics to treat this condition.  Home care:  · Take the full course of antibiotics as instructed. Do not stop taking them, even if you feel better.  · Drink plenty of water, hot tea, and other liquids. This may help thin mucus. It also may promote sinus drainage.  · Heat may help soothe painful areas of the face. Use a towel soaked in hot water. Or,  the shower and direct the hot spray onto your face. Using a vaporizer along with a menthol rub at night may also help.   · An expectorant containing guaifenesin may help thin the mucus and promote drainage from the sinuses.  · Over-the-counter decongestants may be used unless a similar medicine was prescribed. Nasal sprays work the fastest. Use one that contains phenylephrine or oxymetazoline. First blow the nose gently. Then use the spray. Do not use these medicines more often than directed on the label or symptoms may get worse. You may also use tablets containing pseudoephedrine. Avoid products that combine ingredients, because side effects may be increased. Read labels. You can also ask the pharmacist for help. (NOTE: Persons with high blood  pressure should not use decongestants. They can raise blood pressure.)  · Over-the-counter antihistamines may help if allergies contributed to your sinusitis.    · Do not use nasal rinses or irrigation during an acute sinus infection, unless told to by your health care provider. Rinsing may spread the infection to other sinuses.  · Use acetaminophen or ibuprofen to control pain, unless another pain medicine was prescribed. (If you have chronic liver or kidney disease or ever had a stomach ulcer, talk with your doctor before using these medicines. Aspirin should never be used in anyone under 18 years of age who is ill with a fever. It may cause severe liver damage.)  · Don't smoke. This can worsen symptoms.  Follow-up care  Follow up with your healthcare provider or our staff if you are not improving within the next week.  When to seek medical advice  Call your healthcare provider if any of these occur:  · Facial pain or headache becoming more severe  · Stiff neck  · Unusual drowsiness or confusion  · Swelling of the forehead or eyelids  · Vision problems, including blurred or double vision  · Fever of 100.4ºF (38ºC) or higher, or as directed by your healthcare provider  · Seizure  · Breathing problems  · Symptoms not resolving within 10 days  Date Last Reviewed: 4/13/2015  © 9348-6796 The [employer]. [address] [place] [address], [place] [address]. All rights reserved. This information is not intended as a substitute for professional medical care. Always follow your healthcare professional's instructions.

## 2018-09-26 DIAGNOSIS — Z23 NEED FOR PROPHYLACTIC VACCINATION AND INOCULATION AGAINST INFLUENZA: Primary | ICD-10-CM

## 2018-09-26 DIAGNOSIS — Z23 NEED FOR TDAP VACCINATION: ICD-10-CM

## 2018-09-27 ENCOUNTER — TELEPHONE (OUTPATIENT)
Dept: URGENT CARE | Facility: CLINIC | Age: 58
End: 2018-09-27

## 2018-10-08 ENCOUNTER — PATIENT MESSAGE (OUTPATIENT)
Dept: FAMILY MEDICINE | Facility: CLINIC | Age: 58
End: 2018-10-08

## 2018-10-22 ENCOUNTER — TELEPHONE (OUTPATIENT)
Dept: ADMINISTRATIVE | Facility: HOSPITAL | Age: 58
End: 2018-10-22

## 2018-10-29 ENCOUNTER — TELEPHONE (OUTPATIENT)
Dept: FAMILY MEDICINE | Facility: CLINIC | Age: 58
End: 2018-10-29

## 2018-10-29 NOTE — TELEPHONE ENCOUNTER
----- Message from Eyad Enrique sent at 10/29/2018  1:27 PM CDT -----  Contact: Kendra [phone]  Patient is requesting an appointment with Dr. Knox. She has been experiencing hoarseness for the last 2-5 months and would like to be seen. Please call at your earliest convenience.

## 2018-11-23 ENCOUNTER — OFFICE VISIT (OUTPATIENT)
Dept: FAMILY MEDICINE | Facility: CLINIC | Age: 58
End: 2018-11-23
Payer: COMMERCIAL

## 2018-11-23 VITALS
OXYGEN SATURATION: 98 % | WEIGHT: 192 LBS | BODY MASS INDEX: 30.13 KG/M2 | TEMPERATURE: 98 F | HEART RATE: 89 BPM | HEIGHT: 67 IN | DIASTOLIC BLOOD PRESSURE: 102 MMHG | SYSTOLIC BLOOD PRESSURE: 154 MMHG

## 2018-11-23 DIAGNOSIS — F51.04 PSYCHOPHYSIOLOGICAL INSOMNIA: Chronic | ICD-10-CM

## 2018-11-23 DIAGNOSIS — Z23 FLU VACCINE NEED: ICD-10-CM

## 2018-11-23 DIAGNOSIS — F41.0 PANIC ATTACKS: ICD-10-CM

## 2018-11-23 DIAGNOSIS — R49.0 HOARSENESS OF VOICE: Primary | ICD-10-CM

## 2018-11-23 DIAGNOSIS — I10 ESSENTIAL (PRIMARY) HYPERTENSION: Chronic | ICD-10-CM

## 2018-11-23 DIAGNOSIS — Z12.11 ENCOUNTER FOR FIT (FECAL IMMUNOCHEMICAL TEST) SCREENING: ICD-10-CM

## 2018-11-23 PROCEDURE — 99214 OFFICE O/P EST MOD 30 MIN: CPT | Mod: 25,S$GLB,, | Performed by: INTERNAL MEDICINE

## 2018-11-23 PROCEDURE — 90686 IIV4 VACC NO PRSV 0.5 ML IM: CPT | Mod: S$GLB,,, | Performed by: INTERNAL MEDICINE

## 2018-11-23 PROCEDURE — 99999 PR PBB SHADOW E&M-EST. PATIENT-LVL III: CPT | Mod: PBBFAC,,, | Performed by: INTERNAL MEDICINE

## 2018-11-23 PROCEDURE — 90471 IMMUNIZATION ADMIN: CPT | Mod: S$GLB,,, | Performed by: INTERNAL MEDICINE

## 2018-11-23 RX ORDER — HYDROXYZINE PAMOATE 25 MG/1
CAPSULE ORAL
Qty: 90 CAPSULE | Refills: 0 | Status: SHIPPED | OUTPATIENT
Start: 2018-11-23 | End: 2019-05-22 | Stop reason: SDUPTHER

## 2018-11-23 NOTE — ASSESSMENT & PLAN NOTE
Restart meds.  Ideally would like her to restart digital HTN program.  Nurse BP check in 1-2 weeks
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
## 2018-11-23 NOTE — PATIENT INSTRUCTIONS
Please call [phone] to schedule your appointment with the counselor at the [facility].  This appointment must be scheduled by the patient themselves; we are unable to schedule it for you.

## 2018-11-27 ENCOUNTER — TELEPHONE (OUTPATIENT)
Dept: FAMILY MEDICINE | Facility: CLINIC | Age: 58
End: 2018-11-27

## 2018-11-27 NOTE — LETTER
November 27, 2018    Kendra STEPHEN [id number]             [facility]  [address]  [place] [address]  Phone: [phone]  Fax: [phone] Dear Ms. Griffin:    I have been unable to reach you by phone for your appointment to ENT. Please call me at the clinic [phone] to book your appointment.      If you have any questions or concerns, please don't hesitate to call.    Sincerely,        Manisha Petit MA

## 2018-11-27 NOTE — TELEPHONE ENCOUNTER
Attempted to contact the patient regarding a referral to ENT, unable to leave a message. Letter mailed

## 2018-11-28 PROCEDURE — 82274 ASSAY TEST FOR BLOOD FECAL: CPT

## 2018-12-03 ENCOUNTER — LAB VISIT (OUTPATIENT)
Dept: LAB | Facility: HOSPITAL | Age: 58
End: 2018-12-03
Attending: INTERNAL MEDICINE
Payer: COMMERCIAL

## 2018-12-03 DIAGNOSIS — Z12.11 ENCOUNTER FOR FIT (FECAL IMMUNOCHEMICAL TEST) SCREENING: ICD-10-CM

## 2018-12-03 LAB — HEMOCCULT STL QL IA: NEGATIVE

## 2018-12-03 NOTE — PROGRESS NOTES
Your stool results are normal.  Please continue your current medications and doses.  Please feel free to contact me with any questions or concerns.    Sincerely,  Armando Knox  [email or web address]

## 2019-01-26 ENCOUNTER — PATIENT MESSAGE (OUTPATIENT)
Dept: ADMINISTRATIVE | Facility: OTHER | Age: 59
End: 2019-01-26

## 2019-01-26 ENCOUNTER — OFFICE VISIT (OUTPATIENT)
Dept: URGENT CARE | Facility: CLINIC | Age: 59
End: 2019-01-26
Payer: COMMERCIAL

## 2019-01-26 VITALS
SYSTOLIC BLOOD PRESSURE: 141 MMHG | OXYGEN SATURATION: 99 % | TEMPERATURE: 97 F | HEART RATE: 69 BPM | WEIGHT: 192 LBS | BODY MASS INDEX: 30.13 KG/M2 | HEIGHT: 67 IN | RESPIRATION RATE: 18 BRPM | DIASTOLIC BLOOD PRESSURE: 95 MMHG

## 2019-01-26 DIAGNOSIS — B96.89 ACUTE BACTERIAL SINUSITIS: Primary | ICD-10-CM

## 2019-01-26 DIAGNOSIS — J01.90 ACUTE BACTERIAL SINUSITIS: Primary | ICD-10-CM

## 2019-01-26 PROCEDURE — 3077F SYST BP >= 140 MM HG: CPT | Mod: CPTII,S$GLB,, | Performed by: NURSE PRACTITIONER

## 2019-01-26 PROCEDURE — 99214 PR OFFICE/OUTPT VISIT, EST, LEVL IV, 30-39 MIN: ICD-10-PCS | Mod: S$GLB,,, | Performed by: NURSE PRACTITIONER

## 2019-01-26 PROCEDURE — 3008F PR BODY MASS INDEX (BMI) DOCUMENTED: ICD-10-PCS | Mod: CPTII,S$GLB,, | Performed by: NURSE PRACTITIONER

## 2019-01-26 PROCEDURE — 3077F PR MOST RECENT SYSTOLIC BLOOD PRESSURE >= 140 MM HG: ICD-10-PCS | Mod: CPTII,S$GLB,, | Performed by: NURSE PRACTITIONER

## 2019-01-26 PROCEDURE — 3008F BODY MASS INDEX DOCD: CPT | Mod: CPTII,S$GLB,, | Performed by: NURSE PRACTITIONER

## 2019-01-26 PROCEDURE — 3080F DIAST BP >= 90 MM HG: CPT | Mod: CPTII,S$GLB,, | Performed by: NURSE PRACTITIONER

## 2019-01-26 PROCEDURE — 3080F PR MOST RECENT DIASTOLIC BLOOD PRESSURE >= 90 MM HG: ICD-10-PCS | Mod: CPTII,S$GLB,, | Performed by: NURSE PRACTITIONER

## 2019-01-26 PROCEDURE — 99214 OFFICE O/P EST MOD 30 MIN: CPT | Mod: S$GLB,,, | Performed by: NURSE PRACTITIONER

## 2019-01-26 RX ORDER — DOXYCYCLINE 100 MG/1
100 CAPSULE ORAL 2 TIMES DAILY
Qty: 20 CAPSULE | Refills: 0 | Status: SHIPPED | OUTPATIENT
Start: 2019-01-26 | End: 2019-02-05

## 2019-01-26 RX ORDER — METHYLPREDNISOLONE 4 MG/1
4 TABLET ORAL
Qty: 1 PACKAGE | Refills: 0 | Status: SHIPPED | OUTPATIENT
Start: 2019-01-26 | End: 2019-02-01

## 2019-01-26 NOTE — PATIENT INSTRUCTIONS
Sinusitis (Antibiotic Treatment)    The sinuses are air-filled spaces within the bones of the face. They connect to the inside of the nose. Sinusitis is an inflammation of the tissue lining the sinus cavity. Sinus inflammation can occur during a cold. It can also be due to allergies to pollens and other particles in the air. Sinusitis can cause symptoms of sinus congestion and fullness. A sinus infection causes fever, headache and facial pain. There is often green or yellow drainage from the nose or into the back of the throat (post-nasal drip). You have been given antibiotics to treat this condition.  Home care:  · Take the full course of antibiotics as instructed. Do not stop taking them, even if you feel better.  · Drink plenty of water, hot tea, and other liquids. This may help thin mucus. It also may promote sinus drainage.  · Heat may help soothe painful areas of the face. Use a towel soaked in hot water. Or,  the shower and direct the hot spray onto your face. Using a vaporizer along with a menthol rub at night may also help.   · An expectorant containing guaifenesin may help thin the mucus and promote drainage from the sinuses.  · Over-the-counter decongestants may be used unless a similar medicine was prescribed. Nasal sprays work the fastest. Use one that contains phenylephrine or oxymetazoline. First blow the nose gently. Then use the spray. Do not use these medicines more often than directed on the label or symptoms may get worse. You may also use tablets containing pseudoephedrine. Avoid products that combine ingredients, because side effects may be increased. Read labels. You can also ask the pharmacist for help. (NOTE: Persons with high blood pressure should not use decongestants. They can raise blood pressure.)  · Over-the-counter antihistamines may help if allergies contributed to your sinusitis.    · Do not use nasal rinses or irrigation during an acute sinus infection, unless told to by  your health care provider. Rinsing may spread the infection to other sinuses.  · Use acetaminophen or ibuprofen to control pain, unless another pain medicine was prescribed. (If you have chronic liver or kidney disease or ever had a stomach ulcer, talk with your doctor before using these medicines. Aspirin should never be used in anyone under 18 years of age who is ill with a fever. It may cause severe liver damage.)  · Don't smoke. This can worsen symptoms.  Follow-up care  Follow up with your healthcare provider or our staff if you are not improving within the next week.  When to seek medical advice  Call your healthcare provider if any of these occur:  · Facial pain or headache becoming more severe  · Stiff neck  · Unusual drowsiness or confusion  · Swelling of the forehead or eyelids  · Vision problems, including blurred or double vision  · Fever of 100.4ºF (38ºC) or higher, or as directed by your healthcare provider  · Seizure  · Breathing problems  · Symptoms not resolving within 10 days  Date Last Reviewed: 4/13/2015  © 7885-4656 [email or web address]. [address] [place] [address]. All rights reserved. This information is not intended as a substitute for professional medical care. Always follow your healthcare professional's instructions.                                                                    Sinusitis   If your condition worsens or fails to improve we recommend that you receive another evaluation at the ER immediately or contact your PCP to discuss your concerns or return here. You must understand that you've received an urgent care treatment only and that you may be released before all your medical problems are known or treated. You the patient will arrange for followup care as instructed.   If we discussed that I think your illness is viral it will not respond to antibiotics and it will last 10-14 days. However, if over the next few days the symptoms worsen start the  "antibiotics I have given you.   If we discussed that you require antibiotics start them now and take them to completion.   If you are female and on BCP and do take the antibiotics, use additional methods to prevent pregnancy while on the antibiotics and for one cycle after.   Flonase (fluticasone) is a nasal spray which is available over the counter and may help with your symptoms   Zyrtec D, Claritin D or allegra D can also help with symptoms of congestion and drainage.   If you have hypertension avoid using the "D" which is the decongestant   If you just have drainage you can take plain zyrtec, claritin or allegra   If you just have a congested feeling you can take pseudoephedrine (unless you have high blood pressure) which you have to sign for behind the counter. Do not buy the phenylephrine which is on the shelf as it is not effective   Rest and fluids are also important.   Tylenol or ibuprofen can also be used as directed for pain unless you have an allergy to them or medical condition such as stomach ulcers, kidney or liver disease or blood thinners etc for which you should not be taking these type of medications.   If you are flying in the next few days Afrin nose drops for the airplane flight upon take off and landing may help. Other than at those times refrain from using afrin.   If you were prescribed a narcotic do not drive or operate heavy machinery while taking these medications.     -Please take antibiotic to completion.  -Below are suggestions for symptomatic relief:              -Tylenol every 4 hours OR ibuprofen every 6 hours as needed for pain/fever.              -Salt water gargles to soothe throat pain.              -Chloroseptic spray also helps to numb throat pain.              -Nasal saline spray reduces inflammation and dryness.              -Warm face compresses to help with facial sinus pain/pressure.              -Vicks vapor rub at night.              -Flonase OTC or Nasacort OTC for " nasal congestion.              -Simple foods like chicken noodle soup.              -Delsym helps with coughing at night              -Zyrtec/Claritin during the day & Benadryl at night may help with allergies.    -Flonase daily.  -Zyrtec daily.  -Medrol dose pack.  -10 days of antibiotics.  Please follow up with your Primary care provider within 2-5 days if your signs and symptoms have not resolved or worsen.     If your condition worsens or fails to improve we recommend that you receive another evaluation at the emergency room immediately or contact your primary medical clinic to discuss your concerns.   You must understand that you have received an Urgent Care treatment only and that you may be released before all of your medical problems are known or treated. You, the patient, will arrange for follow up care as instructed.

## 2019-01-29 ENCOUNTER — TELEPHONE (OUTPATIENT)
Dept: URGENT CARE | Facility: CLINIC | Age: 59
End: 2019-01-29

## 2019-03-21 ENCOUNTER — PATIENT MESSAGE (OUTPATIENT)
Dept: ADMINISTRATIVE | Facility: OTHER | Age: 59
End: 2019-03-21

## 2019-04-01 ENCOUNTER — OFFICE VISIT (OUTPATIENT)
Dept: FAMILY MEDICINE | Facility: CLINIC | Age: 59
End: 2019-04-01
Payer: COMMERCIAL

## 2019-04-01 VITALS
RESPIRATION RATE: 18 BRPM | WEIGHT: 192 LBS | BODY MASS INDEX: 30.13 KG/M2 | TEMPERATURE: 98 F | HEIGHT: 67 IN | OXYGEN SATURATION: 97 % | DIASTOLIC BLOOD PRESSURE: 88 MMHG | HEART RATE: 67 BPM | SYSTOLIC BLOOD PRESSURE: 136 MMHG

## 2019-04-01 DIAGNOSIS — Z12.31 ENCOUNTER FOR SCREENING MAMMOGRAM FOR BREAST CANCER: ICD-10-CM

## 2019-04-01 DIAGNOSIS — Z23 NEED FOR TDAP VACCINATION: ICD-10-CM

## 2019-04-01 DIAGNOSIS — M54.50 CHRONIC BILATERAL LOW BACK PAIN WITHOUT SCIATICA: ICD-10-CM

## 2019-04-01 DIAGNOSIS — G89.29 CHRONIC BILATERAL LOW BACK PAIN WITHOUT SCIATICA: ICD-10-CM

## 2019-04-01 DIAGNOSIS — I10 ESSENTIAL (PRIMARY) HYPERTENSION: Primary | Chronic | ICD-10-CM

## 2019-04-01 PROCEDURE — 99999 PR PBB SHADOW E&M-EST. PATIENT-LVL III: CPT | Mod: PBBFAC,,, | Performed by: INTERNAL MEDICINE

## 2019-04-01 PROCEDURE — 90471 TDAP VACCINE GREATER THAN OR EQUAL TO 7YO IM: ICD-10-PCS | Mod: S$GLB,,, | Performed by: INTERNAL MEDICINE

## 2019-04-01 PROCEDURE — 90715 TDAP VACCINE GREATER THAN OR EQUAL TO 7YO IM: ICD-10-PCS | Mod: S$GLB,,, | Performed by: INTERNAL MEDICINE

## 2019-04-01 PROCEDURE — 99214 PR OFFICE/OUTPT VISIT, EST, LEVL IV, 30-39 MIN: ICD-10-PCS | Mod: 25,S$GLB,, | Performed by: INTERNAL MEDICINE

## 2019-04-01 PROCEDURE — 3075F PR MOST RECENT SYSTOLIC BLOOD PRESS GE 130-139MM HG: ICD-10-PCS | Mod: CPTII,S$GLB,, | Performed by: INTERNAL MEDICINE

## 2019-04-01 PROCEDURE — 3079F DIAST BP 80-89 MM HG: CPT | Mod: CPTII,S$GLB,, | Performed by: INTERNAL MEDICINE

## 2019-04-01 PROCEDURE — 3008F PR BODY MASS INDEX (BMI) DOCUMENTED: ICD-10-PCS | Mod: CPTII,S$GLB,, | Performed by: INTERNAL MEDICINE

## 2019-04-01 PROCEDURE — 99214 OFFICE O/P EST MOD 30 MIN: CPT | Mod: 25,S$GLB,, | Performed by: INTERNAL MEDICINE

## 2019-04-01 PROCEDURE — 99999 PR PBB SHADOW E&M-EST. PATIENT-LVL III: ICD-10-PCS | Mod: PBBFAC,,, | Performed by: INTERNAL MEDICINE

## 2019-04-01 PROCEDURE — 3079F PR MOST RECENT DIASTOLIC BLOOD PRESSURE 80-89 MM HG: ICD-10-PCS | Mod: CPTII,S$GLB,, | Performed by: INTERNAL MEDICINE

## 2019-04-01 PROCEDURE — 3008F BODY MASS INDEX DOCD: CPT | Mod: CPTII,S$GLB,, | Performed by: INTERNAL MEDICINE

## 2019-04-01 PROCEDURE — 3075F SYST BP GE 130 - 139MM HG: CPT | Mod: CPTII,S$GLB,, | Performed by: INTERNAL MEDICINE

## 2019-04-01 PROCEDURE — 90715 TDAP VACCINE 7 YRS/> IM: CPT | Mod: S$GLB,,, | Performed by: INTERNAL MEDICINE

## 2019-04-01 PROCEDURE — 90471 IMMUNIZATION ADMIN: CPT | Mod: S$GLB,,, | Performed by: INTERNAL MEDICINE

## 2019-04-01 RX ORDER — LOSARTAN POTASSIUM 100 MG/1
100 TABLET ORAL DAILY
Qty: 90 TABLET | Refills: 3 | Status: SHIPPED | OUTPATIENT
Start: 2019-04-01 | End: 2019-05-22

## 2019-04-01 RX ORDER — METHOCARBAMOL 750 MG/1
TABLET, FILM COATED ORAL
Qty: 60 TABLET | Refills: 2 | Status: SHIPPED | OUTPATIENT
Start: 2019-04-01 | End: 2020-06-03

## 2019-04-01 RX ORDER — MELOXICAM 15 MG/1
15 TABLET ORAL DAILY PRN
Qty: 90 TABLET | Refills: 0 | Status: SHIPPED | OUTPATIENT
Start: 2019-04-01 | End: 2019-05-22 | Stop reason: SDUPTHER
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
## 2019-04-04 ENCOUNTER — HOSPITAL ENCOUNTER (OUTPATIENT)
Dept: RADIOLOGY | Facility: HOSPITAL | Age: 59
Discharge: HOME | End: 2019-04-04
Attending: INTERNAL MEDICINE
Payer: COMMERCIAL

## 2019-04-04 DIAGNOSIS — Z12.31 ENCOUNTER FOR SCREENING MAMMOGRAM FOR BREAST CANCER: ICD-10-CM

## 2019-04-04 PROCEDURE — 77067 SCR MAMMO BI INCL CAD: CPT | Mod: 26,,, | Performed by: RADIOLOGY

## 2019-04-04 PROCEDURE — 77067 MAMMO DIGITAL SCREENING BILAT WITH TOMOSYNTHESIS_CAD: ICD-10-PCS | Mod: 26,,, | Performed by: RADIOLOGY

## 2019-04-04 PROCEDURE — 77063 MAMMO DIGITAL SCREENING BILAT WITH TOMOSYNTHESIS_CAD: ICD-10-PCS | Mod: 26,,, | Performed by: RADIOLOGY

## 2019-04-04 PROCEDURE — 77067 SCR MAMMO BI INCL CAD: CPT | Mod: TC,PO

## 2019-04-04 PROCEDURE — 77063 BREAST TOMOSYNTHESIS BI: CPT | Mod: 26,,, | Performed by: RADIOLOGY

## 2019-04-04 NOTE — PROGRESS NOTES
Your mammogram results are normal.  Please continue your current medications and doses.  Please feel free to contact me with any questions or concerns.    Sincerely,  Armando Knox  [email or web address]

## 2019-04-10 ENCOUNTER — TELEPHONE (OUTPATIENT)
Dept: OTOLARYNGOLOGY | Facility: CLINIC | Age: 59
End: 2019-04-10

## 2019-04-10 ENCOUNTER — PATIENT MESSAGE (OUTPATIENT)
Dept: FAMILY MEDICINE | Facility: CLINIC | Age: 59
End: 2019-04-10

## 2019-04-11 ENCOUNTER — PATIENT MESSAGE (OUTPATIENT)
Dept: FAMILY MEDICINE | Facility: CLINIC | Age: 59
End: 2019-04-11

## 2019-05-01 ENCOUNTER — LAB VISIT (OUTPATIENT)
Dept: LAB | Facility: HOSPITAL | Age: 59
End: 2019-05-01
Attending: INTERNAL MEDICINE
Payer: COMMERCIAL

## 2019-05-01 DIAGNOSIS — I10 ESSENTIAL (PRIMARY) HYPERTENSION: Chronic | ICD-10-CM

## 2019-05-01 LAB
ALBUMIN SERPL BCP-MCNC: 3.5 G/DL (ref 3.5–5.2)
ALP SERPL-CCNC: 98 U/L (ref 55–135)
ALT SERPL W/O P-5'-P-CCNC: 14 U/L (ref 10–44)
ANION GAP SERPL CALC-SCNC: 5 MMOL/L (ref 8–16)
AST SERPL-CCNC: 19 U/L (ref 10–40)
BASOPHILS # BLD AUTO: 0.03 K/UL (ref 0–0.2)
BASOPHILS NFR BLD: 0.7 % (ref 0–1.9)
BILIRUB SERPL-MCNC: 0.5 MG/DL (ref 0.1–1)
BUN SERPL-MCNC: 11 MG/DL (ref 6–20)
CALCIUM SERPL-MCNC: 9.5 MG/DL (ref 8.7–10.5)
CHLORIDE SERPL-SCNC: 106 MMOL/L (ref 95–110)
CHOLEST SERPL-MCNC: 250 MG/DL (ref 120–199)
CHOLEST/HDLC SERPL: 3.5 {RATIO} (ref 2–5)
CO2 SERPL-SCNC: 28 MMOL/L (ref 23–29)
CREAT SERPL-MCNC: 0.8 MG/DL (ref 0.5–1.4)
DIFFERENTIAL METHOD: ABNORMAL
EOSINOPHIL # BLD AUTO: 0.2 K/UL (ref 0–0.5)
EOSINOPHIL NFR BLD: 5.9 % (ref 0–8)
ERYTHROCYTE [DISTWIDTH] IN BLOOD BY AUTOMATED COUNT: 16.1 % (ref 11.5–14.5)
EST. GFR  (AFRICAN AMERICAN): >60 ML/MIN/1.73 M^2
EST. GFR  (NON AFRICAN AMERICAN): >60 ML/MIN/1.73 M^2
GLUCOSE SERPL-MCNC: 83 MG/DL (ref 70–110)
HCT VFR BLD AUTO: 36.2 % (ref 37–48.5)
HDLC SERPL-MCNC: 72 MG/DL (ref 40–75)
HDLC SERPL: 28.8 % (ref 20–50)
HGB BLD-MCNC: 10.7 G/DL (ref 12–16)
IMM GRANULOCYTES # BLD AUTO: 0.01 K/UL (ref 0–0.04)
IMM GRANULOCYTES NFR BLD AUTO: 0.2 % (ref 0–0.5)
LDLC SERPL CALC-MCNC: 159.6 MG/DL (ref 63–159)
LYMPHOCYTES # BLD AUTO: 1.2 K/UL (ref 1–4.8)
LYMPHOCYTES NFR BLD: 29.2 % (ref 18–48)
MCH RBC QN AUTO: 22.5 PG (ref 27–31)
MCHC RBC AUTO-ENTMCNC: 29.6 G/DL (ref 32–36)
MCV RBC AUTO: 76 FL (ref 82–98)
MONOCYTES # BLD AUTO: 0.3 K/UL (ref 0.3–1)
MONOCYTES NFR BLD: 6.9 % (ref 4–15)
NEUTROPHILS # BLD AUTO: 2.3 K/UL (ref 1.8–7.7)
NEUTROPHILS NFR BLD: 57.1 % (ref 38–73)
NONHDLC SERPL-MCNC: 178 MG/DL
NRBC BLD-RTO: 0 /100 WBC
PLATELET # BLD AUTO: 277 K/UL (ref 150–350)
PMV BLD AUTO: 9.8 FL (ref 9.2–12.9)
POTASSIUM SERPL-SCNC: 3.7 MMOL/L (ref 3.5–5.1)
PROT SERPL-MCNC: 6.9 G/DL (ref 6–8.4)
RBC # BLD AUTO: 4.76 M/UL (ref 4–5.4)
SODIUM SERPL-SCNC: 139 MMOL/L (ref 136–145)
TRIGL SERPL-MCNC: 92 MG/DL (ref 30–150)
WBC # BLD AUTO: 4.04 K/UL (ref 3.9–12.7)

## 2019-05-01 PROCEDURE — 80053 COMPREHEN METABOLIC PANEL: CPT

## 2019-05-01 PROCEDURE — 85025 COMPLETE CBC W/AUTO DIFF WBC: CPT

## 2019-05-01 PROCEDURE — 80061 LIPID PANEL: CPT

## 2019-05-01 PROCEDURE — 36415 COLL VENOUS BLD VENIPUNCTURE: CPT | Mod: PO

## 2019-05-08 DIAGNOSIS — K21.9 GERD WITHOUT ESOPHAGITIS: ICD-10-CM

## 2019-05-08 RX ORDER — FAMOTIDINE 40 MG/1
TABLET, FILM COATED ORAL
Qty: 90 TABLET | Refills: 1 | Status: SHIPPED | OUTPATIENT
Start: 2019-05-08 | End: 2019-10-03

## 2019-05-13 ENCOUNTER — TELEPHONE (OUTPATIENT)
Dept: FAMILY MEDICINE | Facility: CLINIC | Age: 59
End: 2019-05-13

## 2019-05-13 DIAGNOSIS — I10 ESSENTIAL (PRIMARY) HYPERTENSION: Chronic | ICD-10-CM

## 2019-05-13 NOTE — TELEPHONE ENCOUNTER
"Her cholesterol has slightly improved and she does not meet criteria for starting a cholesterol medication at this time.  The rest of the labs looked good as well.      I would recommend that she follow up in 6-12 months (up to her preference) with labs prior.  Please create recall as a "physical"    Thank you,  Arik  "

## 2019-05-13 NOTE — TELEPHONE ENCOUNTER
Called patient to reschedule due to provider call out this morning. Patient states she was coming in to discuss lab results and  asks if labs will lead her to being on cholesterol medication.

## 2019-05-13 NOTE — TELEPHONE ENCOUNTER
Wireless customer not available.    Spoke with patient earlier to reschedule appt.   Patient requested  know if she will need cholesterol medication.

## 2019-05-22 ENCOUNTER — OFFICE VISIT (OUTPATIENT)
Dept: FAMILY MEDICINE | Facility: CLINIC | Age: 59
End: 2019-05-22
Payer: COMMERCIAL

## 2019-05-22 ENCOUNTER — PATIENT MESSAGE (OUTPATIENT)
Dept: OTHER | Facility: OTHER | Age: 59
End: 2019-05-22

## 2019-05-22 VITALS
OXYGEN SATURATION: 98 % | BODY MASS INDEX: 30.06 KG/M2 | WEIGHT: 191.5 LBS | HEIGHT: 67 IN | DIASTOLIC BLOOD PRESSURE: 90 MMHG | SYSTOLIC BLOOD PRESSURE: 138 MMHG | HEART RATE: 93 BPM | TEMPERATURE: 98 F

## 2019-05-22 DIAGNOSIS — I10 ESSENTIAL (PRIMARY) HYPERTENSION: ICD-10-CM

## 2019-05-22 DIAGNOSIS — F51.04 PSYCHOPHYSIOLOGICAL INSOMNIA: Chronic | ICD-10-CM

## 2019-05-22 DIAGNOSIS — M54.50 CHRONIC BILATERAL LOW BACK PAIN WITHOUT SCIATICA: ICD-10-CM

## 2019-05-22 DIAGNOSIS — Z00.00 ROUTINE MEDICAL EXAM: Primary | ICD-10-CM

## 2019-05-22 DIAGNOSIS — G89.29 CHRONIC BILATERAL LOW BACK PAIN WITHOUT SCIATICA: ICD-10-CM

## 2019-05-22 DIAGNOSIS — E78.00 PURE HYPERCHOLESTEROLEMIA: Chronic | ICD-10-CM

## 2019-05-22 PROCEDURE — 99999 PR PBB SHADOW E&M-EST. PATIENT-LVL III: CPT | Mod: PBBFAC,,, | Performed by: INTERNAL MEDICINE

## 2019-05-22 PROCEDURE — 3075F PR MOST RECENT SYSTOLIC BLOOD PRESS GE 130-139MM HG: ICD-10-PCS | Mod: CPTII,S$GLB,, | Performed by: INTERNAL MEDICINE

## 2019-05-22 PROCEDURE — 3080F PR MOST RECENT DIASTOLIC BLOOD PRESSURE >= 90 MM HG: ICD-10-PCS | Mod: CPTII,S$GLB,, | Performed by: INTERNAL MEDICINE

## 2019-05-22 PROCEDURE — 3075F SYST BP GE 130 - 139MM HG: CPT | Mod: CPTII,S$GLB,, | Performed by: INTERNAL MEDICINE

## 2019-05-22 PROCEDURE — 3080F DIAST BP >= 90 MM HG: CPT | Mod: CPTII,S$GLB,, | Performed by: INTERNAL MEDICINE

## 2019-05-22 PROCEDURE — 99999 PR PBB SHADOW E&M-EST. PATIENT-LVL III: ICD-10-PCS | Mod: PBBFAC,,, | Performed by: INTERNAL MEDICINE

## 2019-05-22 PROCEDURE — 99396 PR PREVENTIVE VISIT,EST,40-64: ICD-10-PCS | Mod: 25,S$GLB,, | Performed by: INTERNAL MEDICINE

## 2019-05-22 PROCEDURE — 99396 PREV VISIT EST AGE 40-64: CPT | Mod: 25,S$GLB,, | Performed by: INTERNAL MEDICINE

## 2019-05-22 RX ORDER — HYDROXYZINE PAMOATE 25 MG/1
CAPSULE ORAL
Qty: 90 CAPSULE | Refills: 1 | Status: SHIPPED | OUTPATIENT
Start: 2019-05-22

## 2019-05-22 RX ORDER — LOSARTAN POTASSIUM 50 MG/1
50 TABLET ORAL DAILY
Qty: 90 TABLET | Refills: 3 | Status: SHIPPED | OUTPATIENT
Start: 2019-05-22 | End: 2020-07-12

## 2019-05-22 RX ORDER — MONTELUKAST SODIUM 10 MG/1
TABLET ORAL
Refills: 5 | COMMUNITY
Start: 2019-05-08 | End: 2020-11-02

## 2019-05-22 RX ORDER — MELOXICAM 15 MG/1
15 TABLET ORAL DAILY PRN
Qty: 90 TABLET | Refills: 0 | Status: SHIPPED | OUTPATIENT
Start: 2019-05-22 | End: 2020-06-03

## 2019-05-22 RX ORDER — AMLODIPINE BESYLATE 10 MG/1
10 TABLET ORAL DAILY
Qty: 90 TABLET | Refills: 3 | Status: SHIPPED | OUTPATIENT
Start: 2019-05-22 | End: 2020-07-12

## 2019-05-22 NOTE — ASSESSMENT & PLAN NOTE
Only on amlodipine at this time.  Reduce back to 10mg.  Restart losartan 50mg.  Has digital HTN monitor.  WIll sign her back up to see fi she needs lower dose of losartan.
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
## 2019-06-26 ENCOUNTER — PATIENT OUTREACH (OUTPATIENT)
Dept: OTHER | Facility: OTHER | Age: 59
End: 2019-06-26

## 2019-06-26 NOTE — LETTER
November 7, 2019     Kendra STEPHEN [id number]       Dear Kendra,    We have made several attempts to encourage your participation in [facility]s Digital [facility] Program. Unfortunately, we have been unsuccessful.     This is an official notice that you are no longer enrolled in the digital medicine program, and thus, we will no longer be managing your disease states. Please note this has no impact on your relationship with [facility] or your providers. Going forward, please reach out to your primary care provider with any questions or concerns regarding your health.    Please contact [phone] if you have any additional questions.    Sincerely,  The [facility] Team

## 2019-06-26 NOTE — PROGRESS NOTES
1st attempt to complete enrollment call. No answer. Mailbox not setup, unable to leave voicemail.       Last 5 Patient Entered Readings                                      Current 30 Day Average: 132/91     Recent Readings 6/4/2019 5/30/2019 5/22/2019 5/22/2019 6/14/2017    SBP (mmHg) 141 123 125 139 132    DBP (mmHg) 95 86 90 92 70    Pulse 75 75 84 88 74

## 2019-06-26 NOTE — LETTER
October 15, 2019     Kendra Griffin  [address] [place] [address] [address]imena STEPHEN [id number]       Dear Kendra,    Thank you for your interest in [facility], a clinically-proven program designed to help you manage your chronic condition more conveniently and effectively. [facility] gives you:   Technology that lets you monitor your health at home and send readings directly to your care team at [facility]   Medications managed by a dedicated pharmacist or clinician    A  who calls and helps you take manageable steps towards a healthier lifestyle       We have tried to reach you via phone and [facility] Message to complete your enrollment in the Digital Medicine program. Unfortunately, weve been unable to reach you.     Please call us to complete your enrollment. Our number is [phone]. If we dont hear from you by 11/5/2019, we will be unable to complete your enrollment at this time.     We look forward to hearing from you soon.    Sincerely,     The Digital Medicine Team

## 2019-06-26 NOTE — LETTER
November 7, 2019     Kendra STEPHEN [id number]       Dear Kendra,    We have made several attempts to encourage your participation in [facility]s [facility] Program. Unfortunately, we have been unsuccessful.     This is an official notice that you are no longer enrolled in the digital medicine program, and thus, we will no longer be managing your disease states. Please note this has no impact on your relationship with [facility] or your providers. Going forward, please reach out to your primary care provider with any questions or concerns regarding your health.    Please contact [phone] if you have any additional questions.    Sincerely,  The [facility] Team

## 2019-06-26 NOTE — LETTER
October 15, 2019     Kendra Griffin  [address] [place] [address] [address] Ximena STEPHEN [id number]       Dear Kendra,    Thank you for your interest in [facility], a clinically-proven program designed to help you manage your chronic condition more conveniently and effectively. [facility] gives you:   Technology that lets you monitor your health at home and send readings directly to your care team at [facility]   Medications managed by a dedicated pharmacist or clinician    A  who calls and helps you take manageable steps towards a healthier lifestyle       We have tried to reach you via phone and [facility] Message to complete your enrollment in the Digital Medicine program. Unfortunately, weve been unable to reach you.     Please call us to complete your enrollment. Our number is [phone]. If we dont hear from you by 11/5/2019, we will be unable to complete your enrollment at this time.     We look forward to hearing from you soon.    Sincerely,     The Digital Medicine Team

## 2019-07-10 NOTE — PROGRESS NOTES
3rd attempt for enrollment call.  No answer, left voicemail.        Last 5 Patient Entered Readings                                      Current 30 Day Average: 140/80     Recent Readings 7/1/2019 6/4/2019 5/30/2019 5/22/2019 5/22/2019    SBP (mmHg) 140 141 123 125 139    DBP (mmHg) 80 95 86 90 92    Pulse 83 75 75 84 88

## 2019-07-18 NOTE — PROGRESS NOTES
4th attempt for enrollment call.  No answer, left voicemail.      Last 5 Patient Entered Readings                                      Current 30 Day Average: 140/80     Recent Readings 7/1/2019 6/4/2019 5/30/2019 5/22/2019 5/22/2019    SBP (mmHg) 140 141 123 125 139    DBP (mmHg) 80 95 86 90 92    Pulse 83 75 75 84 88

## 2019-07-26 ENCOUNTER — OFFICE VISIT (OUTPATIENT)
Dept: URGENT CARE | Facility: CLINIC | Age: 59
End: 2019-07-26
Payer: COMMERCIAL

## 2019-07-26 VITALS
HEART RATE: 71 BPM | TEMPERATURE: 99 F | SYSTOLIC BLOOD PRESSURE: 147 MMHG | WEIGHT: 191 LBS | DIASTOLIC BLOOD PRESSURE: 94 MMHG | BODY MASS INDEX: 29.98 KG/M2 | HEIGHT: 67 IN | OXYGEN SATURATION: 98 % | RESPIRATION RATE: 17 BRPM

## 2019-07-26 DIAGNOSIS — M79.641 PAIN OF RIGHT HAND: Primary | ICD-10-CM

## 2019-07-26 DIAGNOSIS — R03.0 ELEVATED BLOOD PRESSURE READING: ICD-10-CM

## 2019-07-26 PROCEDURE — 3077F SYST BP >= 140 MM HG: CPT | Mod: CPTII,S$GLB,, | Performed by: NURSE PRACTITIONER

## 2019-07-26 PROCEDURE — 3077F PR MOST RECENT SYSTOLIC BLOOD PRESSURE >= 140 MM HG: ICD-10-PCS | Mod: CPTII,S$GLB,, | Performed by: NURSE PRACTITIONER

## 2019-07-26 PROCEDURE — 3008F BODY MASS INDEX DOCD: CPT | Mod: CPTII,S$GLB,, | Performed by: NURSE PRACTITIONER

## 2019-07-26 PROCEDURE — 3008F PR BODY MASS INDEX (BMI) DOCUMENTED: ICD-10-PCS | Mod: CPTII,S$GLB,, | Performed by: NURSE PRACTITIONER

## 2019-07-26 PROCEDURE — 96372 PR INJECTION,THERAP/PROPH/DIAG2ST, IM OR SUBCUT: ICD-10-PCS | Mod: S$GLB,,, | Performed by: FAMILY MEDICINE

## 2019-07-26 PROCEDURE — 96372 THER/PROPH/DIAG INJ SC/IM: CPT | Mod: S$GLB,,, | Performed by: FAMILY MEDICINE

## 2019-07-26 PROCEDURE — 99214 OFFICE O/P EST MOD 30 MIN: CPT | Mod: 25,S$GLB,, | Performed by: NURSE PRACTITIONER

## 2019-07-26 PROCEDURE — 99214 PR OFFICE/OUTPT VISIT, EST, LEVL IV, 30-39 MIN: ICD-10-PCS | Mod: 25,S$GLB,, | Performed by: NURSE PRACTITIONER

## 2019-07-26 PROCEDURE — 3080F PR MOST RECENT DIASTOLIC BLOOD PRESSURE >= 90 MM HG: ICD-10-PCS | Mod: CPTII,S$GLB,, | Performed by: NURSE PRACTITIONER

## 2019-07-26 PROCEDURE — 3080F DIAST BP >= 90 MM HG: CPT | Mod: CPTII,S$GLB,, | Performed by: NURSE PRACTITIONER

## 2019-07-26 RX ORDER — NAPROXEN 500 MG/1
500 TABLET ORAL 2 TIMES DAILY WITH MEALS
Qty: 20 TABLET | Refills: 0 | Status: SHIPPED | OUTPATIENT
Start: 2019-07-26 | End: 2019-08-05

## 2019-07-26 RX ORDER — KETOROLAC TROMETHAMINE 30 MG/ML
30 INJECTION, SOLUTION INTRAMUSCULAR; INTRAVENOUS
Status: COMPLETED | OUTPATIENT
Start: 2019-07-26 | End: 2019-07-26

## 2019-07-26 RX ADMIN — KETOROLAC TROMETHAMINE 30 MG: 30 INJECTION, SOLUTION INTRAMUSCULAR; INTRAVENOUS at 09:07

## 2019-07-26 NOTE — PROGRESS NOTES
5th attempt for enrollment call.  No answer, left voicemail.      Last 5 Patient Entered Readings                                      Current 30 Day Average: 140/80     Recent Readings 7/1/2019 6/4/2019 5/30/2019 5/22/2019 5/22/2019    SBP (mmHg) 140 141 123 125 139    DBP (mmHg) 80 95 86 90 92    Pulse 83 75 75 84 88

## 2019-07-26 NOTE — PATIENT INSTRUCTIONS
Please follow up with your Primary care provider within 2-5 days if your signs and symptoms have not resolved or worsen.     If your condition worsens or fails to improve we recommend that you receive another evaluation at the emergency room immediately or contact your primary medical clinic to discuss your concerns.    You must understand that you have received an Urgent Care treatment only and that you may be released before all of your medical problems are known or treated.   You, the patient, will arrange for follow up care as instructed.     Elevated Blood Pressure    Your blood pressure was elevated during your visit to the urgent care.     It was not so high that immediate care was needed but it is recommended that you monitor your blood pressure over the next week or two to make sure that it is not staying elevated.      Please have your blood pressure taken 2-3 times daily at different times of the day.  Write all of those blood pressures down and record the time that they were taken.     Keep all that information and take it with you to see your Primary Care Physician.     If you are taking antihypertensives, please continue your medication regularly as prescribed.      If your blood pressure is consistently above 140/90 you will need to follow up with your PCP more quickly.     - According to ACEP guidelines, workup and treatment of hypertension in asymptomatic patient is not warranted in the ED:    (1) Initiating treatment for asymptomatic hypertension in the ED is not necessary when patients have follow-up.    (2) Rapidly lowering blood pressure in asymptomatic patients in the ED is unnecessary and may be harmful in some patients.    (3) When ED treatment for asymptomatic hypertension is initiated, blood pressure management should attempt to gradually lower blood pressure and should not be expected to be normalized during the initial ED visit.        Call your doctor immediately or seek emergency care  if you have any of the following:  · Chest pain or shortness of breath (call 911)  · Moderate to severe headache  · Weakness in the muscles of your face, arms, or legs  · Trouble speaking  · Extreme drowsiness  · Confusion  · Fainting or dizziness  · Pulsating or rushing sound in your ears  · Unexplained nosebleed  · Weakness, tingling, or numbness of your face, arms, or legs  · Change in vision  · Blood pressure measured at home that is greater than 180/110     Please consider DASH diet program ([employer] [place] [employer] Web site) for patients with hypertension as it is the only diet approved to improve blood pressures:     https://www.nhlbi.nih.gov/health-topics/dash-eating-plan    ORTHO    R.I.C.E. to the affected joint or limb as needed:    Rest- the injured or sore extremity.  Ice- for the next 24-48 hours, alternating 20 minutes on and 20 minutes off as needed up to 3 times a day.   Compression-Use bandages to stabilize injured limb.  Check circulation to make sure  bandage is not too tight.    Elevate-when possible to reduce swelling.      Ice 20 minutes on and 20 minutes off as needed for pain.     Please drink plenty of fluids.    Please get plenty of rest.    Please return here or go to the Emergency Department for any concerns or worsening of condition.    Please be aware that narcotics can be addictive. If I gave you narcotics, I have given you a limited quantity to take as it is needed at this time. However take it sparingly and only when needed.  Do not operate machinery or drive on this medication.      If you were not prescribed an anti-inflammatory medication, and if you do not have any history of stomach/intestinal ulcers, or kidney disease, or are not taking a blood thinner such as Coumadin, Plavix, Pradaxa, Eloquis, or Xaralta for example, it is OK to take over the counter Ibuprofen or Advil or Motrin or Aleve as directed.  Do not take these medications on an empty stomach.    If you were  given a splint wear it at all times unless otherwise instructed.     If you were given crutches use them as we instructed. Do not rest your armpits on the foam pad or you risk compressing the nerves and the vessels there.    Please follow up with your primary care doctor or specialist as needed.    If you lose control of your bowel and/or bladder, please go to the nearest Emergency Department immediately.  If you lose sensation in between your legs by your genitalia and/or rectum, please go to the nearest Emergency Department immediately.  If you lose control or sensation of any extremity, please go to the nearest Emergency Department immediately.      Arthralgia    Arthralgia is the term for pain in or around the joint. It is a symptom, not a disease. This pain may involve one or more joints. In some cases, the pain moves from joint to joint.  There are many causes for joint pain. These include:  · Injury  · Osteoarthritis (wearing out of the joint surface)  · Gout (inflammation of the joint due to crystals in the joint fluid)  · Infection inside the joint    · Bursitis (inflammation of the fluid-filled sacs around the joint)  · Autoimmune disorders such as rheumatoid arthritis or lupus  · Tendonitis (inflammation of chords that attach muscle to bone)  Home care  · Rest the involved joint(s) until your symptoms improve.   · You may be prescribed pain medicine. If none is prescribed, you may use acetaminophen or ibuprofen to control pain and inflammation.  Follow-up care  Follow up with your healthcare provider or as advised.  When to seek medical advice  Contact your healthcare provider right away if any of the following occurs:  · Pain, swelling, or redness of joint increases  · Pain worsens or recurs after a period of improvement  · Pain moves to other joints  · You cannot bear weight on the affected joint   · You cannot move the affected joint  · Joint appears deformed  · New rash appears  · Fever of 100.4ºF  (38ºC) or higher, or as directed by your healthcare provider  Date Last Reviewed: 3/1/2017  © 0176-9096 [employer]. [address] [place] [address], [place] [address]. All rights reserved. This information is not intended as a substitute for professional medical care. Always follow your healthcare professional's instructions.

## 2019-07-26 NOTE — PROGRESS NOTES
"Subjective:       Patient ID: Kendra Griffin is a 58 y.o. female.    Vitals:  height is 5' 7" (1.702 m) and weight is 86.6 kg (191 lb). Her temperature is 98.6 °F (37 °C). Her blood pressure is 147/94 (abnormal) and her pulse is 71. Her respiration is 17 and oxygen saturation is 98%.     Chief Complaint: Edema    States the edema to right hand started yesterday.  Currently on  Clindamycin, started it Monday 7/22 for an abscess in her jaw.  Taking it 4 times daily x 10 days.        Edema   This is a new problem. Episode onset: 4 days. The problem occurs constantly. The problem has been gradually worsening. Associated symptoms include joint swelling and numbness (Rt middle finger). Pertinent negatives include no arthralgias, chest pain, chills, congestion, coughing, fatigue, fever, headaches, myalgias, nausea, rash, sore throat, vertigo, vomiting or weakness. She has tried ice (warm compress ) for the symptoms. The treatment provided no relief.       Constitution: Negative for chills, fatigue and fever.   HENT: Negative for congestion and sore throat.    Neck: Negative for painful lymph nodes.   Cardiovascular: Negative for chest pain and leg swelling.   Eyes: Negative for double vision and blurred vision.   Respiratory: Negative for cough and shortness of breath.    Gastrointestinal: Negative for nausea, vomiting and diarrhea.   Genitourinary: Negative for dysuria, frequency, urgency and history of kidney stones.   Musculoskeletal: Positive for pain (Rt hand ) and joint swelling. Negative for joint pain, muscle cramps and muscle ache.   Skin: Positive for erythema. Negative for color change, pale, rash and bruising.        Edema of Rt hand, states edema is moving up to forearm   Allergic/Immunologic: Negative for seasonal allergies.   Neurological: Positive for numbness (Rt middle finger) and tingling. Negative for dizziness, history of vertigo, light-headedness, passing out and headaches.   Hematologic/Lymphatic: " Negative for swollen lymph nodes.   Psychiatric/Behavioral: Negative for nervous/anxious, sleep disturbance and depression. The patient is not nervous/anxious.        Objective:      Physical Exam   Constitutional: She is oriented to person, place, and time. Vital signs are normal. She appears well-developed and well-nourished. She is active and cooperative. No distress.   HENT:   Head: Normocephalic and atraumatic.   Nose: Nose normal.   Mouth/Throat: Oropharynx is clear and moist and mucous membranes are normal.   Eyes: Conjunctivae and lids are normal.   Neck: Trachea normal, normal range of motion, full passive range of motion without pain and phonation normal. Neck supple.   Cardiovascular: Normal rate, regular rhythm, normal heart sounds, intact distal pulses and normal pulses.   Pulmonary/Chest: Effort normal and breath sounds normal.   Abdominal: Soft. Normal appearance and bowel sounds are normal. She exhibits no abdominal bruit, no pulsatile midline mass and no mass.   Musculoskeletal: She exhibits no edema or deformity.        Right hand: She exhibits decreased range of motion, tenderness (TTP, see diagram) and swelling (mild with some erythema). She exhibits no bony tenderness, normal two-point discrimination, normal capillary refill, no deformity and no laceration.        Hands:  Neurological: She is alert and oriented to person, place, and time. She has normal strength and normal reflexes. No sensory deficit.   Skin: Skin is warm, dry and intact. She is not diaphoretic. There is erythema. No cyanosis. No pallor. Nails show no clubbing.   Psychiatric: She has a normal mood and affect. Her speech is normal and behavior is normal. Judgment and thought content normal. Cognition and memory are normal.   Nursing note and vitals reviewed.      Assessment:       1. Pain of right hand    2. Elevated blood pressure reading        Plan:       At this time continue the clindamycin.  Will call on Sunday to check  for improvement of symptoms.      Pain of right hand  -     ketorolac injection 30 mg  -     naproxen (NAPROSYN) 500 MG tablet; Take 1 tablet (500 mg total) by mouth 2 (two) times daily with meals. As needed for pain. for 10 days  Dispense: 20 tablet; Refill: 0    Elevated blood pressure reading      Patient Instructions     Please follow up with your Primary care provider within 2-5 days if your signs and symptoms have not resolved or worsen.     If your condition worsens or fails to improve we recommend that you receive another evaluation at the emergency room immediately or contact your primary medical clinic to discuss your concerns.    You must understand that you have received an Urgent Care treatment only and that you may be released before all of your medical problems are known or treated.   You, the patient, will arrange for follow up care as instructed.     Elevated Blood Pressure    Your blood pressure was elevated during your visit to the urgent care.     It was not so high that immediate care was needed but it is recommended that you monitor your blood pressure over the next week or two to make sure that it is not staying elevated.      Please have your blood pressure taken 2-3 times daily at different times of the day.  Write all of those blood pressures down and record the time that they were taken.     Keep all that information and take it with you to see your Primary Care Physician.     If you are taking antihypertensives, please continue your medication regularly as prescribed.      If your blood pressure is consistently above 140/90 you will need to follow up with your PCP more quickly.     - According to ACEP guidelines, workup and treatment of hypertension in asymptomatic patient is not warranted in the ED:    (1) Initiating treatment for asymptomatic hypertension in the ED is not necessary when patients have follow-up.    (2) Rapidly lowering blood pressure in asymptomatic patients in the ED is  unnecessary and may be harmful in some patients.    (3) When ED treatment for asymptomatic hypertension is initiated, blood pressure management should attempt to gradually lower blood pressure and should not be expected to be normalized during the initial ED visit.        Call your doctor immediately or seek emergency care if you have any of the following:  · Chest pain or shortness of breath (call 911)  · Moderate to severe headache  · Weakness in the muscles of your face, arms, or legs  · Trouble speaking  · Extreme drowsiness  · Confusion  · Fainting or dizziness  · Pulsating or rushing sound in your ears  · Unexplained nosebleed  · Weakness, tingling, or numbness of your face, arms, or legs  · Change in vision  · Blood pressure measured at home that is greater than 180/110     Please consider DASH diet program (National [place] [employer] Health Web site) for patients with hypertension as it is the only diet approved to improve blood pressures:     https://www.nhlbi.nih.gov/health-topics/dash-eating-plan    ORTHO    R.I.C.E. to the affected joint or limb as needed:    Rest- the injured or sore extremity.  Ice- for the next 24-48 hours, alternating 20 minutes on and 20 minutes off as needed up to 3 times a day.   Compression-Use bandages to stabilize injured limb.  Check circulation to make sure  bandage is not too tight.    Elevate-when possible to reduce swelling.      Ice 20 minutes on and 20 minutes off as needed for pain.     Please drink plenty of fluids.    Please get plenty of rest.    Please return here or go to the Emergency Department for any concerns or worsening of condition.    Please be aware that narcotics can be addictive. If I gave you narcotics, I have given you a limited quantity to take as it is needed at this time. However take it sparingly and only when needed.  Do not operate machinery or drive on this medication.      If you were not prescribed an anti-inflammatory medication, and if you do not  have any history of stomach/intestinal ulcers, or kidney disease, or are not taking a blood thinner such as Coumadin, Plavix, Pradaxa, Eloquis, or Xaralta for example, it is OK to take over the counter Ibuprofen or Advil or Motrin or Aleve as directed.  Do not take these medications on an empty stomach.    If you were given a splint wear it at all times unless otherwise instructed.     If you were given crutches use them as we instructed. Do not rest your armpits on the foam pad or you risk compressing the nerves and the vessels there.    Please follow up with your primary care doctor or specialist as needed.    If you lose control of your bowel and/or bladder, please go to the nearest Emergency Department immediately.  If you lose sensation in between your legs by your genitalia and/or rectum, please go to the nearest Emergency Department immediately.  If you lose control or sensation of any extremity, please go to the nearest Emergency Department immediately.      Arthralgia    Arthralgia is the term for pain in or around the joint. It is a symptom, not a disease. This pain may involve one or more joints. In some cases, the pain moves from joint to joint.  There are many causes for joint pain. These include:  · Injury  · Osteoarthritis (wearing out of the joint surface)  · Gout (inflammation of the joint due to crystals in the joint fluid)  · Infection inside the joint    · Bursitis (inflammation of the fluid-filled sacs around the joint)  · Autoimmune disorders such as rheumatoid arthritis or lupus  · Tendonitis (inflammation of chords that attach muscle to bone)  Home care  · Rest the involved joint(s) until your symptoms improve.   · You may be prescribed pain medicine. If none is prescribed, you may use acetaminophen or ibuprofen to control pain and inflammation.  Follow-up care  Follow up with your healthcare provider or as advised.  When to seek medical advice  Contact your healthcare provider right away if  any of the following occurs:  · Pain, swelling, or redness of joint increases  · Pain worsens or recurs after a period of improvement  · Pain moves to other joints  · You cannot bear weight on the affected joint   · You cannot move the affected joint  · Joint appears deformed  · New rash appears  · Fever of 100.4ºF (38ºC) or higher, or as directed by your healthcare provider  Date Last Reviewed: 3/1/2017  © 5221-3518 [employer]. [address] [place] [address]. All rights reserved. This information is not intended as a substitute for professional medical care. Always follow your healthcare professional's instructions.

## 2019-07-26 NOTE — LETTER
July 26, 2019      [facility]  [address] [address], [address]  [place] [address]  Phone: [phone]  Fax: [phone]       Patient: Kendra Griffin   YOB: 1960  Date of Visit: 07/26/2019    To Whom It May Concern:    Tyler Griffin  was at [facility] on 07/26/2019. She may return to work/school on 7/28/2019 with no restrictions. If you have any questions or concerns, or if I can be of further assistance, please do not hesitate to contact me.    Sincerely,      Cheri Bull NP

## 2019-07-29 ENCOUNTER — TELEPHONE (OUTPATIENT)
Dept: URGENT CARE | Facility: CLINIC | Age: 59
End: 2019-07-29

## 2019-08-09 ENCOUNTER — OFFICE VISIT (OUTPATIENT)
Dept: URGENT CARE | Facility: CLINIC | Age: 59
End: 2019-08-09
Payer: COMMERCIAL

## 2019-08-09 VITALS
WEIGHT: 191 LBS | RESPIRATION RATE: 18 BRPM | DIASTOLIC BLOOD PRESSURE: 87 MMHG | HEIGHT: 67 IN | BODY MASS INDEX: 29.98 KG/M2 | OXYGEN SATURATION: 97 % | HEART RATE: 72 BPM | TEMPERATURE: 99 F | SYSTOLIC BLOOD PRESSURE: 132 MMHG

## 2019-08-09 DIAGNOSIS — S46.911A MUSCLE STRAIN OF RIGHT UPPER ARM, INITIAL ENCOUNTER: Primary | ICD-10-CM

## 2019-08-09 PROCEDURE — 99214 OFFICE O/P EST MOD 30 MIN: CPT | Mod: S$GLB,,, | Performed by: NURSE PRACTITIONER

## 2019-08-09 PROCEDURE — 99214 PR OFFICE/OUTPT VISIT, EST, LEVL IV, 30-39 MIN: ICD-10-PCS | Mod: S$GLB,,, | Performed by: NURSE PRACTITIONER

## 2019-08-09 PROCEDURE — 73060 X-RAY EXAM OF HUMERUS: CPT | Mod: FY,RT,S$GLB, | Performed by: RADIOLOGY

## 2019-08-09 PROCEDURE — 73060 XR HUMERUS 2 VIEW RIGHT: ICD-10-PCS | Mod: FY,RT,S$GLB, | Performed by: RADIOLOGY

## 2019-08-09 NOTE — PATIENT INSTRUCTIONS
IF FOLLOW UP IS NEEDED, YOU MUST:     Follow up through [facility] or [facility]:     [facility]     [address] [facility]   [address] [facility].  [address]  [phone]  Hours: 8:00 A.M- 4:30 P.M.  Walk-ins 8:30 A.M- 5:00 P.M..       ORTHO    R.I.C.E. to the affected joint or limb as needed:    Rest- the injured or sore extremity.  Ice- for the next 24-48 hours, alternating 20 minutes on and 20 minutes off as needed up to 3 times a day.   Compression-Use bandages to stabilize injured limb.  Check circulation to make sure  bandage is not too tight.    Elevate-when possible to reduce swelling.      Ice 20 minutes on and 20 minutes off as needed for pain.     Please drink plenty of fluids.    Please get plenty of rest.    Please return here or go to the Emergency Department for any concerns or worsening of condition.    Please be aware that narcotics can be addictive. If I gave you narcotics, I have given you a limited quantity to take as it is needed at this time. However take it sparingly and only when needed.  Do not operate machinery or drive on this medication.      If you were not prescribed an anti-inflammatory medication, and if you do not have any history of stomach/intestinal ulcers, or kidney disease, or are not taking a blood thinner such as Coumadin, Plavix, Pradaxa, Eloquis, or Xaralta for example, it is OK to take over the counter Ibuprofen or Advil or Motrin or Aleve as directed.  Do not take these medications on an empty stomach.    If you were given a splint wear it at all times unless otherwise instructed.     If you were given crutches use them as we instructed. Do not rest your armpits on the foam pad or you risk compressing the nerves and the vessels there.    Please follow up with your primary care doctor or specialist as needed.    If you lose control of your bowel and/or bladder, please go to the nearest  Emergency Department immediately.  If you lose sensation in between your legs by your genitalia and/or rectum, please go to the nearest Emergency Department immediately.  If you lose control or sensation of any extremity, please go to the nearest Emergency Department immediately.      Muscle Strain in the Extremities  A muscle strain is a stretching and tearing of muscle fibers. This causes pain, especially when you move that muscle. There may also be some swelling and bruising.  Home care  · Keep the hurt area raised to reduce pain and swelling. This is especially important during the first 48 hours.  · Apply an ice pack over the injured area for 15 to 20 minutes every 3 to 6 hours. You should do this for the first 24 to 48 hours. You can make an ice pack by filling a plastic bag that seals at the top with ice cubes and then wrapping it with a thin towel. Be careful not to injure your skin with the ice treatments. Ice should never be applied directly to skin. Continue the use of ice packs for relief of pain and swelling as needed. After 48 hours, apply heat (warm shower or warm bath) for 15 to 20 minutes several times a day, or alternate ice and heat.  · You may use over-the-counter pain medicine to control pain, unless another medicine was prescribed. If you have chronic liver or kidney disease or ever had a stomach ulcer or GI bleeding, talk with your healthcare provider before using these medicines.  · For leg strains: If crutches have been recommended, dont put full weight on the hurt leg until you can do so without pain. You can return to sports when you are able to hop and run on the injured leg without pain.  Follow-up care  Follow up with your healthcare provider, or as advised.  When to seek medical advice  Call your healthcare provider right away if any of these occur:  · The toes of the injured leg become swollen, cold, blue, numb, or tingly  · Pain or swelling increases  Date Last Reviewed:  11/19/2015  © 1772-7234 [employer]. [address] [place] [address], [place] [address]. All rights reserved. This information is not intended as a substitute for professional medical care. Always follow your healthcare professional's instructions.

## 2019-08-09 NOTE — PROGRESS NOTES
"Subjective:       Patient ID: Kendra Griffin is a 58 y.o. female.    Vitals:  height is 5' 7" (1.702 m) and weight is 86.6 kg (191 lb). Her temperature is 98.6 °F (37 °C). Her blood pressure is 132/87 and her pulse is 72. Her respiration is 18 and oxygen saturation is 97%.     Chief Complaint: Arm Pain    This is a 58 y.o. female who presents today with a chief complaint of right arm and shoulder pain down to her elbow. Patient started with the pain 10 days ago while at work throwing away a wooden pallet.      Arm Pain    The incident occurred more than 1 week ago. The incident occurred at work. Injury mechanism: threw out a wooden pallet. The pain is present in the right shoulder and upper right arm. The quality of the pain is described as aching. The pain radiates to the right arm. The pain is at a severity of 5/10. The pain has been constant since the incident. Pertinent negatives include no chest pain. She has tried acetaminophen and NSAIDs for the symptoms. The treatment provided no relief.       Constitution: Negative for chills, fatigue and fever.   HENT: Negative for congestion and sore throat.    Neck: Negative for painful lymph nodes.   Cardiovascular: Negative for chest pain and leg swelling.   Eyes: Negative for double vision and blurred vision.   Respiratory: Negative for cough and shortness of breath.    Gastrointestinal: Negative for nausea, vomiting and diarrhea.   Genitourinary: Negative for dysuria, frequency, urgency and history of kidney stones.   Musculoskeletal: Positive for pain and muscle ache. Negative for joint pain, joint swelling and muscle cramps.   Skin: Negative for color change, pale, rash and bruising.   Allergic/Immunologic: Negative for seasonal allergies.   Neurological: Negative for dizziness, history of vertigo, light-headedness, passing out and headaches.   Hematologic/Lymphatic: Negative for swollen lymph nodes.   Psychiatric/Behavioral: Negative for nervous/anxious, sleep " disturbance and depression. The patient is not nervous/anxious.        Objective:      Physical Exam   Constitutional: She is oriented to person, place, and time. Vital signs are normal. She appears well-developed and well-nourished. She is active and cooperative. No distress.   HENT:   Head: Normocephalic and atraumatic.   Nose: Nose normal.   Eyes: Conjunctivae and lids are normal.   Neck: Trachea normal, normal range of motion, full passive range of motion without pain and phonation normal. Neck supple.   Cardiovascular: Normal rate, regular rhythm, normal heart sounds, intact distal pulses and normal pulses.   Pulmonary/Chest: Effort normal and breath sounds normal.   Abdominal: Soft. Normal appearance and bowel sounds are normal. She exhibits no abdominal bruit, no pulsatile midline mass and no mass.   Musculoskeletal: She exhibits no edema or deformity.        Right shoulder: She exhibits decreased range of motion. She exhibits no tenderness, no bony tenderness, no swelling, no effusion, no crepitus, no deformity, no laceration, normal pulse and normal strength.        Right elbow: Normal.       Right upper arm: She exhibits tenderness (TTP, see diagram). She exhibits no bony tenderness, no swelling, no edema, no deformity and no laceration.        Right forearm: Normal.        Arms:  Neurological: She is alert and oriented to person, place, and time. She has normal strength and normal reflexes. No sensory deficit.   Skin: Skin is warm, dry and intact. She is not diaphoretic.   Psychiatric: She has a normal mood and affect. Her speech is normal and behavior is normal. Judgment and thought content normal. Cognition and memory are normal.   Nursing note and vitals reviewed.      Assessment:       1. Muscle strain of right upper arm, initial encounter        Plan:       X-ray Humerus 2 View Right    Result Date: 8/9/2019  EXAMINATION: XR HUMERUS 2 VIEW RIGHT CLINICAL HISTORY: Pain in right arm COMPARISON: None  FINDINGS: Two views. Degenerative changes noted of the acromioclavicular joint.  The proximal humerus maintains appropriate relationship with the glenoid.  No acute displaced fracture or dislocation of the humerus.  The visualized right ribs are grossly intact.  The right lung zones are grossly clear.     1. No acute displaced fracture or dislocation of the right shoulder. Electronically signed by: Papa Hammer MD Date:    08/09/2019 Time:    16:30    Muscle strain of right upper arm, initial encounter  -     X-Ray Humerus 2 View Right; Future; Expected date: 08/09/2019      Patient Instructions   IF FOLLOW UP IS NEEDED, YOU MUST:     Follow up through [facility] or [facility]:     [facility]     [address] [facility]   [address] [facility].  [address]  [phone]  Hours: 8:00 A.M- 4:30 P.M.  Walk-ins 8:30 A.M- 5:00 P.M..       ORTHO    R.I.C.E. to the affected joint or limb as needed:    Rest- the injured or sore extremity.  Ice- for the next 24-48 hours, alternating 20 minutes on and 20 minutes off as needed up to 3 times a day.   Compression-Use bandages to stabilize injured limb.  Check circulation to make sure  bandage is not too tight.    Elevate-when possible to reduce swelling.      Ice 20 minutes on and 20 minutes off as needed for pain.     Please drink plenty of fluids.    Please get plenty of rest.    Please return here or go to the Emergency Department for any concerns or worsening of condition.    Please be aware that narcotics can be addictive. If I gave you narcotics, I have given you a limited quantity to take as it is needed at this time. However take it sparingly and only when needed.  Do not operate machinery or drive on this medication.      If you were not prescribed an anti-inflammatory medication, and if you do not have any history of stomach/intestinal ulcers, or kidney disease, or are not taking a  blood thinner such as Coumadin, Plavix, Pradaxa, Eloquis, or Xaralta for example, it is OK to take over the counter Ibuprofen or Advil or Motrin or Aleve as directed.  Do not take these medications on an empty stomach.    If you were given a splint wear it at all times unless otherwise instructed.     If you were given crutches use them as we instructed. Do not rest your armpits on the foam pad or you risk compressing the nerves and the vessels there.    Please follow up with your primary care doctor or specialist as needed.    If you lose control of your bowel and/or bladder, please go to the nearest Emergency Department immediately.  If you lose sensation in between your legs by your genitalia and/or rectum, please go to the nearest Emergency Department immediately.  If you lose control or sensation of any extremity, please go to the nearest Emergency Department immediately.      Muscle Strain in the Extremities  A muscle strain is a stretching and tearing of muscle fibers. This causes pain, especially when you move that muscle. There may also be some swelling and bruising.  Home care  · Keep the hurt area raised to reduce pain and swelling. This is especially important during the first 48 hours.  · Apply an ice pack over the injured area for 15 to 20 minutes every 3 to 6 hours. You should do this for the first 24 to 48 hours. You can make an ice pack by filling a plastic bag that seals at the top with ice cubes and then wrapping it with a thin towel. Be careful not to injure your skin with the ice treatments. Ice should never be applied directly to skin. Continue the use of ice packs for relief of pain and swelling as needed. After 48 hours, apply heat (warm shower or warm bath) for 15 to 20 minutes several times a day, or alternate ice and heat.  · You may use over-the-counter pain medicine to control pain, unless another medicine was prescribed. If you have chronic liver or kidney disease or ever had a stomach  ulcer or GI bleeding, talk with your healthcare provider before using these medicines.  · For leg strains: If crutches have been recommended, dont put full weight on the hurt leg until you can do so without pain. You can return to sports when you are able to hop and run on the injured leg without pain.  Follow-up care  Follow up with your healthcare provider, or as advised.  When to seek medical advice  Call your healthcare provider right away if any of these occur:  · The toes of the injured leg become swollen, cold, blue, numb, or tingly  · Pain or swelling increases  Date Last Reviewed: 11/19/2015  © 8181-0960 [employer]. [address] [place] [address], [place] [address]. All rights reserved. This information is not intended as a substitute for professional medical care. Always follow your healthcare professional's instructions.

## 2019-08-09 NOTE — LETTER
Work Status Summary - Page 1 of 2  ______________________________________________________________________    Date :  August 9, 2019 Carrier :    To :  Fax # :    ______________________________________________________________________    Patient Name: Kendra Griffin   YOB: 1960   Employer:    Occupation:    Date of Injury:    Diagnosis: Muscle strain   ______________________________________________________________________     [ x  ] ABLE to work (pre-injury work level / full duty)    [   ] NOT ABLE to work at present  Estimated release to return to work:      [   ] ABLE to work --- transitional duty (as follows):   [   ] Sedentary Work: Lifting 10 lbs. maximum and occasionally lifting and/or  carrying articles such as dockets, ledgers and small tools. Although a sedentary  job is defined as one which involves sitting, a certain amount of walking and  standing is often necessary in carrying out job duties. Jobs are sedentary if  walking and standing are required only occasionally and other sedentary criteria  are met.      [   ] Light Work: Lifting 20 lbs. maximum with frequent lifting and/or carrying of  objects weighing up to 10 lbs. Even though the weight lifted may be only a  negligible amount, a job is in this category when it requires walking or standing  to a significant degree, or when it involves sitting most of the time with a degree  of pushing and pulling of arm and/or leg controls.      [   ] Medium Work: Lifting 50 lbs. maximum with frequent lifting and/or carrying  of objects weighing up to 25 lbs.      [   ] Heavy Work: Lifting 100 lbs. maximum with frequent lifting and/or carrying  of objects weighing up to 50 lbs.      [   ] Very Heavy Work:  Lifting objects in excess of 100 lbs. with frequent lifting  and/or carrying of objects weighing 50 lbs or more.                   THERAPY RECOMMENDATIONS:   [   ] Physical Therapy     Visits per week:   Duration (in weeks):        [   ]  Occupational Therapy  Visits per week:   Duration (in weeks):        Recommended Follow Up:    Primary Care Physician in:   days General Surgeon in:   days   Orthopedist in:   days Ophthalmologist in:   days     Other:  (list other follow up recommendation here)   days     Prescribed Medications:  None     Comments:  RICE and OTC pain relievers as needed. Follow up with Employee health on Monday 8/12/2019.           ______________________________________________________________________  Cheri SHEPHERD-BC  8/9/2019  4:45PM  Provider Signature / Print Name / Date / Time       Work Status Summary - Page 2 of 2    Form No. 3291   (Rev 6/21/16)   [employer]

## 2019-08-12 ENCOUNTER — TELEPHONE (OUTPATIENT)
Dept: URGENT CARE | Facility: CLINIC | Age: 59
End: 2019-08-12

## 2019-08-27 ENCOUNTER — PATIENT MESSAGE (OUTPATIENT)
Dept: FAMILY MEDICINE | Facility: CLINIC | Age: 59
End: 2019-08-27

## 2019-08-28 NOTE — PROGRESS NOTES
Last 5 Patient Entered Readings                                      Current 30 Day Average:      Recent Readings 7/1/2019 6/4/2019 5/30/2019 5/22/2019 5/22/2019    SBP (mmHg) 140 141 123 125 139    DBP (mmHg) 80 95 86 90 92    Pulse 83 75 75 84 88          5th attempt to contact patient to complete enrollment call was unsuccessful; voice mailbox has not been set up yet and unable to leave voicemail at this time; call back scheduled in 3 weeks.

## 2019-08-29 ENCOUNTER — PATIENT MESSAGE (OUTPATIENT)
Dept: ADMINISTRATIVE | Facility: OTHER | Age: 59
End: 2019-08-29

## 2019-09-05 ENCOUNTER — OFFICE VISIT (OUTPATIENT)
Dept: FAMILY MEDICINE | Facility: CLINIC | Age: 59
End: 2019-09-05
Payer: COMMERCIAL

## 2019-09-05 ENCOUNTER — PATIENT MESSAGE (OUTPATIENT)
Dept: FAMILY MEDICINE | Facility: CLINIC | Age: 59
End: 2019-09-05

## 2019-09-05 ENCOUNTER — HOSPITAL ENCOUNTER (OUTPATIENT)
Dept: RADIOLOGY | Facility: HOSPITAL | Age: 59
Discharge: HOME | End: 2019-09-05
Attending: NURSE PRACTITIONER
Payer: COMMERCIAL

## 2019-09-05 VITALS
SYSTOLIC BLOOD PRESSURE: 127 MMHG | HEIGHT: 67 IN | WEIGHT: 192.13 LBS | OXYGEN SATURATION: 98 % | BODY MASS INDEX: 30.16 KG/M2 | DIASTOLIC BLOOD PRESSURE: 87 MMHG | HEART RATE: 81 BPM | TEMPERATURE: 99 F

## 2019-09-05 DIAGNOSIS — I10 ESSENTIAL (PRIMARY) HYPERTENSION: Chronic | ICD-10-CM

## 2019-09-05 DIAGNOSIS — M25.50 POLYARTHRALGIA: Primary | ICD-10-CM

## 2019-09-05 DIAGNOSIS — G89.29 CHRONIC PAIN OF LEFT KNEE: ICD-10-CM

## 2019-09-05 DIAGNOSIS — Z23 NEEDS FLU SHOT: ICD-10-CM

## 2019-09-05 DIAGNOSIS — M25.562 CHRONIC PAIN OF LEFT KNEE: ICD-10-CM

## 2019-09-05 DIAGNOSIS — R76.8 CYCLIC CITRULLINATED PEPTIDE (CCP) ANTIBODY POSITIVE: ICD-10-CM

## 2019-09-05 DIAGNOSIS — M25.50 POLYARTHRALGIA: ICD-10-CM

## 2019-09-05 DIAGNOSIS — M72.2 PLANTAR FASCIITIS, LEFT: ICD-10-CM

## 2019-09-05 DIAGNOSIS — E78.00 PURE HYPERCHOLESTEROLEMIA: Chronic | ICD-10-CM

## 2019-09-05 PROCEDURE — 73130 X-RAY EXAM OF HAND: CPT | Mod: 26,RT,, | Performed by: RADIOLOGY

## 2019-09-05 PROCEDURE — 3079F PR MOST RECENT DIASTOLIC BLOOD PRESSURE 80-89 MM HG: ICD-10-PCS | Mod: CPTII,S$GLB,, | Performed by: NURSE PRACTITIONER

## 2019-09-05 PROCEDURE — 99999 PR PBB SHADOW E&M-EST. PATIENT-LVL V: CPT | Mod: PBBFAC,,, | Performed by: NURSE PRACTITIONER

## 2019-09-05 PROCEDURE — 90686 FLU VACCINE (QUAD) GREATER THAN OR EQUAL TO 3YO PRESERVATIVE FREE IM: ICD-10-PCS | Mod: S$GLB,,, | Performed by: NURSE PRACTITIONER

## 2019-09-05 PROCEDURE — 73565 XR KNEE AP STANDING BILATERAL: ICD-10-PCS | Mod: 26,,, | Performed by: RADIOLOGY

## 2019-09-05 PROCEDURE — 3008F PR BODY MASS INDEX (BMI) DOCUMENTED: ICD-10-PCS | Mod: CPTII,S$GLB,, | Performed by: NURSE PRACTITIONER

## 2019-09-05 PROCEDURE — 73565 X-RAY EXAM OF KNEES: CPT | Mod: 26,,, | Performed by: RADIOLOGY

## 2019-09-05 PROCEDURE — 73130 PR  X-RAY HAND 3+ VW: ICD-10-PCS | Mod: 26,59,LT, | Performed by: RADIOLOGY

## 2019-09-05 PROCEDURE — 73130 X-RAY EXAM OF HAND: CPT | Mod: 26,59,LT, | Performed by: RADIOLOGY

## 2019-09-05 PROCEDURE — 3008F BODY MASS INDEX DOCD: CPT | Mod: CPTII,S$GLB,, | Performed by: NURSE PRACTITIONER

## 2019-09-05 PROCEDURE — 3079F DIAST BP 80-89 MM HG: CPT | Mod: CPTII,S$GLB,, | Performed by: NURSE PRACTITIONER

## 2019-09-05 PROCEDURE — 90471 FLU VACCINE (QUAD) GREATER THAN OR EQUAL TO 3YO PRESERVATIVE FREE IM: ICD-10-PCS | Mod: S$GLB,,, | Performed by: NURSE PRACTITIONER

## 2019-09-05 PROCEDURE — 3074F SYST BP LT 130 MM HG: CPT | Mod: CPTII,S$GLB,, | Performed by: NURSE PRACTITIONER

## 2019-09-05 PROCEDURE — 99214 PR OFFICE/OUTPT VISIT, EST, LEVL IV, 30-39 MIN: ICD-10-PCS | Mod: 25,S$GLB,, | Performed by: NURSE PRACTITIONER

## 2019-09-05 PROCEDURE — 99214 OFFICE O/P EST MOD 30 MIN: CPT | Mod: 25,S$GLB,, | Performed by: NURSE PRACTITIONER

## 2019-09-05 PROCEDURE — 90686 IIV4 VACC NO PRSV 0.5 ML IM: CPT | Mod: S$GLB,,, | Performed by: NURSE PRACTITIONER

## 2019-09-05 PROCEDURE — 73565 X-RAY EXAM OF KNEES: CPT | Mod: TC,FY,PO

## 2019-09-05 PROCEDURE — 3074F PR MOST RECENT SYSTOLIC BLOOD PRESSURE < 130 MM HG: ICD-10-PCS | Mod: CPTII,S$GLB,, | Performed by: NURSE PRACTITIONER

## 2019-09-05 PROCEDURE — 99999 PR PBB SHADOW E&M-EST. PATIENT-LVL V: ICD-10-PCS | Mod: PBBFAC,,, | Performed by: NURSE PRACTITIONER

## 2019-09-05 PROCEDURE — 73130 X-RAY EXAM OF HAND: CPT | Mod: 50,TC,FY,PO

## 2019-09-05 PROCEDURE — 90471 IMMUNIZATION ADMIN: CPT | Mod: S$GLB,,, | Performed by: NURSE PRACTITIONER

## 2019-09-05 RX ORDER — MELOXICAM 15 MG/1
15 TABLET ORAL DAILY
Qty: 30 TABLET | Refills: 2 | Status: SHIPPED | OUTPATIENT
Start: 2019-09-05 | End: 2020-06-03

## 2019-09-05 NOTE — PATIENT INSTRUCTIONS
Treating Plantar Fasciitis  First, your healthcare provider tries to determine the cause of your problem in order to suggest ways to relieve pain. If your pain is due to poor foot mechanics, custom-made shoe inserts (orthoses) may help.    Reduce symptoms  · To relieve mild symptoms, try aspirin, ibuprofen, or other medicines as directed. Rubbing ice on the affected area may also help.  · To reduce severe pain and swelling, your healthcare provider may prescribe pills or injections or a walking cast in some instances. Physical therapy, such as ultrasound or a daily stretching program, may also be recommended. Surgery is rarely required.  · To reduce symptoms caused by poor foot mechanics, your foot may be taped. This supports the arch and temporarily controls movement. Night splints may also help by stretching the fascia.  Control movement  If taping helps, your healthcare provider may prescribe orthoses. Built from plaster casts of your feet, these inserts control the way your foot moves. As a result, your symptoms should go away.  Reduce overuse  Every time your foot strikes the ground, the plantar fascia is stretched. You can reduce the strain on the plantar fascia and the possibility of overuse by following these suggestions:  · Lose any excess weight.  · Avoid running on hard or uneven ground.  · Use orthoses at all times in your shoes and house slippers.  If surgery is needed  Your healthcare provider may consider surgery if other types of treatment don't control your pain. During surgery, the plantar fascia is partially cut to release tension. As you heal, fibrous tissue fills the space between the heel bone and the plantar fascia.   Date Last Reviewed: 10/14/2015  © 3603-1902 [employer]. [address] [place] [address], [place] [address]. All rights reserved. This information is not intended as a substitute for professional medical care. Always follow your healthcare professional's  instructions.        Arthralgia    Arthralgia is the term for pain in or around the joint. It is a symptom, not a disease. This pain may involve one or more joints. In some cases, the pain moves from joint to joint.  There are many causes for joint pain. These include:  · Injury  · Osteoarthritis (wearing out of the joint surface)  · Gout (inflammation of the joint due to crystals in the joint fluid)  · Infection inside the joint    · Bursitis (inflammation of the fluid-filled sacs around the joint)  · Autoimmune disorders such as rheumatoid arthritis or lupus  · Tendonitis (inflammation of chords that attach muscle to bone)  Home care  · Rest the involved joint(s) until your symptoms improve.   · You may be prescribed pain medicine. If none is prescribed, you may use acetaminophen or ibuprofen to control pain and inflammation.  Follow-up care  Follow up with your healthcare provider or as advised.  When to seek medical advice  Contact your healthcare provider right away if any of the following occurs:  · Pain, swelling, or redness of joint increases  · Pain worsens or recurs after a period of improvement  · Pain moves to other joints  · You cannot bear weight on the affected joint   · You cannot move the affected joint  · Joint appears deformed  · New rash appears  · Fever of 100.4ºF (38ºC) or higher, or as directed by your healthcare provider  Date Last Reviewed: 3/1/2017  © 5605-3941 [employer] [address] [place] [address], [place] [address]. All rights reserved. This information is not intended as a substitute for professional medical care. Always follow your healthcare professional's instructions.
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
## 2019-09-06 ENCOUNTER — PATIENT MESSAGE (OUTPATIENT)
Dept: FAMILY MEDICINE | Facility: CLINIC | Age: 59
End: 2019-09-06

## 2019-09-06 DIAGNOSIS — M06.9 FLARE OF RHEUMATOID ARTHRITIS: Primary | ICD-10-CM

## 2019-09-06 RX ORDER — PREDNISONE 20 MG/1
40 TABLET ORAL DAILY
Qty: 10 TABLET | Refills: 0 | Status: SHIPPED | OUTPATIENT
Start: 2019-09-06 | End: 2019-09-11

## 2019-09-11 ENCOUNTER — PATIENT MESSAGE (OUTPATIENT)
Dept: FAMILY MEDICINE | Facility: CLINIC | Age: 59
End: 2019-09-11

## 2019-09-12 ENCOUNTER — PATIENT MESSAGE (OUTPATIENT)
Dept: FAMILY MEDICINE | Facility: CLINIC | Age: 59
End: 2019-09-12

## 2019-09-12 ENCOUNTER — TELEPHONE (OUTPATIENT)
Dept: FAMILY MEDICINE | Facility: CLINIC | Age: 59
End: 2019-09-12

## 2019-09-12 NOTE — TELEPHONE ENCOUNTER
Returned call and scheduled patient for the first available Rheumatology appointment 10/29 @ 11:30a. No answer or voicemail set up. I will mail the reminder letter today. Thanks.

## 2019-09-24 NOTE — PROGRESS NOTES
Digital Medicine Program Enrollment    Hypertension       6th attempt for enrollment call.  No answer, left voicemail.  Sent patient portal message and also sent enrolling provider message.

## 2019-09-26 ENCOUNTER — PATIENT MESSAGE (OUTPATIENT)
Dept: FAMILY MEDICINE | Facility: CLINIC | Age: 59
End: 2019-09-26

## 2019-09-26 ENCOUNTER — TELEPHONE (OUTPATIENT)
Dept: RHEUMATOLOGY | Facility: CLINIC | Age: 59
End: 2019-09-26

## 2019-09-27 ENCOUNTER — PATIENT MESSAGE (OUTPATIENT)
Dept: RHEUMATOLOGY | Facility: CLINIC | Age: 59
End: 2019-09-27

## 2019-10-03 ENCOUNTER — HOSPITAL ENCOUNTER (OUTPATIENT)
Dept: RADIOLOGY | Facility: HOSPITAL | Age: 59
Discharge: HOME | End: 2019-10-03
Attending: INTERNAL MEDICINE
Payer: COMMERCIAL

## 2019-10-03 ENCOUNTER — LAB VISIT (OUTPATIENT)
Dept: LAB | Facility: HOSPITAL | Age: 59
End: 2019-10-03
Attending: INTERNAL MEDICINE
Payer: COMMERCIAL

## 2019-10-03 ENCOUNTER — TELEPHONE (OUTPATIENT)
Dept: FAMILY MEDICINE | Facility: CLINIC | Age: 59
End: 2019-10-03

## 2019-10-03 ENCOUNTER — OFFICE VISIT (OUTPATIENT)
Dept: RHEUMATOLOGY | Facility: CLINIC | Age: 59
End: 2019-10-03
Payer: COMMERCIAL

## 2019-10-03 VITALS
SYSTOLIC BLOOD PRESSURE: 168 MMHG | HEIGHT: 67 IN | DIASTOLIC BLOOD PRESSURE: 98 MMHG | HEART RATE: 76 BPM | WEIGHT: 191.81 LBS | BODY MASS INDEX: 30.1 KG/M2

## 2019-10-03 DIAGNOSIS — E78.00 PURE HYPERCHOLESTEROLEMIA: ICD-10-CM

## 2019-10-03 DIAGNOSIS — G89.29 CHRONIC BILATERAL LOW BACK PAIN WITHOUT SCIATICA: Chronic | ICD-10-CM

## 2019-10-03 DIAGNOSIS — M54.50 CHRONIC BILATERAL LOW BACK PAIN WITHOUT SCIATICA: Chronic | ICD-10-CM

## 2019-10-03 DIAGNOSIS — Z79.52 CURRENT USE OF STEROID MEDICATION: ICD-10-CM

## 2019-10-03 DIAGNOSIS — E66.3 OVERWEIGHT (BMI 25.0-29.9): ICD-10-CM

## 2019-10-03 DIAGNOSIS — I10 ESSENTIAL (PRIMARY) HYPERTENSION: ICD-10-CM

## 2019-10-03 DIAGNOSIS — M05.79 RHEUMATOID ARTHRITIS INVOLVING MULTIPLE SITES WITH POSITIVE RHEUMATOID FACTOR: ICD-10-CM

## 2019-10-03 DIAGNOSIS — K21.9 GERD WITHOUT ESOPHAGITIS: ICD-10-CM

## 2019-10-03 DIAGNOSIS — I10 ESSENTIAL (PRIMARY) HYPERTENSION: Chronic | ICD-10-CM

## 2019-10-03 DIAGNOSIS — G89.29 CHRONIC BILATERAL LOW BACK PAIN WITHOUT SCIATICA: ICD-10-CM

## 2019-10-03 DIAGNOSIS — M54.50 CHRONIC BILATERAL LOW BACK PAIN WITHOUT SCIATICA: ICD-10-CM

## 2019-10-03 DIAGNOSIS — E78.00 PURE HYPERCHOLESTEROLEMIA: Chronic | ICD-10-CM

## 2019-10-03 DIAGNOSIS — I10 ESSENTIAL (PRIMARY) HYPERTENSION: Primary | Chronic | ICD-10-CM

## 2019-10-03 DIAGNOSIS — M05.79 RHEUMATOID ARTHRITIS INVOLVING MULTIPLE SITES WITH POSITIVE RHEUMATOID FACTOR: Primary | ICD-10-CM

## 2019-10-03 DIAGNOSIS — I49.9 IRREGULAR HEART BEAT: ICD-10-CM

## 2019-10-03 DIAGNOSIS — K21.9 GERD WITHOUT ESOPHAGITIS: Chronic | ICD-10-CM

## 2019-10-03 LAB
BACTERIA #/AREA URNS AUTO: ABNORMAL /HPF
BILIRUB UR QL STRIP: NEGATIVE
CLARITY UR REFRACT.AUTO: CLEAR
COLOR UR AUTO: YELLOW
GLUCOSE UR QL STRIP: NEGATIVE
HGB UR QL STRIP: ABNORMAL
KETONES UR QL STRIP: NEGATIVE
LEUKOCYTE ESTERASE UR QL STRIP: NEGATIVE
MICROSCOPIC COMMENT: ABNORMAL
NITRITE UR QL STRIP: NEGATIVE
PH UR STRIP: 6 [PH] (ref 5–8)
PROT UR QL STRIP: NEGATIVE
RBC #/AREA URNS AUTO: 5 /HPF (ref 0–4)
SP GR UR STRIP: 1.02 (ref 1–1.03)
SQUAMOUS #/AREA URNS AUTO: 0 /HPF
URN SPEC COLLECT METH UR: ABNORMAL
WBC #/AREA URNS AUTO: 2 /HPF (ref 0–5)

## 2019-10-03 PROCEDURE — 72040 X-RAY EXAM NECK SPINE 2-3 VW: CPT | Mod: 26,,, | Performed by: RADIOLOGY

## 2019-10-03 PROCEDURE — 99244 PR OFFICE CONSULTATION,LEVEL IV: ICD-10-PCS | Mod: S$GLB,,, | Performed by: INTERNAL MEDICINE

## 2019-10-03 PROCEDURE — 99999 PR PBB SHADOW E&M-EST. PATIENT-LVL V: CPT | Mod: PBBFAC,,, | Performed by: INTERNAL MEDICINE

## 2019-10-03 PROCEDURE — 72040 XR CERVICAL SPINE FLEXION  AND EXTENSION ONLY: ICD-10-PCS | Mod: 26,,, | Performed by: RADIOLOGY

## 2019-10-03 PROCEDURE — 71046 XR CHEST PA AND LATERAL: ICD-10-PCS | Mod: 26,,, | Performed by: RADIOLOGY

## 2019-10-03 PROCEDURE — 71046 X-RAY EXAM CHEST 2 VIEWS: CPT | Mod: 26,,, | Performed by: RADIOLOGY

## 2019-10-03 PROCEDURE — 73620 X-RAY EXAM OF FOOT: CPT | Mod: 26,50,, | Performed by: RADIOLOGY

## 2019-10-03 PROCEDURE — 99999 PR PBB SHADOW E&M-EST. PATIENT-LVL V: ICD-10-PCS | Mod: PBBFAC,,, | Performed by: INTERNAL MEDICINE

## 2019-10-03 PROCEDURE — 73620 XR FOOT AP BILAT: ICD-10-PCS | Mod: 26,50,, | Performed by: RADIOLOGY

## 2019-10-03 PROCEDURE — 99244 OFF/OP CNSLTJ NEW/EST MOD 40: CPT | Mod: S$GLB,,, | Performed by: INTERNAL MEDICINE

## 2019-10-03 PROCEDURE — 72040 X-RAY EXAM NECK SPINE 2-3 VW: CPT | Mod: TC

## 2019-10-03 PROCEDURE — 73620 X-RAY EXAM OF FOOT: CPT | Mod: TC,50

## 2019-10-03 PROCEDURE — 71046 X-RAY EXAM CHEST 2 VIEWS: CPT | Mod: TC

## 2019-10-03 PROCEDURE — 81001 URINALYSIS AUTO W/SCOPE: CPT

## 2019-10-03 RX ORDER — PREDNISONE 5 MG/1
10 TABLET ORAL DAILY
Qty: 60 TABLET | Refills: 0 | Status: SHIPPED | OUTPATIENT
Start: 2019-10-03 | End: 2019-10-27 | Stop reason: SDUPTHER

## 2019-10-03 RX ORDER — PANTOPRAZOLE SODIUM 20 MG/1
20 TABLET, DELAYED RELEASE ORAL DAILY
Qty: 30 TABLET | Refills: 2 | Status: SHIPPED | OUTPATIENT
Start: 2019-10-03 | End: 2019-12-30

## 2019-10-03 ASSESSMENT — ROUTINE ASSESSMENT OF PATIENT INDEX DATA (RAPID3)
MDHAQ FUNCTION SCORE: 1
FATIGUE SCORE: 3
TOTAL RAPID3 SCORE: 3.94
WHEN YOU AWAKENED IN THE MORNING OVER THE LAST WEEK, PLEASE INDICATE THE AMOUNT OF TIME IT TAKES UNTIL YOU ARE AS LIMBER AS YOU WILL BE FOR THE DAY: 24 HOURS
PSYCHOLOGICAL DISTRESS SCORE: 3.3
PAIN SCORE: 3.5
AM STIFFNESS SCORE: 1, YES
PATIENT GLOBAL ASSESSMENT SCORE: 5

## 2019-10-03 NOTE — LETTER
October 3, 2019      Zoila Montes De Oca, ANMOL  [address]  [place] [address]           [facility] - Rheumatology  [address]  [place] [address]  Phone: [phone]  Fax: [phone]          Patient: Kendra Griffin   MR Number: [id number]   YOB: 1960   Date of Visit: 10/3/2019       Dear Zoila Montes De Oca:    Thank you for referring Kendra Griffin to me for evaluation. Attached you will find relevant portions of my assessment and plan of care.    If you have questions, please do not hesitate to call me. I look forward to following Kendra Griffin along with you.    Sincerely,    Donna Valdez MD    Enclosure  CC:  No Recipients    If you would like to receive this communication electronically, please contact [email or web address] or [phone] to request more information on [employer] Link access.    For providers and/or their staff who would like to refer a patient to [facility], please contact us through our one-stop-shop provider referral line, [facility], at [phone].    If you feel you have received this communication in error or would no longer like to receive these types of communications, please e-mail [email or web address]

## 2019-10-03 NOTE — PATIENT INSTRUCTIONS
Prednisone tablets  What is this medicine?  PREDNISONE (PRED ni sone) is a corticosteroid. It is commonly used to treat inflammation of the skin, joints, lungs, and other organs. Common conditions treated include asthma, allergies, and arthritis. It is also used for other conditions, such as blood disorders and diseases of the adrenal glands.  How should I use this medicine?  Take this medicine by mouth with a glass of water. Follow the directions on the prescription label. Take this medicine with food. If you are taking this medicine once a day, take it in the morning. Do not take more medicine than you are told to take. Do not suddenly stop taking your medicine because you may develop a severe reaction. Your doctor will tell you how much medicine to take. If your doctor wants you to stop the medicine, the dose may be slowly lowered over time to avoid any side effects.  Talk to your pediatrician regarding the use of this medicine in children. Special care may be needed.  What side effects may I notice from receiving this medicine?  Side effects that you should report to your doctor or health care professional as soon as possible:  · allergic reactions like skin rash, itching or hives, swelling of the face, lips, or tongue  · changes in emotions or moods  · changes in vision  · depressed mood  · eye pain  · fever or chills, cough, sore throat, pain or difficulty passing urine  · increased thirst  · swelling of ankles, feet  Side effects that usually do not require medical attention (report to your doctor or health care professional if they continue or are bothersome):  · confusion, excitement, restlessness  · headache  · nausea, vomiting  · skin problems, acne, thin and shiny skin  · trouble sleeping  · weight gain  What may interact with this medicine?  Do not take this medicine with any of the following medications:  · metyrapone  · mifepristone  This medicine may also interact with the following  medications:  · aminoglutethimide  · amphotericin B  · aspirin and aspirin-like medicines  · barbiturates  · certain medicines for diabetes, like glipizide or glyburide  · cholestyramine  · cholinesterase inhibitors  · cyclosporine  · digoxin  · diuretics  · ephedrine  · female hormones, like estrogens and birth control pills  · isoniazid  · ketoconazole  · NSAIDS, medicines for pain and inflammation, like ibuprofen or naproxen  · phenytoin  · rifampin  · toxoids  · vaccines  · warfarin  What if I miss a dose?  If you miss a dose, take it as soon as you can. If it is almost time for your next dose, talk to your doctor or health care professional. You may need to miss a dose or take an extra dose. Do not take double or extra doses without advice.  Where should I keep my medicine?  Keep out of the reach of children.  Store at room temperature between 15 and 30 degrees C (59 and 86 degrees F). Protect from light. Keep container tightly closed. Throw away any unused medicine after the expiration date.  What should I tell my health care provider before I take this medicine?  They need to know if you have any of these conditions:  · Cushing's syndrome  · diabetes  · glaucoma  · heart disease  · high blood pressure  · infection (especially a virus infection such as chickenpox, cold sores, or herpes)  · kidney disease  · liver disease  · mental illness  · myasthenia gravis  · osteoporosis  · seizures  · stomach or intestine problems  · thyroid disease  · an unusual or allergic reaction to lactose, prednisone, other medicines, foods, dyes, or preservatives  · pregnant or trying to get pregnant  · breast-feeding  What should I watch for while using this medicine?  Visit your doctor or health care professional for regular checks on your progress. If you are taking this medicine over a prolonged period, carry an identification card with your name and address, the type and dose of your medicine, and your doctor's name and  address.  This medicine may increase your risk of getting an infection. Tell your doctor or health care professional if you are around anyone with measles or chickenpox, or if you develop sores or blisters that do not heal properly.  If you are going to have surgery, tell your doctor or health care professional that you have taken this medicine within the last twelve months.  Ask your doctor or health care professional about your diet. You may need to lower the amount of salt you eat.  This medicine may affect blood sugar levels. If you have diabetes, check with your doctor or health care professional before you change your diet or the dose of your diabetic medicine.  NOTE:This sheet is a summary. It may not cover all possible information. If you have questions about this medicine, talk to your doctor, pharmacist, or health care provider. Copyright© 2017 [employer]        Methotrexate tablets  What is this medicine?  METHOTREXATE (METH oh TREX ate) is a chemotherapy drug used to treat cancer including breast cancer, leukemia, and lymphoma. This medicine can also be used to treat psoriasis and certain kinds of arthritis.  How should I use this medicine?  Take this medicine by mouth with a glass of water. Follow the directions on the prescription label. Take your medicine at regular intervals. Do not take it more often than directed. Do not stop taking except on your doctor's advice.  Make sure you know why you are taking this medicine and how often you should take it. If this medicine is used for a condition that is not cancer, like arthritis or psoriasis, it should be taken weekly, NOT daily. Taking this medicine more often than directed can cause serious side effects, even death.  Talk to your healthcare provider about safe handling and disposal of this medicine. You may need to take special precautions.  Talk to your pediatrician regarding the use of this medicine in children. While this drug may be prescribed  for selected conditions, precautions do apply.  What side effects may I notice from receiving this medicine?  Side effects that you should report to your doctor or health care professional as soon as possible:  · allergic reactions like skin rash, itching or hives, swelling of the face, lips, or tongue  · breathing problems or shortness of breath  · diarrhea  · dry, nonproductive cough  · low blood counts - this medicine may decrease the number of white blood cells, red blood cells and platelets. You may be at increased risk for infections and bleeding.  · mouth sores  · redness, blistering, peeling or loosening of the skin, including inside the mouth  · signs of infection - fever or chills, cough, sore throat, pain or trouble passing urine  · signs and symptoms of bleeding such as bloody or black, tarry stools; red or dark-brown urine; spitting up blood or brown material that looks like coffee grounds; red spots on the skin; unusual bruising or bleeding from the eye, gums, or nose  · signs and symptoms of kidney injury like trouble passing urine or change in the amount of urine  · signs and symptoms of liver injury like dark yellow or brown urine; general ill feeling or flu-like symptoms; light-colored stools; loss of appetite; nausea; right upper belly pain; unusually weak or tired; yellowing of the eyes or skin  Side effects that usually do not require medical attention (report to your doctor or health care professional if they continue or are bothersome):  · dizziness  · hair loss  · tiredness  · upset stomach  · vomiting  What may interact with this medicine?  This medicine may interact with the following medication:  · acitretin  · aspirin and aspirin-like medicines including salicylates  · azathioprine  · certain antibiotics like penicillins, tetracycline, and chloramphenicol  · cyclosporine  · gold  · hydroxychloroquine  · live virus vaccines  · NSAIDs, medicines for pain and inflammation, like ibuprofen or  naproxen  · other cytotoxic agents  · penicillamine  · phenylbutazone  · phenytoin  · probenecid  · retinoids such as isotretinoin and tretinoin  · steroid medicines like prednisone or cortisone  · sulfonamides like sulfasalazine and trimethoprim/sulfamethoxazole  · theophylline  What if I miss a dose?  If you miss a dose, talk with your doctor or health care professional. Do not take double or extra doses.  Where should I keep my medicine?  Keep out of the reach of children.  Store at room temperature between 20 and 25 degrees C (68 and 77 degrees F). Protect from light. Throw away any unused medicine after the expiration date.  What should I tell my health care provider before I take this medicine?  They need to know if you have any of these conditions:  · fluid in the stomach area or lungs  · if you often drink alcohol  · infection or immune system problems  · kidney disease or on hemodialysis  · liver disease  · low blood counts, like low white cell, platelet, or red cell counts  · lung disease  · radiation therapy  · stomach ulcers  · ulcerative colitis  · an unusual or allergic reaction to methotrexate, other medicines, foods, dyes, or preservatives  · pregnant or trying to get pregnant  · breast-feeding  What should I watch for while using this medicine?  Avoid alcoholic drinks.  This medicine can make you more sensitive to the sun. Keep out of the sun. If you cannot avoid being in the sun, wear protective clothing and use sunscreen. Do not use sun lamps or tanning beds/booths.  You may need blood work done while you are taking this medicine.  Call your doctor or health care professional for advice if you get a fever, chills or sore throat, or other symptoms of a cold or flu. Do not treat yourself. This drug decreases your body's ability to fight infections. Try to avoid being around people who are sick.  This medicine may increase your risk to bruise or bleed. Call your doctor or health care professional if  you notice any unusual bleeding.  Check with your doctor or health care professional if you get an attack of severe diarrhea, nausea and vomiting, or if you sweat a lot. The loss of too much body fluid can make it dangerous for you to take this medicine.  Talk to your doctor about your risk of cancer. You may be more at risk for certain types of cancers if you take this medicine.  Both men and women must use effective birth control with this medicine. Do not become pregnant while taking this medicine or until at least 1 normal menstrual cycle has occurred after stopping it. Women should inform their doctor if they wish to become pregnant or think they might be pregnant. Men should not father a child while taking this medicine and for 3 months after stopping it. There is a potential for serious side effects to an unborn child. Talk to your health care professional or pharmacist for more information. Do not breast-feed an infant while taking this medicine.  NOTE:This sheet is a summary. It may not cover all possible information. If you have questions about this medicine, talk to your doctor, pharmacist, or health care provider. Copyright© 2017 [employer]        Pantoprazole tablets  What is this medicine?  PANTOPRAZOLE (pan TOE pra zole) prevents the production of acid in the stomach. It is used to treat gastroesophageal reflux disease (GERD), inflammation of the esophagus, and Zollinger-Loza syndrome.  How should I use this medicine?  Take this medicine by mouth. Swallow the tablets whole with a drink of water. Follow the directions on the prescription label. Do not crush, break, or chew. Take your medicine at regular intervals. Do not take your medicine more often than directed.  Talk to your pediatrician regarding the use of this medicine in children. While this drug may be prescribed for children as young as 5 years for selected conditions, precautions do apply.  What side effects may I notice from receiving  this medicine?  Side effects that you should report to your doctor or health care professional as soon as possible:  · allergic reactions like skin rash, itching or hives, swelling of the face, lips, or tongue  · bone, muscle or joint pain  · breathing problems  · chest pain or chest tightness  · dark yellow or brown urine  · dizziness  · fast, irregular heartbeat  · feeling faint or lightheaded  · fever or sore throat  · muscle spasm  · palpitations  · rash on cheeks or arms that gets worse in the sun  · redness, blistering, peeling or loosening of the skin, including inside the mouth  · seizures  · tremors  · unusual bleeding or bruising  · unusually weak or tired  · yellowing of the eyes or skin  Side effects that usually do not require medical attention (Report these to your doctor or health care professional if they continue or are bothersome.):  · constipation  · diarrhea  · dry mouth  · headache  · nausea  What may interact with this medicine?  Do not take this medicine with any of the following medications:  · atazanavir  · nelfinavir  This medicine may also interact with the following medications:  · ampicillin  · delavirdine  · erlotinib  · iron salts  · medicines for fungal infections like ketoconazole, itraconazole and voriconazole  · methotrexate  · mycophenolate mofetil  · warfarin  What if I miss a dose?  If you miss a dose, take it as soon as you can. If it is almost time for your next dose, take only that dose. Do not take double or extra doses.  Where should I keep my medicine?  Keep out of the reach of children.  Store at room temperature between 15 and 30 degrees C (59 and 86 degrees F). Protect from light and moisture. Throw away any unused medicine after the expiration date.  What should I tell my health care provider before I take this medicine?  They need to know if you have any of these conditions:  · liver disease  · low levels of magnesium in the blood  · lupus  · an unusual or allergic  reaction to omeprazole, lansoprazole, pantoprazole, rabeprazole, other medicines, foods, dyes, or preservatives  · pregnant or trying to get pregnant  · breast-feeding  What should I watch for while using this medicine?  It can take several days before your stomach pain gets better. Check with your doctor or health care professional if your condition does not start to get better, or if it gets worse.  You may need blood work done while you are taking this medicine.  NOTE:This sheet is a summary. It may not cover all possible information. If you have questions about this medicine, talk to your doctor, pharmacist, or health care provider. Copyright© 2017 [employer]

## 2019-10-03 NOTE — MEDICAL/APP STUDENT
Subjective:       Patient ID: Kendra Griffin is a 58 y.o. female.    Chief Complaint: Rheumatoid arthritis     Referred to clinic PCP for management of rheumatoid arthritis - Rh factor and anti-CCP positive.    Newly diagnosed in August 2019. Affecting both hands, shoulders, both feet, left knee. Noticed swelling, pain and reduced mobility. Has had to take time off work. Morning stiffness for 1 hour. Pain 8/10 and constant throughout day, waking at night. Having fevers and chills at night, no recorded temperatures. NSAIDS n do not help. Given prednisolone 10mg for 5 days by NP which improved symptoms for 2 weeks. Severe fatigue. Hasn't been exercising in the morning as normal. Having difficulty with daily tasks. Hair loss - generally thinning over scalp    Review of Systems   Constitutional: Positive for chills (fever and chills at night), fatigue and unexpected weight change (weight loss - not quantified).   HENT: Negative.    Eyes: Negative.    Respiratory: Negative for cough, chest tightness and shortness of breath.    Cardiovascular: Positive for palpitations (Feels occasional missed beat. Had Holter in 2017 which showed no arrhythmias).   Gastrointestinal: Negative.    Musculoskeletal: Positive for arthralgias, back pain (long-term, stable), joint swelling (bilateral 1st, 2nd, 3rd MCP and left knee) and neck stiffness (difficult looking over shoulder).   Skin: Negative.    Neurological: Positive for numbness (numbness and tingling in both hands).     PMH:   Hypertension, hypercholesterolemia, heart murmur (2017), chronic back pain, GERD, gastric ulcers, microcytic anaemia    Medications:   Amlodipine 10mg daily  Losartan 50mg daily  Aspirin 81mg daily   Famotidine 40mg daily PRN  meloxicam 15mg daily PRN  Montelukast 10mg daily    Allergies: nil     FH:  Negative for RA, lupus, scleroderma, IBD    SH:   Single - lives alone  Works as a  at a school in [place]   2 children, both healthy  Nil  alcohol/tobacco use  Nil illicit drug use  Objective:       Vitals:    10/03/19 0843   BP: (!) 168/98   Pulse: 76     Physical Exam   Constitutional: She appears well-developed.   Neck: Decreased range of motion present.   Cardiovascular: Normal rate, regular rhythm, S1 normal and S2 normal.   Murmur heard.   Crescendo decrescendo systolic murmur is present with a grade of 2/6.  Murmur radiates to the carotids   Pulmonary/Chest: Effort normal and breath sounds normal.   Musculoskeletal: She exhibits edema (bilateral swelling of 1st/2nd/3rd MCP), tenderness and deformity (ulnar deviation at MCP joints).        Right shoulder: She exhibits decreased range of motion and tenderness.        Left shoulder: She exhibits decreased range of motion and tenderness.        Right wrist: She exhibits decreased range of motion and tenderness. She exhibits no swelling.        Left wrist: She exhibits decreased range of motion and tenderness. She exhibits no swelling.        Left knee: She exhibits swelling. She exhibits normal range of motion. Tenderness found.   28 joint count - 3 swollen bilaterally and 13 tender.   Pain on squeezing the MTPs bilaterally       Labs: 09/05/19  CCP antibodies - 125.9  Rh factor - 320.0  SKYLA positive 1:320  CRP 8.7  ESR 35    Assessment:        1. Seropositive rheumatoid arthritis   2. Hypertension   3. Hypercholesterolemia   4. Obesity   5. Irregular heart beat - negative Holter study in 2017  6. Heart murmur  7. GERD  8. Chronic lower back pain   Plan:       1. Labs - CMC, CMP, Vitamin D, Hepatitis B/C and HIV serology, Quantiferon gold testing  2. Vaccinations - pneumovax, shingles vaccine  3. Immunosuppressants - prednisolone 10mg daily, with view to start methotrexate and folic acid   4. Review with PCP follow-up plan for abnormal stress ECHO in 2017

## 2019-10-03 NOTE — PROGRESS NOTES
Rapid3 Question Responses and Scores 10/2/2019   MDHAQ Score 1   Psychologic Score 3.3   Pain Score 3.5   When you awakened in the morning OVER THE LAST WEEK, did you feel stiff? Yes   If Yes, please indicate the number of hours until you are as limber as you will be for the day 24   Fatigue Score 3   Global Health Score 5   RAPID3 Score 3.94       Answers for HPI/ROS submitted by the patient on 10/2/2019   fever: No  eye redness: No  headaches: No  shortness of breath: No  chest pain: No  trouble swallowing: No  diarrhea: No  constipation: No  unexpected weight change: No  genital sore: No  dysuria: No  During the last 3 days, have you had a skin rash?: No  Bruises or bleeds easily: No  cough: No

## 2019-10-03 NOTE — PROGRESS NOTES
"Subjective:       Patient ID: Kendra Griffin is a 58 y.o. female.    Chief Complaint: Joint pain    HPI:  Kendra Griffin is a 58 y.o. female with HTN, elevated cholesterol, anemia, and GERD sent in consultation by Dr. Armando Knox for RA.  Since July 2019 had multiple joint pain and decrease mobility.  RF and CCP were found to be positive.  Hands, shoulders, feet and left knee are painful.  History of left knee injury.  One hour morning stiffness.  8/10 pain worse at night that inhibit sleep.  Joints warm and feels feverish and chills at times.  Significant fatigue and unable to work as .  Hair loss started at same time with generalized thinning.   History back pain.      Lupus Review of Systems  Alopecia: yes  Photosensitivity: no   Raynaud's: no  Oral or nasal ulcers: no  Rashes: no  No pleurisy or pericarditis.  No seizures, psychosis, or stroke.  No venous or arterial clots.  Pregnancy hx (if applicable): no miscarriages         Review of Systems   Constitutional: Negative for fever and unexpected weight change.   HENT: Negative for trouble swallowing.    Eyes: Negative for redness.   Respiratory: Negative for cough and shortness of breath.    Cardiovascular: Negative for chest pain.   Gastrointestinal: Negative for constipation and diarrhea.   Genitourinary: Negative for dysuria and genital sores.   Musculoskeletal: Positive for arthralgias.   Skin: Negative for rash.   Neurological: Negative for headaches.   Hematological: Does not bruise/bleed easily.         Objective:   BP (!) 168/98 (BP Location: Right arm, Patient Position: Sitting, BP Method: Large (Automatic))   Pulse 76   Ht 5' 7" (1.702 m)   Wt 87 kg (191 lb 12.8 oz)   BMI 30.04 kg/m²      Physical Exam   Constitutional: She is oriented to person, place, and time and well-developed, well-nourished, and in no distress.   HENT:   Head: Normocephalic and atraumatic.   Eyes: Conjunctivae and EOM are normal.   Neck: Neck supple. "   Cardiovascular: Normal rate, regular rhythm and normal heart sounds.    Pulmonary/Chest: Effort normal and breath sounds normal.   Abdominal: Soft. Bowel sounds are normal.   Neurological: She is alert and oriented to person, place, and time. Gait normal.   Skin: Skin is warm and dry.     Psychiatric: Mood and affect normal.   Musculoskeletal:   28 joint count: 2 swollen bilateral 2nd mcp and 6 tender (1-3rd Mcp bilateral)  Decreased ROM wrists  Pain compression MTPs            LABS  Component      Latest Ref Rng & Units 9/5/2019   Anti Sm Antibody      0.00 - 19.99 EU 1.68   Anti-Sm Interpretation      Negative Negative   Anti-SSA Antibody      0.00 - 19.99 EU 1.89   Anti-SSA Interpretation      Negative Negative   Anti-SSB Antibody      0.00 - 19.99 EU 0.10   Anti-SSB Interpretation      Negative Negative   ds DNA Ab      Negative 1:10 Negative 1:10   Anti Sm/RNP Antibody      0.00 - 19.99 EU 0.84   Anti-Sm/RNP Interpretation      Negative Negative   Rheumatoid Factor      0.0 - 15.0 IU/mL 320.0 (H)   Sed Rate      0 - 36 mm/Hr 35   CRP      0.0 - 8.2 mg/L 8.7 (H)   SKYLA Screen      Negative <1:160 Positive (A)   CCP Antibodies      <5.0 U/mL 125.9 (H)   SKYLA HEP-2 Titer       Positive 1:320 Homogeneous     X-ray hands and knees 9/5/19 with mild DJD    Assessment:       1.  Rheumatoid arthritis.  Manifested by joint pain, positive RF/CCP/SKYLA, elevated CRP  2.  Hypertension.  Did not take BP  3.  Hypercholesterolemia  4.  Anemia  5.  GERD with esophagitis  6.  Chronic back pain   7.  History of murmur.  Evaluated by cardiology and no significant issue found.  8.  Abnormal stress echo.  Will message primary to determine management.       Plan:       1.  Pre DMARD labs  2.  X-ray c spine and feet  3.  Refer to ID vaccination  4.  Trial of prednisone 10 mg daily. Add Omeprazole daily  5.  Calcium 1,000 mg daily and vitamin D 1,000 units daily  6.  DEXA

## 2019-10-03 NOTE — Clinical Note
Thanks for the consult.  My concern is that she has SOB and feeling irregular heart beats. Her BP was elevated today but she did not take her medication today.  In 2017 her stress echo showed area of moderate ischemia.  How is this being managed?  RA increases her risk of MI.  I will be giving her steroid while we prepare to start DMARD.

## 2019-10-07 ENCOUNTER — PATIENT MESSAGE (OUTPATIENT)
Dept: RHEUMATOLOGY | Facility: CLINIC | Age: 59
End: 2019-10-07

## 2019-10-07 DIAGNOSIS — M05.79 RHEUMATOID ARTHRITIS INVOLVING MULTIPLE SITES WITH POSITIVE RHEUMATOID FACTOR: Primary | ICD-10-CM

## 2019-10-07 DIAGNOSIS — Z79.631 METHOTREXATE, LONG TERM, CURRENT USE: ICD-10-CM

## 2019-10-09 ENCOUNTER — TELEPHONE (OUTPATIENT)
Dept: FAMILY MEDICINE | Facility: CLINIC | Age: 59
End: 2019-10-09

## 2019-10-09 NOTE — TELEPHONE ENCOUNTER
Attempted to contact the patient regarding a referral to Cardiology, unable to leave a message. Letter mailed

## 2019-10-09 NOTE — LETTER
October 9, 2019    Kendra [place] [id number]             [facility]  [address] [address]  Phone: [phone]  Fax: [phone] Dear Ms. Griffin:    I have been unable to reach you by phone for your appointment to Cardiology. Please call me at the clinic [phone] to book your appointment.      If you have any questions or concerns, please don't hesitate to call.    Sincerely,        Manisha Petit MA

## 2019-10-10 RX ORDER — FOLIC ACID 1 MG/1
1 TABLET ORAL DAILY
Qty: 30 TABLET | Refills: 2 | Status: SHIPPED | OUTPATIENT
Start: 2019-10-10 | End: 2020-02-28

## 2019-10-10 RX ORDER — METHOTREXATE 2.5 MG/1
10 TABLET ORAL
Qty: 16 TABLET | Refills: 2 | Status: SHIPPED | OUTPATIENT
Start: 2019-10-10 | End: 2020-03-19 | Stop reason: SDUPTHER

## 2019-10-10 NOTE — TELEPHONE ENCOUNTER
Called patient to discuss plan but no answer and unable to leave a message.  Will proceed with methotrexate 4 tabs q week and folic acid 1 mg daily.  E-mail sent to patient with full details.

## 2019-10-15 NOTE — PROGRESS NOTES
Digital Medicine Program Enrollment  HPI     7th  attempt for enrollment call.  No answer, left voicemail. Sent pt incomplete enrollment letter.

## 2019-10-23 ENCOUNTER — PATIENT MESSAGE (OUTPATIENT)
Dept: INFECTIOUS DISEASES | Facility: CLINIC | Age: 59
End: 2019-10-23

## 2019-10-25 ENCOUNTER — OFFICE VISIT (OUTPATIENT)
Dept: INFECTIOUS DISEASES | Facility: CLINIC | Age: 59
End: 2019-10-25
Payer: COMMERCIAL

## 2019-10-25 ENCOUNTER — HOSPITAL ENCOUNTER (OUTPATIENT)
Dept: RADIOLOGY | Facility: CLINIC | Age: 59
Discharge: HOME | End: 2019-10-25
Attending: INTERNAL MEDICINE
Payer: COMMERCIAL

## 2019-10-25 ENCOUNTER — CLINICAL SUPPORT (OUTPATIENT)
Dept: INFECTIOUS DISEASES | Facility: CLINIC | Age: 59
End: 2019-10-25
Payer: COMMERCIAL

## 2019-10-25 VITALS
WEIGHT: 192 LBS | BODY MASS INDEX: 30.13 KG/M2 | TEMPERATURE: 98 F | SYSTOLIC BLOOD PRESSURE: 150 MMHG | HEART RATE: 78 BPM | DIASTOLIC BLOOD PRESSURE: 97 MMHG | HEIGHT: 67 IN

## 2019-10-25 DIAGNOSIS — M05.79 RHEUMATOID ARTHRITIS INVOLVING MULTIPLE SITES WITH POSITIVE RHEUMATOID FACTOR: ICD-10-CM

## 2019-10-25 DIAGNOSIS — Z71.85 VACCINE COUNSELING: Primary | ICD-10-CM

## 2019-10-25 DIAGNOSIS — D84.9 IMMUNOSUPPRESSION: ICD-10-CM

## 2019-10-25 DIAGNOSIS — Z79.52 CURRENT USE OF STEROID MEDICATION: ICD-10-CM

## 2019-10-25 PROCEDURE — 90670 PNEUMOCOCCAL CONJUGATE VACCINE 13-VALENT LESS THAN 5YO & GREATER THAN: ICD-10-PCS | Mod: S$GLB,,, | Performed by: INTERNAL MEDICINE

## 2019-10-25 PROCEDURE — 3077F SYST BP >= 140 MM HG: CPT | Mod: CPTII,S$GLB,, | Performed by: INTERNAL MEDICINE

## 2019-10-25 PROCEDURE — 99204 OFFICE O/P NEW MOD 45 MIN: CPT | Mod: 25,S$GLB,, | Performed by: INTERNAL MEDICINE

## 2019-10-25 PROCEDURE — 77080 DEXA BONE DENSITY SPINE HIP: ICD-10-PCS | Mod: 26,,, | Performed by: INTERNAL MEDICINE

## 2019-10-25 PROCEDURE — 90632 HEPATITIS A VACCINE ADULT IM: ICD-10-PCS | Mod: S$GLB,,, | Performed by: INTERNAL MEDICINE

## 2019-10-25 PROCEDURE — 77080 DXA BONE DENSITY AXIAL: CPT | Mod: 26,,, | Performed by: INTERNAL MEDICINE

## 2019-10-25 PROCEDURE — 90632 HEPA VACCINE ADULT IM: CPT | Mod: S$GLB,,, | Performed by: INTERNAL MEDICINE

## 2019-10-25 PROCEDURE — 3077F PR MOST RECENT SYSTOLIC BLOOD PRESSURE >= 140 MM HG: ICD-10-PCS | Mod: CPTII,S$GLB,, | Performed by: INTERNAL MEDICINE

## 2019-10-25 PROCEDURE — 3080F DIAST BP >= 90 MM HG: CPT | Mod: CPTII,S$GLB,, | Performed by: INTERNAL MEDICINE

## 2019-10-25 PROCEDURE — 99999 PR PBB SHADOW E&M-EST. PATIENT-LVL IV: ICD-10-PCS | Mod: PBBFAC,,, | Performed by: INTERNAL MEDICINE

## 2019-10-25 PROCEDURE — 77080 DXA BONE DENSITY AXIAL: CPT | Mod: TC

## 2019-10-25 PROCEDURE — 3080F PR MOST RECENT DIASTOLIC BLOOD PRESSURE >= 90 MM HG: ICD-10-PCS | Mod: CPTII,S$GLB,, | Performed by: INTERNAL MEDICINE

## 2019-10-25 PROCEDURE — 3008F PR BODY MASS INDEX (BMI) DOCUMENTED: ICD-10-PCS | Mod: CPTII,S$GLB,, | Performed by: INTERNAL MEDICINE

## 2019-10-25 PROCEDURE — 99204 PR OFFICE/OUTPT VISIT, NEW, LEVL IV, 45-59 MIN: ICD-10-PCS | Mod: 25,S$GLB,, | Performed by: INTERNAL MEDICINE

## 2019-10-25 PROCEDURE — 90471 IMMUNIZATION ADMIN: CPT | Mod: S$GLB,,, | Performed by: INTERNAL MEDICINE

## 2019-10-25 PROCEDURE — 90472 IMMUNIZATION ADMIN EACH ADD: CPT | Mod: S$GLB,,, | Performed by: INTERNAL MEDICINE

## 2019-10-25 PROCEDURE — 99999 PR PBB SHADOW E&M-EST. PATIENT-LVL IV: CPT | Mod: PBBFAC,,, | Performed by: INTERNAL MEDICINE

## 2019-10-25 PROCEDURE — 3008F BODY MASS INDEX DOCD: CPT | Mod: CPTII,S$GLB,, | Performed by: INTERNAL MEDICINE

## 2019-10-25 PROCEDURE — 90472 PNEUMOCOCCAL CONJUGATE VACCINE 13-VALENT LESS THAN 5YO & GREATER THAN: ICD-10-PCS | Mod: S$GLB,,, | Performed by: INTERNAL MEDICINE

## 2019-10-25 PROCEDURE — 90670 PCV13 VACCINE IM: CPT | Mod: S$GLB,,, | Performed by: INTERNAL MEDICINE

## 2019-10-25 PROCEDURE — 90471 HEPATITIS A VACCINE ADULT IM: ICD-10-PCS | Mod: S$GLB,,, | Performed by: INTERNAL MEDICINE
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
## 2019-10-25 NOTE — PATIENT INSTRUCTIONS
Today:  - Hepatitis A #1  - Prevnar (Pneumonia #1)  - Shingrix #1 (Shingles)    2 months:  - Pneumovax (Pneumonia #2)  - Shingrix #2 (Shingles)    6 months:  - Hepatitis A #2

## 2019-10-25 NOTE — LETTER
October 25, 2019      Donna Valdez MD  [address]  [facility] [address]           [facility] - Infectious [facility]  [address]  [facility] [address]  Phone: [phone]  Fax: [phone]          Patient: Kendra Griffin   MR Number: [id number]   YOB: 1960   Date of Visit: 10/25/2019       Dear Dr. Donna Valdez:    Thank you for referring Kendra Griffin to me for evaluation. Attached you will find relevant portions of my assessment and plan of care.    If you have questions, please do not hesitate to call me. I look forward to following Kendra Griffin along with you.    Sincerely,    Gale Ramos MD    Enclosure  CC:  No Recipients    If you would like to receive this communication electronically, please contact [email or web address] or [phone] to request more information on [employer] Link access.    For providers and/or their staff who would like to refer a patient to [facility], please contact us through our one-stop-shop provider referral line, [facility], at [phone].    If you feel you have received this communication in error or would no longer like to receive these types of communications, please e-mail [email or web address]

## 2019-10-27 DIAGNOSIS — M05.79 RHEUMATOID ARTHRITIS INVOLVING MULTIPLE SITES WITH POSITIVE RHEUMATOID FACTOR: ICD-10-CM

## 2019-10-27 DIAGNOSIS — G89.29 CHRONIC BILATERAL LOW BACK PAIN WITHOUT SCIATICA: Chronic | ICD-10-CM

## 2019-10-27 DIAGNOSIS — M54.50 CHRONIC BILATERAL LOW BACK PAIN WITHOUT SCIATICA: Chronic | ICD-10-CM

## 2019-10-27 DIAGNOSIS — E78.00 PURE HYPERCHOLESTEROLEMIA: Chronic | ICD-10-CM

## 2019-10-27 DIAGNOSIS — E66.3 OVERWEIGHT (BMI 25.0-29.9): ICD-10-CM

## 2019-10-27 DIAGNOSIS — I10 ESSENTIAL (PRIMARY) HYPERTENSION: Chronic | ICD-10-CM

## 2019-10-27 DIAGNOSIS — K21.9 GERD WITHOUT ESOPHAGITIS: Chronic | ICD-10-CM

## 2019-10-28 ENCOUNTER — IMMUNIZATION (OUTPATIENT)
Dept: PHARMACY | Facility: CLINIC | Age: 59
End: 2019-10-28
Payer: OTHER GOVERNMENT

## 2019-10-28 RX ORDER — PREDNISONE 5 MG/1
TABLET ORAL
Qty: 60 TABLET | Refills: 1 | Status: SHIPPED | OUTPATIENT
Start: 2019-10-28 | End: 2019-12-30

## 2019-10-28 NOTE — TELEPHONE ENCOUNTER
Staff to schedule follow-up with patient in 1 month.  We were instructed to wait 1 month after live vaccination before immunosuppression.

## 2019-10-30 ENCOUNTER — PATIENT MESSAGE (OUTPATIENT)
Dept: RHEUMATOLOGY | Facility: CLINIC | Age: 59
End: 2019-10-30

## 2019-10-31 NOTE — TELEPHONE ENCOUNTER
Patient unable to tolerate prednisone 10 mg will instruct her to break the tab in half instead and send in update on Monday.

## 2019-11-07 ENCOUNTER — PATIENT MESSAGE (OUTPATIENT)
Dept: RHEUMATOLOGY | Facility: CLINIC | Age: 59
End: 2019-11-07

## 2019-11-07 NOTE — PROGRESS NOTES
Digital Medicine Program Enrollment  HPI   Several attempts have been made to complete the enrollment process and all; attempts have been unsuccessful.  Pt has been discharged and letter has been sent.

## 2019-11-27 ENCOUNTER — PATIENT MESSAGE (OUTPATIENT)
Dept: RHEUMATOLOGY | Facility: CLINIC | Age: 59
End: 2019-11-27

## 2019-12-10 ENCOUNTER — PATIENT OUTREACH (OUTPATIENT)
Dept: ADMINISTRATIVE | Facility: OTHER | Age: 59
End: 2019-12-10

## 2019-12-10 DIAGNOSIS — Z12.11 ENCOUNTER FOR FIT (FECAL IMMUNOCHEMICAL TEST) SCREENING: Primary | ICD-10-CM

## 2019-12-11 ENCOUNTER — OFFICE VISIT (OUTPATIENT)
Dept: RHEUMATOLOGY | Facility: CLINIC | Age: 59
End: 2019-12-11
Payer: COMMERCIAL

## 2019-12-11 VITALS
SYSTOLIC BLOOD PRESSURE: 148 MMHG | BODY MASS INDEX: 31.04 KG/M2 | HEART RATE: 88 BPM | WEIGHT: 204.81 LBS | DIASTOLIC BLOOD PRESSURE: 93 MMHG | HEIGHT: 68 IN

## 2019-12-11 DIAGNOSIS — Z79.631 METHOTREXATE, LONG TERM, CURRENT USE: ICD-10-CM

## 2019-12-11 DIAGNOSIS — Z79.52 CURRENT USE OF STEROID MEDICATION: ICD-10-CM

## 2019-12-11 DIAGNOSIS — M05.79 RHEUMATOID ARTHRITIS INVOLVING MULTIPLE SITES WITH POSITIVE RHEUMATOID FACTOR: Primary | ICD-10-CM

## 2019-12-11 PROCEDURE — 3080F DIAST BP >= 90 MM HG: CPT | Mod: CPTII,S$GLB,, | Performed by: INTERNAL MEDICINE

## 2019-12-11 PROCEDURE — 99214 OFFICE O/P EST MOD 30 MIN: CPT | Mod: S$GLB,,, | Performed by: INTERNAL MEDICINE

## 2019-12-11 PROCEDURE — 3077F PR MOST RECENT SYSTOLIC BLOOD PRESSURE >= 140 MM HG: ICD-10-PCS | Mod: CPTII,S$GLB,, | Performed by: INTERNAL MEDICINE

## 2019-12-11 PROCEDURE — 3008F PR BODY MASS INDEX (BMI) DOCUMENTED: ICD-10-PCS | Mod: CPTII,S$GLB,, | Performed by: INTERNAL MEDICINE

## 2019-12-11 PROCEDURE — 99999 PR PBB SHADOW E&M-EST. PATIENT-LVL III: CPT | Mod: PBBFAC,,, | Performed by: INTERNAL MEDICINE

## 2019-12-11 PROCEDURE — 3008F BODY MASS INDEX DOCD: CPT | Mod: CPTII,S$GLB,, | Performed by: INTERNAL MEDICINE

## 2019-12-11 PROCEDURE — 99999 PR PBB SHADOW E&M-EST. PATIENT-LVL III: ICD-10-PCS | Mod: PBBFAC,,, | Performed by: INTERNAL MEDICINE

## 2019-12-11 PROCEDURE — 3077F SYST BP >= 140 MM HG: CPT | Mod: CPTII,S$GLB,, | Performed by: INTERNAL MEDICINE

## 2019-12-11 PROCEDURE — 99214 PR OFFICE/OUTPT VISIT, EST, LEVL IV, 30-39 MIN: ICD-10-PCS | Mod: S$GLB,,, | Performed by: INTERNAL MEDICINE

## 2019-12-11 PROCEDURE — 3080F PR MOST RECENT DIASTOLIC BLOOD PRESSURE >= 90 MM HG: ICD-10-PCS | Mod: CPTII,S$GLB,, | Performed by: INTERNAL MEDICINE

## 2019-12-11 ASSESSMENT — ROUTINE ASSESSMENT OF PATIENT INDEX DATA (RAPID3)
AM STIFFNESS SCORE: 1, YES
PAIN SCORE: 4.5
MDHAQ FUNCTION SCORE: .4
PSYCHOLOGICAL DISTRESS SCORE: 5.5
PATIENT GLOBAL ASSESSMENT SCORE: 4
TOTAL RAPID3 SCORE: 3.28
FATIGUE SCORE: 4

## 2019-12-11 NOTE — PROGRESS NOTES
"Subjective:       Patient ID: Kendra Griffin is a 59 y.o. female.    Chief Complaint: Joint pain    HPI:  Kendra Griffin is a 59 y.o. female with HTN, elevated cholesterol, anemia, and GERD sent in consultation by Dr. Armando Knox for RA.  Since July 2019 had multiple joint pain and decrease mobility.  RF and CCP were found to be positive.    She has seen ID and is working on vaccinations.  Patient did not start MTX due to concern of side effects.    Hands, shoulders, feet and left knee are painful.   One hour morning stiffness.  8/10 pain worse at night that inhibit sleep.  Joints warm and feels feverish and chills at times.  Significant fatigue and unable to work as .  Hair loss started at same time with generalized thinning.   History back pain.          Lupus Review of Systems  Alopecia: yes  Photosensitivity: no   Raynaud's: no  Oral or nasal ulcers: no  Rashes: no  No pleurisy or pericarditis.  No seizures, psychosis, or stroke.  No venous or arterial clots.  Pregnancy hx (if applicable): no miscarriages         Review of Systems   Constitutional: Positive for unexpected weight change (gained 10 pounds on prednisone). Negative for fever.   HENT: Negative for trouble swallowing.         Dry mouth   Eyes: Negative for redness.        Dry eye   Respiratory: Negative for cough and shortness of breath.    Cardiovascular: Negative for chest pain.   Gastrointestinal: Negative for constipation and diarrhea.   Genitourinary: Negative for dysuria and genital sores.   Musculoskeletal: Positive for arthralgias.   Skin: Negative for rash.   Neurological: Negative for headaches.   Hematological: Does not bruise/bleed easily.         Objective:   BP (!) 148/93 (BP Location: Right arm, Patient Position: Sitting, BP Method: Large (Automatic))   Pulse 88   Ht 5' 7.5" (1.715 m)   Wt 92.9 kg (204 lb 12.9 oz)   BMI 31.60 kg/m²      Physical Exam   Constitutional: She is oriented to person, place, and time and " well-developed, well-nourished, and in no distress.   HENT:   Head: Normocephalic and atraumatic.   Eyes: Conjunctivae and EOM are normal.   Neck: Neck supple.   Cardiovascular: Normal rate, regular rhythm and normal heart sounds.    Pulmonary/Chest: Effort normal and breath sounds normal.   Abdominal: Soft. Bowel sounds are normal.   Neurological: She is alert and oriented to person, place, and time. Gait normal.   Skin: Skin is warm and dry.     Psychiatric: Mood and affect normal.   Musculoskeletal:   28 joint count: 2 swollen bilateral 2nd mcp and 6 tender (1-3rd Mcp bilateral)  Decreased ROM wrists  Pain compression MTPs            LABS  Component      Latest Ref Rng & Units 9/5/2019   Anti Sm Antibody      0.00 - 19.99 EU 1.68   Anti-Sm Interpretation      Negative Negative   Anti-SSA Antibody      0.00 - 19.99 EU 1.89   Anti-SSA Interpretation      Negative Negative   Anti-SSB Antibody      0.00 - 19.99 EU 0.10   Anti-SSB Interpretation      Negative Negative   ds DNA Ab      Negative 1:10 Negative 1:10   Anti Sm/RNP Antibody      0.00 - 19.99 EU 0.84   Anti-Sm/RNP Interpretation      Negative Negative   Rheumatoid Factor      0.0 - 15.0 IU/mL 320.0 (H)   Sed Rate      0 - 36 mm/Hr 35   CRP      0.0 - 8.2 mg/L 8.7 (H)   SKYLA Screen      Negative <1:160 Positive (A)   CCP Antibodies      <5.0 U/mL 125.9 (H)   SKYLA HEP-2 Titer       Positive 1:320 Homogeneous     X-ray hands and knees 9/5/19 with mild DJD    Component      Latest Ref Rng & Units 10/3/2019   Specimen UA       Urine, Unspecified   Color, UA      Yellow, Straw, Pam Yellow   Appearance, UA      Clear Clear   pH, UA      5.0 - 8.0 6.0   Specific Gravity, UA      1.005 - 1.030 1.020   Protein, UA      Negative Negative   Glucose, UA      Negative Negative   Ketones, UA      Negative Negative   Bilirubin (UA)      Negative Negative   Occult Blood UA      Negative 1+ (A)   NITRITE UA      Negative Negative   Leukocytes, UA      Negative Negative    NIL      See text IU/mL 0.030   TB1 - Nil      See text IU/mL 0.000   TB2 - Nil      See text IU/mL 0.000   Mitogen - Nil      See text IU/mL 8.100   TB Gold Plus       Negative   RBC, UA      0 - 4 /hpf 5 (H)   WBC, UA      0 - 5 /hpf 2   Bacteria, UA      None-Occ /hpf Occasional   Squam Epithel, UA      /hpf 0   Microscopic Comment       SEE COMMENT   Varicella IgG      0.00 - 0.90 ISR 3.37 (H)   Varicella Interpretation      Negative Positive (A)   HIV 1/2 Ag/Ab      Negative Negative   Hepatitis B Surface Ag      Negative Negative   Hep B Core Total Ab       Negative   Hep B S Ab       Positive (A)   Hepatitis C Ab      Negative Negative   Hepatitis A Antibody IgG       Negative   Strongyloides Ab IgG      Negative Negative   RPR      Non-reactive Non-reactive       Assessment:       1.  Rheumatoid arthritis.  Manifested by joint pain, positive RF/CCP/SKYLA, elevated CRP.  Active  2.  Hypertension.  Took BP med today  3.  Hypercholesterolemia  4.  Anemia  5.  GERD with esophagitis  6.  Chronic back pain   7.  History of murmur.  Evaluated by cardiology and no significant issue found.  8.  Abnormal stress echo. Follow with primary.       Plan:       1.  Encouraged her to start MTX 10 mg qweek with folic acid.  2.  Labs today  3.  Follow with ID for vaccination  4.  Continue prednisone 10 mg daily.  Continue Omeprazole daily  5.  Calcium 1,000 mg daily and vitamin D 1,000 units daily  6.  RTO in 3 months

## 2019-12-12 ENCOUNTER — PATIENT MESSAGE (OUTPATIENT)
Dept: RHEUMATOLOGY | Facility: CLINIC | Age: 59
End: 2019-12-12

## 2019-12-26 ENCOUNTER — CLINICAL SUPPORT (OUTPATIENT)
Dept: INFECTIOUS DISEASES | Facility: CLINIC | Age: 59
End: 2019-12-26
Payer: COMMERCIAL

## 2019-12-26 PROCEDURE — 90732 PPSV23 VACC 2 YRS+ SUBQ/IM: CPT | Mod: S$GLB,,, | Performed by: INTERNAL MEDICINE

## 2019-12-26 PROCEDURE — 90472 ZOSTER RECOMBINANT VACCINE: ICD-10-PCS | Mod: S$GLB,,, | Performed by: INTERNAL MEDICINE

## 2019-12-26 PROCEDURE — 90472 IMMUNIZATION ADMIN EACH ADD: CPT | Mod: S$GLB,,, | Performed by: INTERNAL MEDICINE

## 2019-12-26 PROCEDURE — 90750 HZV VACC RECOMBINANT IM: CPT | Mod: S$GLB,,, | Performed by: INTERNAL MEDICINE

## 2019-12-26 PROCEDURE — 90471 IMMUNIZATION ADMIN: CPT | Mod: S$GLB,,, | Performed by: INTERNAL MEDICINE

## 2019-12-26 PROCEDURE — 90732 PNEUMOCOCCAL POLYSACCHARIDE VACCINE 23-VALENT =>2YO SQ IM: ICD-10-PCS | Mod: S$GLB,,, | Performed by: INTERNAL MEDICINE

## 2019-12-26 PROCEDURE — 90750 ZOSTER RECOMBINANT VACCINE: ICD-10-PCS | Mod: S$GLB,,, | Performed by: INTERNAL MEDICINE

## 2019-12-26 PROCEDURE — 90471 PNEUMOCOCCAL POLYSACCHARIDE VACCINE 23-VALENT =>2YO SQ IM: ICD-10-PCS | Mod: S$GLB,,, | Performed by: INTERNAL MEDICINE

## 2019-12-29 DIAGNOSIS — E66.3 OVERWEIGHT (BMI 25.0-29.9): ICD-10-CM

## 2019-12-29 DIAGNOSIS — Z79.52 CURRENT USE OF STEROID MEDICATION: ICD-10-CM

## 2019-12-29 DIAGNOSIS — G89.29 CHRONIC BILATERAL LOW BACK PAIN WITHOUT SCIATICA: Chronic | ICD-10-CM

## 2019-12-29 DIAGNOSIS — E78.00 PURE HYPERCHOLESTEROLEMIA: Chronic | ICD-10-CM

## 2019-12-29 DIAGNOSIS — M54.50 CHRONIC BILATERAL LOW BACK PAIN WITHOUT SCIATICA: Chronic | ICD-10-CM

## 2019-12-29 DIAGNOSIS — I10 ESSENTIAL (PRIMARY) HYPERTENSION: Chronic | ICD-10-CM

## 2019-12-29 DIAGNOSIS — M05.79 RHEUMATOID ARTHRITIS INVOLVING MULTIPLE SITES WITH POSITIVE RHEUMATOID FACTOR: ICD-10-CM

## 2019-12-29 DIAGNOSIS — K21.9 GERD WITHOUT ESOPHAGITIS: Chronic | ICD-10-CM

## 2019-12-30 RX ORDER — PREDNISONE 5 MG/1
TABLET ORAL
Qty: 60 TABLET | Refills: 1 | Status: SHIPPED | OUTPATIENT
Start: 2019-12-30 | End: 2020-02-28

## 2019-12-30 RX ORDER — PANTOPRAZOLE SODIUM 20 MG/1
TABLET, DELAYED RELEASE ORAL
Qty: 30 TABLET | Refills: 1 | Status: SHIPPED | OUTPATIENT
Start: 2019-12-30 | End: 2020-02-28

## 2020-01-03 DIAGNOSIS — Z12.11 COLON CANCER SCREENING: ICD-10-CM

## 2020-01-09 ENCOUNTER — LAB VISIT (OUTPATIENT)
Dept: LAB | Facility: HOSPITAL | Age: 60
End: 2020-01-09
Attending: INTERNAL MEDICINE
Payer: COMMERCIAL

## 2020-01-09 DIAGNOSIS — Z12.11 COLON CANCER SCREENING: ICD-10-CM

## 2020-01-09 PROCEDURE — 82274 ASSAY TEST FOR BLOOD FECAL: CPT

## 2020-01-15 ENCOUNTER — PATIENT OUTREACH (OUTPATIENT)
Dept: ADMINISTRATIVE | Facility: HOSPITAL | Age: 60
End: 2020-01-15

## 2020-01-16 LAB — HEMOCCULT STL QL IA: NEGATIVE

## 2020-01-16 NOTE — PROGRESS NOTES
Your stool results are normal.  Please continue your current medications and doses.  Please feel free to contact me with any questions or concerns.    Sincerely,  Armando Knox  [email or web address]

## 2020-02-28 DIAGNOSIS — I10 ESSENTIAL (PRIMARY) HYPERTENSION: Chronic | ICD-10-CM

## 2020-02-28 DIAGNOSIS — E66.3 OVERWEIGHT (BMI 25.0-29.9): ICD-10-CM

## 2020-02-28 DIAGNOSIS — Z79.631 METHOTREXATE, LONG TERM, CURRENT USE: ICD-10-CM

## 2020-02-28 DIAGNOSIS — K21.9 GERD WITHOUT ESOPHAGITIS: Chronic | ICD-10-CM

## 2020-02-28 DIAGNOSIS — Z79.52 CURRENT USE OF STEROID MEDICATION: ICD-10-CM

## 2020-02-28 DIAGNOSIS — M05.79 RHEUMATOID ARTHRITIS INVOLVING MULTIPLE SITES WITH POSITIVE RHEUMATOID FACTOR: ICD-10-CM

## 2020-02-28 DIAGNOSIS — E78.00 PURE HYPERCHOLESTEROLEMIA: Chronic | ICD-10-CM

## 2020-02-28 DIAGNOSIS — M54.50 CHRONIC BILATERAL LOW BACK PAIN WITHOUT SCIATICA: Chronic | ICD-10-CM

## 2020-02-28 DIAGNOSIS — G89.29 CHRONIC BILATERAL LOW BACK PAIN WITHOUT SCIATICA: Chronic | ICD-10-CM

## 2020-02-28 RX ORDER — FOLIC ACID 1 MG/1
TABLET ORAL
Qty: 90 TABLET | Refills: 0 | Status: SHIPPED | OUTPATIENT
Start: 2020-02-28 | End: 2020-05-19 | Stop reason: SDUPTHER

## 2020-02-28 RX ORDER — PREDNISONE 5 MG/1
TABLET ORAL
Qty: 60 TABLET | Refills: 0 | Status: SHIPPED | OUTPATIENT
Start: 2020-02-28 | End: 2020-03-17

## 2020-02-28 RX ORDER — PANTOPRAZOLE SODIUM 20 MG/1
TABLET, DELAYED RELEASE ORAL
Qty: 90 TABLET | Refills: 0 | Status: SHIPPED | OUTPATIENT
Start: 2020-02-28 | End: 2020-05-19 | Stop reason: SDUPTHER

## 2020-03-14 ENCOUNTER — PATIENT MESSAGE (OUTPATIENT)
Dept: RHEUMATOLOGY | Facility: CLINIC | Age: 60
End: 2020-03-14

## 2020-03-16 ENCOUNTER — PATIENT MESSAGE (OUTPATIENT)
Dept: RHEUMATOLOGY | Facility: CLINIC | Age: 60
End: 2020-03-16

## 2020-03-17 ENCOUNTER — TELEPHONE (OUTPATIENT)
Dept: RHEUMATOLOGY | Facility: CLINIC | Age: 60
End: 2020-03-17

## 2020-03-17 ENCOUNTER — OFFICE VISIT (OUTPATIENT)
Dept: RHEUMATOLOGY | Facility: CLINIC | Age: 60
End: 2020-03-17
Payer: COMMERCIAL

## 2020-03-17 ENCOUNTER — PATIENT MESSAGE (OUTPATIENT)
Dept: RHEUMATOLOGY | Facility: CLINIC | Age: 60
End: 2020-03-17

## 2020-03-17 DIAGNOSIS — Z79.631 METHOTREXATE, LONG TERM, CURRENT USE: ICD-10-CM

## 2020-03-17 DIAGNOSIS — M05.79 RHEUMATOID ARTHRITIS INVOLVING MULTIPLE SITES WITH POSITIVE RHEUMATOID FACTOR: Primary | ICD-10-CM

## 2020-03-17 DIAGNOSIS — E66.3 OVERWEIGHT (BMI 25.0-29.9): ICD-10-CM

## 2020-03-17 DIAGNOSIS — Z79.52 CURRENT USE OF STEROID MEDICATION: ICD-10-CM

## 2020-03-17 PROCEDURE — 99214 OFFICE O/P EST MOD 30 MIN: CPT | Mod: 95,,, | Performed by: INTERNAL MEDICINE

## 2020-03-17 PROCEDURE — 99214 PR OFFICE/OUTPT VISIT, EST, LEVL IV, 30-39 MIN: ICD-10-PCS | Mod: 95,,, | Performed by: INTERNAL MEDICINE

## 2020-03-17 RX ORDER — PREDNISONE 2.5 MG/1
TABLET ORAL
Qty: 60 TABLET | Refills: 2 | Status: SHIPPED | OUTPATIENT
Start: 2020-03-17 | End: 2020-06-03

## 2020-03-17 NOTE — PROGRESS NOTES
Chief Complaint: Joint pain    HPI:  Kendra Griffin is a 59 y.o. female with HTN, elevated cholesterol, anemia, and GERD sent in consultation by Dr. Armando Knox for RA.  Since July 2019 had multiple joint pain and decrease mobility.  RF and CCP were found to be positive.    She has seen ID and completed vaccinations except for one more vaccination due April 2020.  She is currently on prednisone 5 mg for the last month.    She does not like the side effects     Patient is taking 4 tabs MTX once a week on Friday.    Hands, shoulders, feet and left knee are painful.   One hour morning stiffness.    Sister currently has fever and went to hospital but they would not test her.  She was with her sister last week.      8/10 pain worse at night that inhibit sleep.  Joints warm and feels feverish and chills at times.  Significant fatigue and unable to work as .  Hair loss started at same time with generalized thinning.   History back pain.          Lupus Review of Systems  Alopecia: yes  Photosensitivity: no   Raynaud's: no  Oral or nasal ulcers: no  Rashes: no  No pleurisy or pericarditis.  No seizures, psychosis, or stroke.  No venous or arterial clots.  Pregnancy hx (if applicable): no miscarriages         Review of Systems   Constitutional: Positive for unexpected weight change (gaining weight). Negative for fever.   HENT: Negative for trouble swallowing.         Dry mouth   Eyes: Negative for redness.        Dry eye   Respiratory: Negative for cough and shortness of breath.    Cardiovascular: Negative for chest pain.   Gastrointestinal: Negative for constipation and diarrhea.   Genitourinary: Negative for dysuria and genital sores.   Musculoskeletal: Positive for arthralgias.   Skin: Negative for rash.   Neurological: Negative for headaches.   Hematological: Does not bruise/bleed easily.         Objective:   Patient seen via telemedicine     Physical Exam   Constitutional: She is oriented to person, place,  and time and well-developed, well-nourished, and in no distress.   HENT:   Head: Normocephalic and atraumatic.   Eyes: Conjunctivae and EOM are normal.   Neurological: She is alert and oriented to person, place, and time.   Psychiatric: Mood and affect normal.   Musculoskeletal:   Unable to do 28 joint count   Can see on camera that hands are swollen at MCPs.             LABS  Component      Latest Ref Rng & Units 1/16/2020 12/11/2019   WBC      3.90 - 12.70 K/uL  8.30   RBC      4.00 - 5.40 M/uL  5.01   Hemoglobin      12.0 - 16.0 g/dL  11.0 (L)   Hematocrit      37.0 - 48.5 %  38.2   MCV      82 - 98 fL  76 (L)   MCH      27.0 - 31.0 pg  22.0 (L)   MCHC      32.0 - 36.0 g/dL  28.8 (L)   RDW      11.5 - 14.5 %  18.0 (H)   Platelets      150 - 350 K/uL  378 (H)   MPV      9.2 - 12.9 fL  9.4   Immature Granulocytes      0.0 - 0.5 %  0.5   Gran # (ANC)      1.8 - 7.7 K/uL  7.0   Immature Grans (Abs)      0.00 - 0.04 K/uL  0.04   Lymph #      1.0 - 4.8 K/uL  1.0   Mono #      0.3 - 1.0 K/uL  0.2 (L)   Eos #      0.0 - 0.5 K/uL  0.0   Baso #      0.00 - 0.20 K/uL  0.03   nRBC      0 /100 WBC  0   Gran%      38.0 - 73.0 %  84.8 (H)   Lymph%      18.0 - 48.0 %  11.4 (L)   Mono%      4.0 - 15.0 %  2.7 (L)   Eosinophil%      0.0 - 8.0 %  0.2   Basophil%      0.0 - 1.9 %  0.4   Differential Method        Automated   Sodium      136 - 145 mmol/L  142   Potassium      3.5 - 5.1 mmol/L  4.3   Chloride      95 - 110 mmol/L  104   CO2      23 - 29 mmol/L  28   Glucose      70 - 110 mg/dL  100   BUN, Bld      6 - 20 mg/dL  13   Creatinine      0.5 - 1.4 mg/dL  0.9   Calcium      8.7 - 10.5 mg/dL  10.2   PROTEIN TOTAL      6.0 - 8.4 g/dL  8.0   Albumin      3.5 - 5.2 g/dL  3.9   BILIRUBIN TOTAL      0.1 - 1.0 mg/dL  0.3   Alkaline Phosphatase      55 - 135 U/L  100   AST      10 - 40 U/L  15   ALT      10 - 44 U/L  15   Anion Gap      8 - 16 mmol/L  10   eGFR if African American      >60 mL/min/1.73 m:2  >60.0   eGFR if non African  American      >60 mL/min/1.73 m:2  >60.0   Sed Rate      0 - 36 mm/Hr  50 (H)   CRP      0.0 - 8.2 mg/L  9.4 (H)   Fecal Immunochemical Test (iFOBT)      Negative Negative            Assessment:       1.  Rheumatoid arthritis.  Manifested by joint pain, positive RF/CCP/SKYLA, elevated CRP.  Active  2.  Hypertension.  Took BP med today  3.  Hypercholesterolemia  4.  Anemia  5.  GERD with esophagitis  6.  Chronic back pain   7.  History of murmur.  Evaluated by cardiology and no significant issue found.  8.  Abnormal stress echo. Follow with primary.       Plan:       1.  If labs allow increase MTX to 15 mg qweek with folic acid.  2.  Labs tomorrow [place] 10 am at Dr. Knox first floor.  3.  Follow with ID for vaccination  4.  Patient to decrease prednisone 2.5 mg daily .  Continue Omeprazole daily  5.  Calcium 1,000 mg daily and vitamin D 1,000 units daily  6.  RTO in 3 months    The patient location is: at her home  The chief complaint leading to consultation is: Rheumatoid Arthritis  Visit type: Virtual visit with synchronous audio and video  Total time spent with patient: 20 minutes  Each patient to whom he or she provides medical services by telemedicine is:  (1) informed of the relationship between the physician and patient and the respective role of any other health care provider with respect to management of the patient; and (2) notified that he or she may decline to receive medical services by telemedicine and may withdraw from such care at any time.    Notes: Please see above note for details.  Visit was extended since patient had to be called three time during the time of visit since she was unable to log on.    Answers for HPI/ROS submitted by the patient on 3/17/2020   fever: No  eye redness: No  headaches: No  shortness of breath: No  chest pain: No  trouble swallowing: No  diarrhea: No  constipation: No  unexpected weight change: No  genital sore: No  dysuria: No  During the last 3 days, have you had a  skin rash?: No  Bruises or bleeds easily: No  cough: No

## 2020-03-18 ENCOUNTER — PATIENT MESSAGE (OUTPATIENT)
Dept: RHEUMATOLOGY | Facility: CLINIC | Age: 60
End: 2020-03-18

## 2020-03-18 ENCOUNTER — LAB VISIT (OUTPATIENT)
Dept: LAB | Facility: HOSPITAL | Age: 60
End: 2020-03-18
Attending: INTERNAL MEDICINE
Payer: COMMERCIAL

## 2020-03-18 DIAGNOSIS — M05.79 RHEUMATOID ARTHRITIS INVOLVING MULTIPLE SITES WITH POSITIVE RHEUMATOID FACTOR: ICD-10-CM

## 2020-03-18 DIAGNOSIS — Z79.631 METHOTREXATE, LONG TERM, CURRENT USE: ICD-10-CM

## 2020-03-18 DIAGNOSIS — E66.3 OVERWEIGHT (BMI 25.0-29.9): ICD-10-CM

## 2020-03-18 DIAGNOSIS — Z79.52 CURRENT USE OF STEROID MEDICATION: ICD-10-CM

## 2020-03-18 LAB
ALBUMIN SERPL BCP-MCNC: 4.4 G/DL (ref 3.5–5.2)
ALP SERPL-CCNC: 88 U/L (ref 38–126)
ALT SERPL W/O P-5'-P-CCNC: 15 U/L (ref 10–44)
ANION GAP SERPL CALC-SCNC: 7 MMOL/L (ref 8–16)
AST SERPL-CCNC: 24 U/L (ref 15–46)
BASOPHILS # BLD AUTO: 0.05 K/UL (ref 0–0.2)
BASOPHILS NFR BLD: 0.6 % (ref 0–1.9)
BILIRUB SERPL-MCNC: 0.4 MG/DL (ref 0.1–1)
BUN SERPL-MCNC: 11 MG/DL (ref 7–17)
CALCIUM SERPL-MCNC: 9.7 MG/DL (ref 8.7–10.5)
CHLORIDE SERPL-SCNC: 103 MMOL/L (ref 95–110)
CO2 SERPL-SCNC: 30 MMOL/L (ref 23–29)
CREAT SERPL-MCNC: 0.72 MG/DL (ref 0.5–1.4)
CRP SERPL-MCNC: 0.38 MG/DL (ref 0–1)
DIFFERENTIAL METHOD: ABNORMAL
EOSINOPHIL # BLD AUTO: 0.2 K/UL (ref 0–0.5)
EOSINOPHIL NFR BLD: 2 % (ref 0–8)
ERYTHROCYTE [DISTWIDTH] IN BLOOD BY AUTOMATED COUNT: 17.4 % (ref 11.5–14.5)
ERYTHROCYTE [SEDIMENTATION RATE] IN BLOOD BY WESTERGREN METHOD: 19 MM/HR (ref 0–20)
EST. GFR  (AFRICAN AMERICAN): >60 ML/MIN/1.73 M^2
EST. GFR  (NON AFRICAN AMERICAN): >60 ML/MIN/1.73 M^2
GLUCOSE SERPL-MCNC: 123 MG/DL (ref 70–110)
HCT VFR BLD AUTO: 38.7 % (ref 37–48.5)
HGB BLD-MCNC: 11.1 G/DL (ref 12–16)
IMM GRANULOCYTES # BLD AUTO: 0.02 K/UL (ref 0–0.04)
IMM GRANULOCYTES NFR BLD AUTO: 0.2 % (ref 0–0.5)
LYMPHOCYTES # BLD AUTO: 1 K/UL (ref 1–4.8)
LYMPHOCYTES NFR BLD: 12.3 % (ref 18–48)
MCH RBC QN AUTO: 21.9 PG (ref 27–31)
MCHC RBC AUTO-ENTMCNC: 28.7 G/DL (ref 32–36)
MCV RBC AUTO: 76 FL (ref 82–98)
MONOCYTES # BLD AUTO: 0.4 K/UL (ref 0.3–1)
MONOCYTES NFR BLD: 4.3 % (ref 4–15)
NEUTROPHILS # BLD AUTO: 6.5 K/UL (ref 1.8–7.7)
NEUTROPHILS NFR BLD: 80.6 % (ref 38–73)
NRBC BLD-RTO: 0 /100 WBC
PLATELET # BLD AUTO: 332 K/UL (ref 150–350)
PMV BLD AUTO: 9.6 FL (ref 9.2–12.9)
POTASSIUM SERPL-SCNC: 4 MMOL/L (ref 3.5–5.1)
PROT SERPL-MCNC: 7.6 G/DL (ref 6–8.4)
RBC # BLD AUTO: 5.07 M/UL (ref 4–5.4)
SODIUM SERPL-SCNC: 140 MMOL/L (ref 136–145)
WBC # BLD AUTO: 8.11 K/UL (ref 3.9–12.7)

## 2020-03-18 PROCEDURE — 85025 COMPLETE CBC W/AUTO DIFF WBC: CPT | Mod: PO

## 2020-03-18 PROCEDURE — 85652 RBC SED RATE AUTOMATED: CPT

## 2020-03-18 PROCEDURE — 80053 COMPREHEN METABOLIC PANEL: CPT | Mod: PO

## 2020-03-18 PROCEDURE — 86140 C-REACTIVE PROTEIN: CPT | Mod: PO

## 2020-03-18 PROCEDURE — 36415 COLL VENOUS BLD VENIPUNCTURE: CPT | Mod: PO

## 2020-03-19 ENCOUNTER — PATIENT MESSAGE (OUTPATIENT)
Dept: RHEUMATOLOGY | Facility: CLINIC | Age: 60
End: 2020-03-19

## 2020-03-19 DIAGNOSIS — Z79.631 METHOTREXATE, LONG TERM, CURRENT USE: ICD-10-CM

## 2020-03-19 DIAGNOSIS — M05.79 RHEUMATOID ARTHRITIS INVOLVING MULTIPLE SITES WITH POSITIVE RHEUMATOID FACTOR: ICD-10-CM

## 2020-03-19 RX ORDER — METHOTREXATE 2.5 MG/1
15 TABLET ORAL
Qty: 24 TABLET | Refills: 2 | Status: SHIPPED | OUTPATIENT
Start: 2020-03-19 | End: 2020-05-19 | Stop reason: SDUPTHER

## 2020-03-20 ENCOUNTER — PATIENT MESSAGE (OUTPATIENT)
Dept: RHEUMATOLOGY | Facility: CLINIC | Age: 60
End: 2020-03-20

## 2020-03-20 NOTE — TELEPHONE ENCOUNTER
----- Message from Lisa Lynn sent at 4/10/2019 12:22 PM CDT -----  Contact: Self   Type: Patient Call Back    Who called:Self     What is the request in detail: medication causing dizziness    Can the clinic reply by [facility]? YES     Would the patient rather a call back or a response via [facility]? [facility]     Best call back number:[phone]        
Patient states her blood pressure medication was changed on 4/1/19 from Lisinopril to Losartan. Started taking the Losartan on 4/2/19 then started with dizziness a couple days later and is still suffering with dizziness. Asked patient if she has checked her blood pressure and she stated no. Is at work right now but will portal message us her blood pressure reading when she has a chance to take it. Please advise  
Yes

## 2020-05-14 ENCOUNTER — PATIENT MESSAGE (OUTPATIENT)
Dept: RHEUMATOLOGY | Facility: CLINIC | Age: 60
End: 2020-05-14

## 2020-05-15 ENCOUNTER — PATIENT MESSAGE (OUTPATIENT)
Dept: RHEUMATOLOGY | Facility: CLINIC | Age: 60
End: 2020-05-15

## 2020-05-15 DIAGNOSIS — Z79.52 CURRENT USE OF STEROID MEDICATION: ICD-10-CM

## 2020-05-15 DIAGNOSIS — Z79.631 METHOTREXATE, LONG TERM, CURRENT USE: ICD-10-CM

## 2020-05-15 DIAGNOSIS — M05.79 RHEUMATOID ARTHRITIS INVOLVING MULTIPLE SITES WITH POSITIVE RHEUMATOID FACTOR: Primary | ICD-10-CM

## 2020-05-18 ENCOUNTER — LAB VISIT (OUTPATIENT)
Dept: LAB | Facility: HOSPITAL | Age: 60
End: 2020-05-18
Attending: INTERNAL MEDICINE
Payer: COMMERCIAL

## 2020-05-18 DIAGNOSIS — Z79.631 METHOTREXATE, LONG TERM, CURRENT USE: ICD-10-CM

## 2020-05-18 DIAGNOSIS — M05.79 RHEUMATOID ARTHRITIS INVOLVING MULTIPLE SITES WITH POSITIVE RHEUMATOID FACTOR: ICD-10-CM

## 2020-05-18 LAB
ALBUMIN SERPL BCP-MCNC: 4.6 G/DL (ref 3.5–5.2)
ALP SERPL-CCNC: 102 U/L (ref 38–126)
ALT SERPL W/O P-5'-P-CCNC: 23 U/L (ref 10–44)
ANION GAP SERPL CALC-SCNC: 8 MMOL/L (ref 8–16)
AST SERPL-CCNC: 28 U/L (ref 15–46)
BASOPHILS # BLD AUTO: 0.03 K/UL (ref 0–0.2)
BASOPHILS NFR BLD: 0.5 % (ref 0–1.9)
BILIRUB SERPL-MCNC: 0.6 MG/DL (ref 0.1–1)
BUN SERPL-MCNC: 16 MG/DL (ref 7–17)
CALCIUM SERPL-MCNC: 9.9 MG/DL (ref 8.7–10.5)
CHLORIDE SERPL-SCNC: 105 MMOL/L (ref 95–110)
CO2 SERPL-SCNC: 27 MMOL/L (ref 23–29)
CREAT SERPL-MCNC: 0.9 MG/DL (ref 0.5–1.4)
CRP SERPL-MCNC: 0.42 MG/DL (ref 0–1)
DIFFERENTIAL METHOD: ABNORMAL
EOSINOPHIL # BLD AUTO: 0.1 K/UL (ref 0–0.5)
EOSINOPHIL NFR BLD: 1.5 % (ref 0–8)
ERYTHROCYTE [DISTWIDTH] IN BLOOD BY AUTOMATED COUNT: 18.3 % (ref 11.5–14.5)
ERYTHROCYTE [SEDIMENTATION RATE] IN BLOOD BY WESTERGREN METHOD: 26 MM/HR (ref 0–20)
EST. GFR  (AFRICAN AMERICAN): >60 ML/MIN/1.73 M^2
EST. GFR  (NON AFRICAN AMERICAN): >60 ML/MIN/1.73 M^2
GLUCOSE SERPL-MCNC: 89 MG/DL (ref 70–110)
HCT VFR BLD AUTO: 37.6 % (ref 37–48.5)
HGB BLD-MCNC: 10.9 G/DL (ref 12–16)
IMM GRANULOCYTES # BLD AUTO: 0.02 K/UL (ref 0–0.04)
IMM GRANULOCYTES NFR BLD AUTO: 0.3 % (ref 0–0.5)
LYMPHOCYTES # BLD AUTO: 1.2 K/UL (ref 1–4.8)
LYMPHOCYTES NFR BLD: 18 % (ref 18–48)
MCH RBC QN AUTO: 22.5 PG (ref 27–31)
MCHC RBC AUTO-ENTMCNC: 29 G/DL (ref 32–36)
MCV RBC AUTO: 78 FL (ref 82–98)
MONOCYTES # BLD AUTO: 0.4 K/UL (ref 0.3–1)
MONOCYTES NFR BLD: 6.2 % (ref 4–15)
NEUTROPHILS # BLD AUTO: 4.9 K/UL (ref 1.8–7.7)
NEUTROPHILS NFR BLD: 73.5 % (ref 38–73)
NRBC BLD-RTO: 0 /100 WBC
PLATELET # BLD AUTO: 312 K/UL (ref 150–350)
PMV BLD AUTO: 9.4 FL (ref 9.2–12.9)
POTASSIUM SERPL-SCNC: 3.7 MMOL/L (ref 3.5–5.1)
PROT SERPL-MCNC: 7.9 G/DL (ref 6–8.4)
RBC # BLD AUTO: 4.85 M/UL (ref 4–5.4)
SODIUM SERPL-SCNC: 140 MMOL/L (ref 136–145)
WBC # BLD AUTO: 6.66 K/UL (ref 3.9–12.7)

## 2020-05-18 PROCEDURE — 86140 C-REACTIVE PROTEIN: CPT | Mod: PO

## 2020-05-18 PROCEDURE — 85025 COMPLETE CBC W/AUTO DIFF WBC: CPT | Mod: PO

## 2020-05-18 PROCEDURE — 80053 COMPREHEN METABOLIC PANEL: CPT | Mod: PO

## 2020-05-18 PROCEDURE — 36415 COLL VENOUS BLD VENIPUNCTURE: CPT | Mod: PO

## 2020-05-18 PROCEDURE — 85652 RBC SED RATE AUTOMATED: CPT

## 2020-05-19 ENCOUNTER — PATIENT MESSAGE (OUTPATIENT)
Dept: RHEUMATOLOGY | Facility: CLINIC | Age: 60
End: 2020-05-19

## 2020-05-19 DIAGNOSIS — M05.79 RHEUMATOID ARTHRITIS INVOLVING MULTIPLE SITES WITH POSITIVE RHEUMATOID FACTOR: ICD-10-CM

## 2020-05-19 DIAGNOSIS — Z79.52 CURRENT USE OF STEROID MEDICATION: ICD-10-CM

## 2020-05-19 DIAGNOSIS — Z79.631 METHOTREXATE, LONG TERM, CURRENT USE: ICD-10-CM

## 2020-05-19 DIAGNOSIS — K21.9 GERD WITHOUT ESOPHAGITIS: Chronic | ICD-10-CM

## 2020-05-19 RX ORDER — PANTOPRAZOLE SODIUM 20 MG/1
20 TABLET, DELAYED RELEASE ORAL DAILY
Qty: 90 TABLET | Refills: 0 | Status: SHIPPED | OUTPATIENT
Start: 2020-05-19

## 2020-05-19 RX ORDER — METHOTREXATE 2.5 MG/1
15 TABLET ORAL
Qty: 24 TABLET | Refills: 2 | Status: SHIPPED | OUTPATIENT
Start: 2020-05-19 | End: 2020-11-02

## 2020-05-19 RX ORDER — FOLIC ACID 1 MG/1
1000 TABLET ORAL DAILY
Qty: 90 TABLET | Refills: 0 | Status: SHIPPED | OUTPATIENT
Start: 2020-05-19 | End: 2020-08-31

## 2020-05-19 NOTE — TELEPHONE ENCOUNTER
Letter requested for patient to return to work written. Labs were reviewed.  She is taking 1.25 mg prednisone for the last week.  She was instructed to hold prednisone since she is not having much pain.  She will contact office if flare.

## 2020-05-22 ENCOUNTER — PATIENT MESSAGE (OUTPATIENT)
Dept: RHEUMATOLOGY | Facility: CLINIC | Age: 60
End: 2020-05-22

## 2020-05-25 ENCOUNTER — CLINICAL SUPPORT (OUTPATIENT)
Dept: INFECTIOUS DISEASES | Facility: CLINIC | Age: 60
End: 2020-05-25
Payer: COMMERCIAL

## 2020-05-25 PROCEDURE — 90471 HEPATITIS A VACCINE ADULT IM: ICD-10-PCS | Mod: S$GLB,,, | Performed by: INTERNAL MEDICINE

## 2020-05-25 PROCEDURE — 90632 HEPATITIS A VACCINE ADULT IM: ICD-10-PCS | Mod: S$GLB,,, | Performed by: INTERNAL MEDICINE

## 2020-05-25 PROCEDURE — 90471 IMMUNIZATION ADMIN: CPT | Mod: S$GLB,,, | Performed by: INTERNAL MEDICINE

## 2020-05-25 PROCEDURE — 90632 HEPA VACCINE ADULT IM: CPT | Mod: S$GLB,,, | Performed by: INTERNAL MEDICINE

## 2020-05-30 ENCOUNTER — PATIENT MESSAGE (OUTPATIENT)
Dept: RHEUMATOLOGY | Facility: CLINIC | Age: 60
End: 2020-05-30

## 2020-06-02 ENCOUNTER — OFFICE VISIT (OUTPATIENT)
Dept: URGENT CARE | Facility: CLINIC | Age: 60
End: 2020-06-02
Payer: COMMERCIAL

## 2020-06-02 VITALS
HEIGHT: 68 IN | WEIGHT: 204 LBS | SYSTOLIC BLOOD PRESSURE: 121 MMHG | HEART RATE: 96 BPM | BODY MASS INDEX: 30.92 KG/M2 | DIASTOLIC BLOOD PRESSURE: 81 MMHG | TEMPERATURE: 98 F | OXYGEN SATURATION: 98 %

## 2020-06-02 DIAGNOSIS — M54.2 NECK PAIN: Primary | ICD-10-CM

## 2020-06-02 PROCEDURE — 99214 OFFICE O/P EST MOD 30 MIN: CPT | Mod: S$GLB,,, | Performed by: NURSE PRACTITIONER

## 2020-06-02 PROCEDURE — 72040 XR CERVICAL SPINE 2 OR 3 VIEWS: ICD-10-PCS | Mod: FY,S$GLB,, | Performed by: RADIOLOGY

## 2020-06-02 PROCEDURE — 72040 X-RAY EXAM NECK SPINE 2-3 VW: CPT | Mod: FY,S$GLB,, | Performed by: RADIOLOGY

## 2020-06-02 PROCEDURE — 99214 PR OFFICE/OUTPT VISIT, EST, LEVL IV, 30-39 MIN: ICD-10-PCS | Mod: S$GLB,,, | Performed by: NURSE PRACTITIONER

## 2020-06-02 NOTE — PROGRESS NOTES
Subjective:       Patient ID: Kendra Griffin is a 59 y.o. female.    Chief Complaint: Shoulder Pain and Neck Pain    Patient states she fell backwards pushing a courtney.    Shoulder Pain    The pain is present in the right shoulder and left shoulder. This is a new problem. The current episode started today. There has been no history of extremity trauma. The problem occurs constantly. The problem has been gradually worsening. The pain is at a severity of 6/10. The pain is moderate. Associated symptoms include stiffness. Pertinent negatives include no fever, headaches, inability to bear weight, itching, joint locking, joint swelling, limited range of motion, numbness, tingling or visual symptoms. The symptoms are aggravated by activity. She has tried nothing for the symptoms. The treatment provided no relief. Family history includes arthritis. There is no history of diabetes or migraines.   Neck Pain    This is a new problem. The current episode started today. The problem occurs constantly. The problem has been gradually worsening. The pain is associated with a fall. The pain is present in the left side. The pain is at a severity of 6/10. The pain is moderate. The symptoms are aggravated by twisting. The pain is same all the time. Stiffness is present all day. Pertinent negatives include no chest pain, fever, headaches, leg pain, numbness, pain with swallowing, paresis, photophobia, syncope, tingling, trouble swallowing, visual change, weakness or weight loss. She has tried nothing for the symptoms. The treatment provided no relief.       Constitution: Negative for chills, fatigue and fever.   HENT: Negative for congestion, sore throat and trouble swallowing.    Neck: Positive for neck pain. Negative for painful lymph nodes.   Cardiovascular: Negative for chest pain, leg swelling and passing out.   Eyes: Negative for photophobia, double vision and blurred vision.   Respiratory: Negative for cough and shortness of  breath.    Gastrointestinal: Negative for nausea, vomiting and diarrhea.   Genitourinary: Negative for dysuria, frequency, urgency and history of kidney stones.   Musculoskeletal: Positive for pain and trauma. Negative for joint pain, joint swelling, abnormal ROM of joint, muscle cramps and muscle ache.   Skin: Negative for color change, pale, rash and bruising.   Allergic/Immunologic: Negative for seasonal allergies.   Neurological: Negative for dizziness, history of vertigo, light-headedness, passing out, headaches and numbness.   Hematologic/Lymphatic: Negative for swollen lymph nodes.   Psychiatric/Behavioral: Negative for nervous/anxious, sleep disturbance and depression. The patient is not nervous/anxious.         Objective:      Physical Exam    Assessment:       No diagnosis found.    Plan:                   No follow-ups on file.

## 2020-06-02 NOTE — LETTER
Work Status Summary - Page 1 of 2  ______________________________________________________________________    Date :  June 2, 2020 [place] :    To :  Fax # :    ______________________________________________________________________    Patient Name: Kendra Griffin   YOB: 1960   Employer:    Occupation:    Date of Injury:    Diagnosis:    ______________________________________________________________________     [   ] ABLE to work (pre-injury work level / full duty)    [X ] NOT ABLE to work at present  Estimated release to return to work: 6/4/20     [   ] ABLE to work --- transitional duty (as follows):   [   ] Sedentary Work: Lifting 10 lbs. maximum and occasionally lifting and/or  carrying articles such as dockets, ledgers and small tools. Although a sedentary  job is defined as one which involves sitting, a certain amount of walking and  standing is often necessary in carrying out job duties. Jobs are sedentary if  walking and standing are required only occasionally and other sedentary criteria  are met.      [   ] Light Work: Lifting 20 lbs. maximum with frequent lifting and/or carrying of  objects weighing up to 10 lbs. Even though the weight lifted may be only a  negligible amount, a job is in this category when it requires walking or standing  to a significant degree, or when it involves sitting most of the time with a degree  of pushing and pulling of arm and/or leg controls.      [   ] Medium Work: Lifting 50 lbs. maximum with frequent lifting and/or carrying  of objects weighing up to 25 lbs.      [   ] Heavy Work: Lifting 100 lbs. maximum with frequent lifting and/or carrying  of objects weighing up to 50 lbs.      [   ] Very Heavy Work:  Lifting objects in excess of 100 lbs. with frequent lifting  and/or carrying of objects weighing 50 lbs or more.                   THERAPY RECOMMENDATIONS:   [   ] Physical Therapy     Visits per week:   Duration (in weeks):        [   ] Occupational  Therapy  Visits per week:   Duration (in weeks):        Recommended Follow Up:    Primary Care Physician in:   days General Surgeon in:   days   Orthopedist in:   days Ophthalmologist in:   days     Other:  (list other follow up recommendation here)   days     Prescribed Medications:  None     Comments:           ______________________________________________________________________  Provider Signature / Print Name / Date / Time       Work Status Summary - Page 2 of 2    Form No. 3291   (Rev 6/21/16)   [employer] [place]

## 2020-06-02 NOTE — PROGRESS NOTES
Subjective:       Patient ID: Kendra Griffin is a 59 y.o. female.    Chief Complaint: Shoulder Pain and Neck Pain    HPI  ROS     Objective:      Physical Exam   Constitutional: She is oriented to person, place, and time. She appears well-developed and well-nourished. She is active and cooperative.  Non-toxic appearance. She does not appear ill. No distress.   HENT:   Head: Normocephalic and atraumatic.   Right Ear: Hearing, external ear and ear canal normal.   Left Ear: Hearing, external ear and ear canal normal.   Nose: Nose normal. No nasal deformity. No epistaxis. Right sinus exhibits no maxillary sinus tenderness and no frontal sinus tenderness. Left sinus exhibits no maxillary sinus tenderness and no frontal sinus tenderness.   Mouth/Throat: Uvula is midline, oropharynx is clear and moist and mucous membranes are normal. No trismus in the jaw. Normal dentition. No uvula swelling. No posterior oropharyngeal erythema.   Eyes: Conjunctivae and lids are normal. No scleral icterus.   Neck: Trachea normal, normal range of motion, full passive range of motion without pain and phonation normal. Neck supple.   Cardiovascular: Normal rate, regular rhythm, normal heart sounds, intact distal pulses and normal pulses.   Pulmonary/Chest: Effort normal. No respiratory distress.   Abdominal: Soft. Normal appearance. Bowel sounds are increased.   Musculoskeletal: She exhibits no edema or deformity.        Cervical back: She exhibits decreased range of motion, tenderness, bony tenderness, pain and spasm.   Lymphadenopathy:     She has no cervical adenopathy.   Neurological: She is alert and oriented to person, place, and time. She exhibits normal muscle tone. Coordination normal.   Skin: Skin is warm, dry and intact. She is not diaphoretic. No pallor.   Psychiatric: She has a normal mood and affect. Her speech is normal and behavior is normal. Judgment and thought content normal. Cognition and memory are normal.   Nursing note  and vitals reviewed.      Assessment:       1. Neck pain        Plan:       Patient Instructions     Please follow up with orthopedics as instructed. If you have been given a referral, [facility] will call you to select an appointment date, time, location, and health care provider. If [facility] does not call you, please call [phone] to set up your appointment. If you have taken x-rays today and would like a CD of your images, one can be provided for you.    Please return here or go to the Emergency Department for any concerns or worsening of condition.  If you were given a splint wear it at all times unless otherwise instructed.     If you were given crutches use them as we instructed. Do not rest your armpits on the foam pad or you risk compressing the nerves and the vessels there.    If you have been given a referral to orthopedics, I will NOT release you from restrictions for work or school duties. Orthopedics must clear you for full use if you have been referred-this is to protect and preserve your full use/function of that extremity/joint in the future.    If you lose control of your bowel and/or bladder, please go to the nearest Emergency Department immediately.  If you lose sensation in between your legs by your genitalia and/or rectum, please go to the nearest Emergency Department immediately.  If you lose control or sensation of any extremity, please go to the nearest Emergency Department immediately.    R.I.C.E.T. to the affected joint or limb as needed:    REST  Rest- the injured or sore extremity, this includes the use of an immobilization device you may have been given in clinic.   ICE Ice- for the next 24-48 hours, alternating 20 minutes on and 20 minutes off as needed up to 3 times a day. Don't use ice packs on the left shoulder if you have a heart condition, and don't use ice packs around the front or side of the neck. Heat treatments should be used for chronic/older conditions to help relax and  loosen tissues and to stimulate blood flow to the area. Use heat treatments for conditions such as overuse injuries before participating in activities.  When using heat treatments, be very careful to use a moderate heat for a limited time to avoid burns. Never leave heating pads or towels on for extended periods of time or while sleeping.   COMPRESSION Compression-Use an ACE wrap to provide compression to the injured extremity. Copper-infused compression garments are available over-the-counter at [employer], and other drug stores and may provide just the right amount of compression and reduce the likeliness of having to frequently adjust a bandage for comfort. Check circulation to make sure your bandage or compression device is not too tight.     ELEVATION Elevate-when possible to reduce swelling.     TOPICALS Topical relief: Tiger Balm may be used as directed on the label. Wash your hands before and after applying this medicine (do not get this medication in your eyes). For your first use, apply only to a small skin area to test how your skin reacts to the medicine. Tiger Balm contains camphor and menthol and this can cause a burning or cold sensation, which is usually mild and should lessen over time with continued use. If this sensation causes significant discomfort, wash the skin with soap and water.  Epsom salt: In water, Epsom salt breaks down into magnesium and sulfate. The theory is that when you soak in an Epsom salt bath, these get into your body through your skin. That hasn't been proven, but just soaking in warm water can help relax muscles and loosen stiff joints.       Why didn't I get a steroid shot or a medrol dose pack?  The benefits are small and, unlike topical glucocorticoids, systemic glucocorticoids possess a significant side effect profile. Major side effects include:     Skin consequences: Skin thinning and ecchymoses, Cushingoid appearance (rounded face), acne, weight gain, mild  hirsutism, facial erythema, and striae.   Eye consequences: Cataracts, increased intraocular pressure, exophthalmos.    Cardiovascular consequences: Fluid retention, premature atherosclerotic disease, and arrhythmias.    GI consequences: Increased risk for adverse gastrointestinal effects, such as gastritis, ulcer formation, and gastrointestinal bleeding   Bone and muscle consequences: Osteoporosis, osteonecrosis, and myopathy.   Neuropsychiatric effects: Mood disorders, psychosis, memory impairment, and    Metabolic and Endocrine consequences: Suppress the hypothalamic-pituitary-adrenal (HPA) axis and increase blood sugar.   Effects immunity predisposing you to getting a more severe infection and increases your white blood cell count.   Young children -- Growth impairment        Rotate Tylenol and Motrin for pain and inflammation.     Follow-up with Occupational Health.

## 2020-06-02 NOTE — PATIENT INSTRUCTIONS
Please follow up with orthopedics as instructed. If you have been given a referral, "[facility] will call you to select an appointment date, time, location, and health care provider. If "[facility] does not call you, please call [phone] to set up your appointment. If you have taken x-rays today and would like a CD of your images, one can be provided for you.    Please return here or go to the Emergency Department for any concerns or worsening of condition.  If you were given a splint wear it at all times unless otherwise instructed.     If you were given crutches use them as we instructed. Do not rest your armpits on the foam pad or you risk compressing the nerves and the vessels there.    If you have been given a referral to orthopedics, I will NOT release you from restrictions for work or school duties. Orthopedics must clear you for full use if you have been referred-this is to protect and preserve your full use/function of that extremity/joint in the future.    If you lose control of your bowel and/or bladder, please go to the nearest Emergency Department immediately.  If you lose sensation in between your legs by your genitalia and/or rectum, please go to the nearest Emergency Department immediately.  If you lose control or sensation of any extremity, please go to the nearest Emergency Department immediately.    R.I.C.E.T. to the affected joint or limb as needed:    REST  Rest- the injured or sore extremity, this includes the use of an immobilization device you may have been given in clinic.   ICE Ice- for the next 24-48 hours, alternating 20 minutes on and 20 minutes off as needed up to 3 times a day. Don't use ice packs on the left shoulder if you have a heart condition, and don't use ice packs around the front or side of the neck. Heat treatments should be used for chronic/older conditions to help relax and loosen tissues and to stimulate blood flow to the area. Use heat treatments for conditions such as  overuse injuries before participating in activities.  When using heat treatments, be very careful to use a moderate heat for a limited time to avoid burns. Never leave heating pads or towels on for extended periods of time or while sleeping.   COMPRESSION Compression-Use an ACE wrap to provide compression to the injured extremity. Copper-infused compression garments are available over-the-counter at [employer], and other drug stores and may provide just the right amount of compression and reduce the likeliness of having to frequently adjust a bandage for comfort. Check circulation to make sure your bandage or compression device is not too tight.     ELEVATION Elevate-when possible to reduce swelling.     TOPICALS Topical relief: Tiger Balm may be used as directed on the label. Wash your hands before and after applying this medicine (do not get this medication in your eyes). For your first use, apply only to a small skin area to test how your skin reacts to the medicine. Tiger Balm contains camphor and menthol and this can cause a burning or cold sensation, which is usually mild and should lessen over time with continued use. If this sensation causes significant discomfort, wash the skin with soap and water.  Epsom salt: In water, Epsom salt breaks down into magnesium and sulfate. The theory is that when you soak in an Epsom salt bath, these get into your body through your skin. That hasn't been proven, but just soaking in warm water can help relax muscles and loosen stiff joints.       Why didn't I get a steroid shot or a medrol dose pack?  The benefits are small and, unlike topical glucocorticoids, systemic glucocorticoids possess a significant side effect profile. Major side effects include:     Skin consequences: Skin thinning and ecchymoses, Cushingoid appearance (rounded face), acne, weight gain, mild hirsutism, facial erythema, and striae.   Eye consequences: Cataracts, increased intraocular  pressure, exophthalmos.    Cardiovascular consequences: Fluid retention, premature atherosclerotic disease, and arrhythmias.    GI consequences: Increased risk for adverse gastrointestinal effects, such as gastritis, ulcer formation, and gastrointestinal bleeding   Bone and muscle consequences: Osteoporosis, osteonecrosis, and myopathy.   Neuropsychiatric effects: Mood disorders, psychosis, memory impairment, and    Metabolic and Endocrine consequences: Suppress the hypothalamic-pituitary-adrenal (HPA) axis and increase blood sugar.   Effects immunity predisposing you to getting a more severe infection and increases your white blood cell count.   Young children -- Growth impairment        Rotate Tylenol and Motrin for pain and inflammation.

## 2020-06-03 ENCOUNTER — PATIENT MESSAGE (OUTPATIENT)
Dept: RHEUMATOLOGY | Facility: CLINIC | Age: 60
End: 2020-06-03

## 2020-06-03 ENCOUNTER — OFFICE VISIT (OUTPATIENT)
Dept: URGENT CARE | Facility: CLINIC | Age: 60
End: 2020-06-03
Payer: COMMERCIAL

## 2020-06-03 VITALS
TEMPERATURE: 98 F | OXYGEN SATURATION: 97 % | HEART RATE: 92 BPM | BODY MASS INDEX: 30.92 KG/M2 | HEIGHT: 68 IN | SYSTOLIC BLOOD PRESSURE: 148 MMHG | WEIGHT: 204 LBS | DIASTOLIC BLOOD PRESSURE: 94 MMHG
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
## 2020-06-03 DIAGNOSIS — M05.79 RHEUMATOID ARTHRITIS INVOLVING MULTIPLE SITES WITH POSITIVE RHEUMATOID FACTOR: Primary | ICD-10-CM

## 2020-06-03 DIAGNOSIS — S16.1XXA CERVICAL STRAIN, ACUTE, INITIAL ENCOUNTER: Primary | ICD-10-CM

## 2020-06-03 DIAGNOSIS — Z79.631 METHOTREXATE, LONG TERM, CURRENT USE: ICD-10-CM

## 2020-06-03 PROCEDURE — 96372 THER/PROPH/DIAG INJ SC/IM: CPT | Mod: S$GLB,,, | Performed by: PHYSICIAN ASSISTANT

## 2020-06-03 PROCEDURE — 99203 OFFICE O/P NEW LOW 30 MIN: CPT | Mod: 25,S$GLB,, | Performed by: PHYSICIAN ASSISTANT

## 2020-06-03 PROCEDURE — 99203 PR OFFICE/OUTPT VISIT, NEW, LEVL III, 30-44 MIN: ICD-10-PCS | Mod: 25,S$GLB,, | Performed by: PHYSICIAN ASSISTANT

## 2020-06-03 PROCEDURE — 96372 PR INJECTION,THERAP/PROPH/DIAG2ST, IM OR SUBCUT: ICD-10-PCS | Mod: S$GLB,,, | Performed by: PHYSICIAN ASSISTANT

## 2020-06-03 RX ORDER — IBUPROFEN 600 MG/1
600 TABLET ORAL 3 TIMES DAILY
Qty: 30 TABLET | Refills: 0 | Status: SHIPPED | OUTPATIENT
Start: 2020-06-04 | End: 2020-06-23 | Stop reason: HOSPADM

## 2020-06-03 RX ORDER — KETOROLAC TROMETHAMINE 30 MG/ML
60 INJECTION, SOLUTION INTRAMUSCULAR; INTRAVENOUS
Status: COMPLETED | OUTPATIENT
Start: 2020-06-03 | End: 2020-06-03

## 2020-06-03 RX ORDER — TIZANIDINE 4 MG/1
4 TABLET ORAL 2 TIMES DAILY PRN
Qty: 20 TABLET | Refills: 0 | Status: SHIPPED | OUTPATIENT
Start: 2020-06-03 | End: 2020-08-20 | Stop reason: SDUPTHER

## 2020-06-03 RX ADMIN — KETOROLAC TROMETHAMINE 60 MG: 30 INJECTION, SOLUTION INTRAMUSCULAR; INTRAVENOUS at 10:06

## 2020-06-03 NOTE — PATIENT INSTRUCTIONS
Understanding Cervical Strain    There are 7 bones (vertebrae) in the neck that are part of the spine. These are called the cervical spine. Cervical strain is a medical term for neck pain. The neck has several layers of muscles. These are connected with tendons to the cervical spine and other bones. Neck pain is often the result of injury to these muscles and tendons.  Causes of cervical strain  Different types of stress on the neck can damage muscles and tendons (soft tissues) and cause cervical strain. Cervical tissues can be damaged by:  · The neck being forced past its normal range of motion, such as in a car accident or sports injury  · Constant, low-level stress, such as from poor posture or a poorly set-up workspace  Symptoms of cervical strain  These may include:  · Neck pain or stiffness  · Pain in the shoulders or upper back  · Muscle spasms  · Headache, often starting at the base of the neck  · Irritability, difficulty concentrating, or sleeplessness  Treatment for cervical strain  This problem often gets better on its own. Treatments aim to reduce pain and inflammation and increase the range of motion of the neck. Possible treatments include:  · Over-the-counter or prescription pain medicine. These help relieve pain and inflammation.  · Stretching exercises to decrease neck stiffness.  · Massage to decrease neck stiffness.  · Cold or heat pack. These help reduce pain and swelling.  Call 911  Call emergency services right away if you have any of these:  · Face drooping or numbness  · Numbness or weakness, especially in the arms or on one side  · Slurred speech or difficulty speaking  · Blurred vision   When to call your healthcare provider  Call your healthcare provider right away if you have any of these:  · Fever of 100.4°F (38°C) or higher, or as directed  · Pain or stiffness that gets worse  · Symptoms that dont get better, or get worse  · Numbness, tingling, weakness or shooting pains into the  arms or legs  · New symptoms  Date Last Reviewed: 3/10/2016  © 8115-0828 [employer]. [address] [place] [address], [place] [address]. All rights reserved. This information is not intended as a substitute for professional medical care. Always follow your healthcare professional's instructions.

## 2020-06-03 NOTE — LETTER
[facility]  [address]  [place] [address]  Phone: [phone]  Fax: [phone]  [facility]: [id number]    Pt Name: Kendra Griffin  Injury Date: 06/02/2020   Employee ID: [id number] Date of Treatment: 06/03/2020   Company: [email or web address]      Appointment Time: 09:45 AM Arrived: 9:55 a.m.   Provider: Dennis Aguilar PA-C Time Out: 11:03 p.m.     Office Treatment:   EXAM  Toradol Injection  Home Today, Regular Duty    1. Cervical strain, acute, initial encounter    2. Work related injury      Medications Ordered This Encounter   Medications    ibuprofen (ADVIL,MOTRIN) 600 MG tablet    ketorolac injection 60 mg    tiZANidine (ZANAFLEX) 4 MG tablet           Restrictions: Home today, Regular Duty     Return Appointment: 6/10/2020 at 10:00 a.m.  [place]

## 2020-06-05 ENCOUNTER — TELEPHONE (OUTPATIENT)
Dept: URGENT CARE | Facility: CLINIC | Age: 60
End: 2020-06-05

## 2020-06-10 ENCOUNTER — OFFICE VISIT (OUTPATIENT)
Dept: URGENT CARE | Facility: CLINIC | Age: 60
End: 2020-06-10
Payer: COMMERCIAL

## 2020-06-10 DIAGNOSIS — S16.1XXA CERVICAL STRAIN, ACUTE, INITIAL ENCOUNTER: Primary | ICD-10-CM
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
## 2020-06-10 PROCEDURE — 99214 OFFICE O/P EST MOD 30 MIN: CPT | Mod: S$GLB,,, | Performed by: PHYSICIAN ASSISTANT

## 2020-06-10 PROCEDURE — 99214 PR OFFICE/OUTPT VISIT, EST, LEVL IV, 30-39 MIN: ICD-10-PCS | Mod: S$GLB,,, | Performed by: PHYSICIAN ASSISTANT

## 2020-06-10 NOTE — PROGRESS NOTES
Subjective:       Patient ID: Kendra Griffin is a 59 y.o. female.    Chief Complaint: Neck Pain    Patient is coming in for a recheck for a neck injury DOI 6/2/20. She is currently taking Ibuprofen and Zanaflex.  She gets some relief but the Zanaflex makes her sleepy. Pain today is 4/10. Feeling better than last time but has some tightness when she raises her arms over her head. More on the left side.She is currently working full duty with no problems. She is using heat with some relief. -MR    Neck Pain    The current episode started 1 to 4 weeks ago. The problem has been gradually improving. The pain is associated with a fall. The pain is present in the left side. The symptoms are aggravated by twisting and bending. The pain is same all the time. Stiffness is present all day. Associated symptoms include headaches. Pertinent negatives include no chest pain, fever, numbness or tingling. She has tried heat, muscle relaxants and NSAIDs for the symptoms. The treatment provided mild relief.       Constitution: Negative for chills and fever.   HENT: Negative for facial trauma.    Neck: Positive for neck pain and neck stiffness.   Cardiovascular: Negative for chest pain.   Eyes: Negative for eye trauma and eye pain.   Respiratory: Negative for shortness of breath.    Gastrointestinal: Negative for abdominal trauma, abdominal pain, nausea, vomiting and bowel incontinence.   Musculoskeletal: Positive for pain. Negative for joint pain and back pain.   Skin: Negative for color change, wound, abrasion and laceration.   Neurological: Positive for headaches. Negative for numbness and tingling.   Psychiatric/Behavioral: Negative for nervous/anxious. The patient is not nervous/anxious.         Objective:      Physical Exam   Constitutional: She appears well-developed and well-nourished. She is active. No distress.   HENT:   Head: Normocephalic and atraumatic. Head is without raccoon's eyes and without Brown's sign.   Right Ear:  Hearing and external ear normal.   Left Ear: Hearing and external ear normal.   Nose: Nose normal. No nasal deformity. No epistaxis.   Mouth/Throat: Oropharynx is clear and moist.   Eyes: Conjunctivae and lids are normal. No scleral icterus.   Neck: Trachea normal. Muscular tenderness present. No spinous process tenderness present. Decreased range of motion present.       Cardiovascular: Intact distal pulses and normal pulses.   Pulses:       Radial pulses are 2+ on the right side, and 2+ on the left side.   Pulmonary/Chest: Effort normal. No stridor. No respiratory distress.   Musculoskeletal:        Cervical back: She exhibits decreased range of motion and tenderness. She exhibits no deformity.   Neurological: She is alert. She has normal strength. No sensory deficit. GCS eye subscore is 4. GCS verbal subscore is 5. GCS motor subscore is 6.   Skin: Skin is warm, dry and intact. She is not diaphoretic.   Psychiatric: She has a normal mood and affect. Her speech is normal and behavior is normal. She is attentive.   Nursing note and vitals reviewed.      Assessment:       1. Cervical strain, acute, initial encounter    2. Work related injury        Plan:       Patient is improving with no radicular signs or symptoms.  Believe it is okay to discharge with current plan.  Patient instructed to return if pain persists more than a few weeks or sooner if gets worse.     Patient Instructions: Daily home exercises/warm soaks(Continue ibuprofen 600 mg 3 times daily as needed for pain.)   Restrictions: Regular Duty, Discharged from Occupational Health  Follow up if symptoms worsen or fail to improve.

## 2020-06-10 NOTE — LETTER
[facility]  [address] [place] [address]  Phone: [phone]  Fax: [phone]  [facility]: [id number]    Pt Name: Kendra Griffin  Injury Date: 07/31/2019   Employee ID: [id number] Date of Treatment: 06/10/2020   Company: [employer]      Appointment Time: 09:45 AM Arrived: 9:53 a.m.   Provider: Dennis Aguilar PA-C Time Out: 12:00 p.m.     Office Treatment:   EXAM  Regular Duty, Discharged from Occupational Health    1. Cervical strain, acute, initial encounter    2. Work related injury          Patient Instructions: Daily home exercises/warm soaks(Continue ibuprofen 600 mg 3 times daily as needed for pain.)    Restrictions: Regular Duty, Discharged from Occupational Health     Return Appointment: Discharged from Occupational Health  [place]

## 2020-06-23 ENCOUNTER — OFFICE VISIT (OUTPATIENT)
Dept: URGENT CARE | Facility: CLINIC | Age: 60
End: 2020-06-23
Payer: COMMERCIAL

## 2020-06-23 ENCOUNTER — TELEPHONE (OUTPATIENT)
Dept: RHEUMATOLOGY | Facility: CLINIC | Age: 60
End: 2020-06-23

## 2020-06-23 VITALS
DIASTOLIC BLOOD PRESSURE: 87 MMHG | OXYGEN SATURATION: 98 % | SYSTOLIC BLOOD PRESSURE: 146 MMHG | HEART RATE: 87 BPM | WEIGHT: 186 LBS | HEIGHT: 67 IN | BODY MASS INDEX: 29.19 KG/M2 | TEMPERATURE: 98 F | RESPIRATION RATE: 16 BRPM

## 2020-06-23 DIAGNOSIS — M79.642 LEFT HAND PAIN: ICD-10-CM

## 2020-06-23 DIAGNOSIS — R03.0 ELEVATED BLOOD PRESSURE READING: ICD-10-CM

## 2020-06-23 DIAGNOSIS — M25.532 LEFT WRIST PAIN: Primary | ICD-10-CM

## 2020-06-23 PROCEDURE — 99214 OFFICE O/P EST MOD 30 MIN: CPT | Mod: S$GLB,,, | Performed by: NURSE PRACTITIONER

## 2020-06-23 PROCEDURE — 99214 PR OFFICE/OUTPT VISIT, EST, LEVL IV, 30-39 MIN: ICD-10-PCS | Mod: S$GLB,,, | Performed by: NURSE PRACTITIONER

## 2020-06-23 RX ORDER — NAPROXEN 500 MG/1
500 TABLET ORAL 2 TIMES DAILY WITH MEALS
Qty: 10 TABLET | Refills: 0 | Status: SHIPPED | OUTPATIENT
Start: 2020-06-23 | End: 2020-06-28

## 2020-06-23 RX ORDER — NAPROXEN 500 MG/1
500 TABLET ORAL 2 TIMES DAILY WITH MEALS
Qty: 10 TABLET | Refills: 0 | Status: SHIPPED | OUTPATIENT
Start: 2020-06-23 | End: 2020-06-23 | Stop reason: SDUPTHER

## 2020-06-23 NOTE — TELEPHONE ENCOUNTER
----- Message from Diana Walker sent at 6/23/2020  9:43 AM CDT -----  Regarding: Urgent Care Referral  Good Morning,    Urgent Care called to see if this patient can be seen sooner than 7/7 for her Rheumatology appointment. I told them I would send a message to see and if we have something they will call patient.    Please let me know if there is anything you need help with.    Thank you,  Diana

## 2020-06-23 NOTE — PATIENT INSTRUCTIONS
General Referral to [facility]   You were referred to [facility] for Follow-up Care.   Please call [phone] to schedule your appointment.    Please go to the Emergency Department for any concerns or worsening of condition.  Please follow up with your primary care doctor or specialist in the next 48-72hrs as needed.    If you  smoke, please stop smoking.                                Follow up Discharge Instructions    If your condition worsens or fails to improve we recommend that you receive another evaluation at the ER immediately or contact your PCP to discuss your concerns or return here. You must understand that you've received an urgent care treatment only and that you may be released before all your medical problems are known or treated. You the patient will arrange for [facility] care as instructed.   If you were prescribed a narcotic or muscle relaxant do not drive or operate heavy machinery while taking these medications   Tylenol or ibuprofen can also be used as directed for pain unless you have an allergy to them or medical condition such as stomach ulcers, kidney or liver disease or blood thinners etc for which you should not be taking these type of medications.   If you were given a prescription NSAID here do not also take any over the counter NSAID like ibuprofen, aleve, advil, motrin etc   RICE which means rest, ice compression and elevation are helpful.

## 2020-06-23 NOTE — PROGRESS NOTES
"Subjective:       Patient ID: Kendra Griffin is a 59 y.o. female.    Vitals:  height is 5' 7" (1.702 m) and weight is 84.4 kg (186 lb). Her tympanic temperature is 98.4 °F (36.9 °C). Her blood pressure is 146/87 (abnormal) and her pulse is 87. Her respiration is 16 and oxygen saturation is 98%.     Chief Complaint: Wrist Pain (Left wrist - Hx of arthritis)    Wrist Pain   The pain is present in the left wrist. This is a recurrent problem. Episode onset: 3 days. There has been no history of extremity trauma. The problem occurs constantly. The problem has been gradually worsening. The quality of the pain is described as aching. The pain is at a severity of 6/10. The pain is moderate. Pertinent negatives include no fever. The symptoms are aggravated by activity. Treatments tried: ibuprofen 800, ice, heat, elevation. The treatment provided no relief.       Constitution: Negative for chills, fatigue and fever.   HENT: Negative for congestion and sore throat.    Neck: Negative for painful lymph nodes.   Cardiovascular: Negative for chest pain and leg swelling.   Eyes: Negative for double vision and blurred vision.   Respiratory: Negative for cough and shortness of breath.    Gastrointestinal: Negative for nausea, vomiting and diarrhea.   Genitourinary: Negative for dysuria, frequency, urgency and history of kidney stones.   Musculoskeletal: Positive for joint pain, joint swelling and arthritis. Negative for muscle cramps and muscle ache.   Skin: Negative for color change, pale, rash and bruising.   Allergic/Immunologic: Negative for seasonal allergies.   Neurological: Negative for dizziness, history of vertigo, light-headedness, passing out and headaches.   Hematologic/Lymphatic: Negative for swollen lymph nodes.   Psychiatric/Behavioral: Negative for nervous/anxious, sleep disturbance and depression. The patient is not nervous/anxious.        Objective:      Physical Exam   Constitutional: She is oriented to person, " place, and time.   HENT:   Head: Normocephalic and atraumatic.   Eyes: Pupils are equal, round, and reactive to light. Conjunctivae are normal.   Neck: Normal range of motion. Neck supple.   Cardiovascular: Normal rate, regular rhythm, normal heart sounds and normal pulses.   Pulmonary/Chest: Effort normal and breath sounds normal.   Abdominal: Normal appearance.   Musculoskeletal:      Left wrist: She exhibits decreased range of motion, tenderness and swelling. She exhibits no bony tenderness, no effusion, no crepitus, no deformity and no laceration.        Arms:       Left hand: She exhibits decreased range of motion, tenderness and swelling. She exhibits no bony tenderness, normal two-point discrimination, normal capillary refill, no deformity and no laceration. Normal sensation noted. Decreased strength noted. She exhibits wrist extension trouble. She exhibits no finger abduction and no thumb/finger opposition.   Neurological: She is alert and oriented to person, place, and time.   Skin: Skin is warm and dry.   Nursing note and vitals reviewed.              Assessment:       1. Left wrist pain    2. Left hand pain    3. Elevated blood pressure reading        Plan:       Patient reports she does not take the Methotrexate because she does not feel it helps.  Advised patient that she should follow up with her rheumatologist immediately regarding her medication and to address this current situation.  Patient also advised that I reached out to the scheduling department who sent a message to her rheumatologist to schedule a sooner appointment.  Reviewed plan of care with patient who verbalized understanding and agrees with plan of care.    Left wrist pain  -     Ambulatory referral/consult to Rheumatology  -     Discontinue: naproxen (NAPROSYN) 500 MG tablet; Take 1 tablet (500 mg total) by mouth 2 (two) times daily with meals. for 5 days  Dispense: 10 tablet; Refill: 0  -     naproxen (NAPROSYN) 500 MG tablet; Take 1  tablet (500 mg total) by mouth 2 (two) times daily with meals. for 5 days  Dispense: 10 tablet; Refill: 0  -     WRIST BRACE FOR HOME USE    Left hand pain  -     Discontinue: naproxen (NAPROSYN) 500 MG tablet; Take 1 tablet (500 mg total) by mouth 2 (two) times daily with meals. for 5 days  Dispense: 10 tablet; Refill: 0  -     naproxen (NAPROSYN) 500 MG tablet; Take 1 tablet (500 mg total) by mouth 2 (two) times daily with meals. for 5 days  Dispense: 10 tablet; Refill: 0  -     WRIST BRACE FOR HOME USE    Elevated blood pressure reading  -     Ambulatory referral/consult to Internal Medicine      Patient Instructions     General Referral to [facility]   You were referred to [facility] for Follow-up Care.   Please call [phone] to schedule your appointment.    Please go to the Emergency Department for any concerns or worsening of condition.  Please follow up with your primary care doctor or specialist in the next 48-72hrs as needed.    If you  smoke, please stop smoking.                                Follow up Discharge Instructions    If your condition worsens or fails to improve we recommend that you receive another evaluation at the ER immediately or contact your PCP to discuss your concerns or return here. You must understand that you've received an urgent care treatment only and that you may be released before all your medical problems are known or treated. You the patient will arrange for follw care as instructed.   If you were prescribed a narcotic or muscle relaxant do not drive or operate heavy machinery while taking these medications   Tylenol or ibuprofen can also be used as directed for pain unless you have an allergy to them or medical condition such as stomach ulcers, kidney or liver disease or blood thinners etc for which you should not be taking these type of medications.   If you were given a prescription NSAID here do not also take any over the counter NSAID like  ibuprofen, aleve, advil, motrin etc   RICE which means rest, ice compression and elevation are helpful.

## 2020-06-26 ENCOUNTER — TELEPHONE (OUTPATIENT)
Dept: URGENT CARE | Facility: CLINIC | Age: 60
End: 2020-06-26

## 2020-06-26 NOTE — TELEPHONE ENCOUNTER
Call Back DOS  06/23/2020 I attempted to call and check on the patient but no answer and no voicemail box set up to leave a message.

## 2020-07-02 ENCOUNTER — PATIENT MESSAGE (OUTPATIENT)
Dept: RHEUMATOLOGY | Facility: CLINIC | Age: 60
End: 2020-07-02

## 2020-07-06 ENCOUNTER — PATIENT OUTREACH (OUTPATIENT)
Dept: ADMINISTRATIVE | Facility: OTHER | Age: 60
End: 2020-07-06

## 2020-07-06 DIAGNOSIS — Z12.31 ENCOUNTER FOR SCREENING MAMMOGRAM FOR MALIGNANT NEOPLASM OF BREAST: Primary | ICD-10-CM

## 2020-07-06 NOTE — PROGRESS NOTES
Requested updates within Care Everywhere.  Patient's chart was reviewed for overdue ALEX topics.  Immunizations reconciled.    Orders placed: mammo

## 2020-07-07 ENCOUNTER — OFFICE VISIT (OUTPATIENT)
Dept: RHEUMATOLOGY | Facility: CLINIC | Age: 60
End: 2020-07-07
Payer: COMMERCIAL

## 2020-07-07 VITALS
WEIGHT: 209.88 LBS | DIASTOLIC BLOOD PRESSURE: 92 MMHG | SYSTOLIC BLOOD PRESSURE: 151 MMHG | HEART RATE: 76 BPM | TEMPERATURE: 98 F | BODY MASS INDEX: 32.87 KG/M2

## 2020-07-07 DIAGNOSIS — M05.79 RHEUMATOID ARTHRITIS INVOLVING MULTIPLE SITES WITH POSITIVE RHEUMATOID FACTOR: Primary | ICD-10-CM

## 2020-07-07 DIAGNOSIS — R53.83 FATIGUE, UNSPECIFIED TYPE: ICD-10-CM

## 2020-07-07 DIAGNOSIS — D84.9 IMMUNOSUPPRESSION: ICD-10-CM

## 2020-07-07 PROCEDURE — 99999 PR PBB SHADOW E&M-EST. PATIENT-LVL III: CPT | Mod: PBBFAC,,, | Performed by: INTERNAL MEDICINE

## 2020-07-07 PROCEDURE — 3008F BODY MASS INDEX DOCD: CPT | Mod: CPTII,S$GLB,, | Performed by: INTERNAL MEDICINE

## 2020-07-07 PROCEDURE — 96372 THER/PROPH/DIAG INJ SC/IM: CPT | Mod: S$GLB,,, | Performed by: INTERNAL MEDICINE

## 2020-07-07 PROCEDURE — 96372 PR INJECTION,THERAP/PROPH/DIAG2ST, IM OR SUBCUT: ICD-10-PCS | Mod: S$GLB,,, | Performed by: INTERNAL MEDICINE

## 2020-07-07 PROCEDURE — 99999 PR PBB SHADOW E&M-EST. PATIENT-LVL III: ICD-10-PCS | Mod: PBBFAC,,, | Performed by: INTERNAL MEDICINE

## 2020-07-07 PROCEDURE — 3008F PR BODY MASS INDEX (BMI) DOCUMENTED: ICD-10-PCS | Mod: CPTII,S$GLB,, | Performed by: INTERNAL MEDICINE

## 2020-07-07 PROCEDURE — 3080F DIAST BP >= 90 MM HG: CPT | Mod: CPTII,S$GLB,, | Performed by: INTERNAL MEDICINE

## 2020-07-07 PROCEDURE — 3080F PR MOST RECENT DIASTOLIC BLOOD PRESSURE >= 90 MM HG: ICD-10-PCS | Mod: CPTII,S$GLB,, | Performed by: INTERNAL MEDICINE

## 2020-07-07 PROCEDURE — 99214 OFFICE O/P EST MOD 30 MIN: CPT | Mod: 25,S$GLB,, | Performed by: INTERNAL MEDICINE

## 2020-07-07 PROCEDURE — 3077F PR MOST RECENT SYSTOLIC BLOOD PRESSURE >= 140 MM HG: ICD-10-PCS | Mod: CPTII,S$GLB,, | Performed by: INTERNAL MEDICINE

## 2020-07-07 PROCEDURE — 3077F SYST BP >= 140 MM HG: CPT | Mod: CPTII,S$GLB,, | Performed by: INTERNAL MEDICINE

## 2020-07-07 PROCEDURE — 99214 PR OFFICE/OUTPT VISIT, EST, LEVL IV, 30-39 MIN: ICD-10-PCS | Mod: 25,S$GLB,, | Performed by: INTERNAL MEDICINE

## 2020-07-07 RX ORDER — LEFLUNOMIDE 20 MG/1
20 TABLET ORAL DAILY
Qty: 30 TABLET | Refills: 2 | Status: SHIPPED | OUTPATIENT
Start: 2020-07-07 | End: 2020-09-28

## 2020-07-07 RX ORDER — TRIAMCINOLONE ACETONIDE 40 MG/ML
80 INJECTION, SUSPENSION INTRA-ARTICULAR; INTRAMUSCULAR
Status: COMPLETED | OUTPATIENT
Start: 2020-07-07 | End: 2020-07-07

## 2020-07-07 RX ADMIN — TRIAMCINOLONE ACETONIDE 80 MG: 40 INJECTION, SUSPENSION INTRA-ARTICULAR; INTRAMUSCULAR at 10:07

## 2020-07-07 NOTE — PROGRESS NOTES
Rapid3 Question Responses and Scores 7/7/2020   MDHAQ Score 1.8   Psychologic Score 6.6   Pain Score 6   When you awakened in the morning OVER THE LAST WEEK, did you feel stiff? Yes   If Yes, please indicate the number of hours until you are as limber as you will be for the day 4   Fatigue Score 6   Global Health Score 6   RAPID3 Score 6         Answers for HPI/ROS submitted by the patient on 7/7/2020   fever: No  eye redness: No  headaches: No  shortness of breath: No  chest pain: No  trouble swallowing: No  diarrhea: No  constipation: No  unexpected weight change: No  genital sore: No  dysuria: No  During the last 3 days, have you had a skin rash?: No  Bruises or bleeds easily: No  cough: No

## 2020-07-07 NOTE — LETTER
July 10, 2020      Raghu Soto, NP  [address] [facility] [address]           [facility]  [address]  [place] [address]  Phone: [phone]  Fax: [phone]          Patient: Kendra Griffin   MR Number: [id number]   YOB: 1960   Date of Visit: 7/7/2020       Dear Raghu Soto:    Thank you for referring Kendra Griffin to me for evaluation. Attached you will find relevant portions of my assessment and plan of care.    If you have questions, please do not hesitate to call me. I look forward to following Kendra Griffin along with you.    Sincerely,    Donna Valdez MD    Enclosure  CC:  No Recipients    If you would like to receive this communication electronically, please contact [email or web address] or [phone] to request more information on [employer] access.    For providers and/or their staff who would like to refer a patient to [facility], please contact us through our one-stop-shop provider referral line, [facility], at [phone].    If you feel you have received this communication in error or would no longer like to receive these types of communications, please e-mail [email or web address]

## 2020-07-07 NOTE — PATIENT INSTRUCTIONS
Leflunomide tablets  What is this medicine?  LEFLUNOMIDE (le FLOO na mide) is for rheumatoid arthritis.  How should I use this medicine?  Take this medicine by mouth with a full glass of water. Follow the directions on the prescription label. Take your medicine at regular intervals. Do not take your medicine more often than directed. Do not stop taking except on your doctor's advice.  Talk to your pediatrician regarding the use of this medicine in children. Special care may be needed.  What side effects may I notice from receiving this medicine?  Side effects that you should report to your doctor or health care professional as soon as possible:  · allergic reactions like skin rash, itching or hives, swelling of the face, lips, or tongue  · cough  · difficulty breathing or shortness of breath  · fever, chills or any other sign of infection  · redness, blistering, peeling or loosening of the skin, including inside the mouth  · unusual bleeding or bruising  · unusually weak or tired  · vomiting  · yellowing of eyes or skin  Side effects that usually do not require medical attention (report to your doctor or health care professional if they continue or are bothersome):  · diarrhea  · hair loss  · headache  · nausea  What may interact with this medicine?  Do not take this medicine with any of the following medications:  · teriflunomide  This medicine may also interact with the following medications:  · charcoal  · cholestyramine  · methotrexate  · NSAIDs, medicines for pain and inflammation, like ibuprofen or naproxen  · phenytoin  · rifampin  · tolbutamide  · vaccines  · warfarin  What if I miss a dose?  If you miss a dose, take it as soon as you can. If it is almost time for your next dose, take only that dose. Do not take double or extra doses.  Where should I keep my medicine?  Keep out of the reach of children.  Store at room temperature between 15 and 30 degrees C (59 and 86 degrees F). Protect from moisture and  light. Throw away any unused medicine after the expiration date.  What should I tell my health care provider before I take this medicine?  They need to know if you have any of these conditions:  · alcoholism  · bone marrow problems  · fever or infection  · immune system problems  · kidney disease  · liver disease  · an unusual or allergic reaction to leflunomide, teriflunomide, other medicines, lactose, foods, dyes, or preservatives  · pregnant or trying to get pregnant  · breast-feeding  What should I watch for while using this medicine?  Visit your doctor or health care professional for regular checks on your progress. You will need frequent blood checks while you are receiving the medicine.  If you get a cold or other infection while receiving this medicine, call your doctor or health care professional. Do not treat yourself. The medicine may increase your risk of getting an infection.  If you are a woman who has the potential to become pregnant, discuss birth control options with your doctor or health care professional. You must not be pregnant, and you must be using a reliable form of birth control. The medicine may harm an unborn baby. Immediately call your doctor if you think you might be pregnant.  Alcoholic drinks may increase possible damage to your liver. Do not drink alcohol while taking this medicine.  NOTE:This sheet is a summary. It may not cover all possible information. If you have questions about this medicine, talk to your doctor, pharmacist, or health care provider. Copyright© 2017 [employer]        Leflunomide tablets  What is this medicine?  LEFLUNOMIDE (le FLOO na mide) is for rheumatoid arthritis.  How should I use this medicine?  Take this medicine by mouth with a full glass of water. Follow the directions on the prescription label. Take your medicine at regular intervals. Do not take your medicine more often than directed. Do not stop taking except on your doctor's advice.  Talk to your  pediatrician regarding the use of this medicine in children. Special care may be needed.  What side effects may I notice from receiving this medicine?  Side effects that you should report to your doctor or health care professional as soon as possible:  · allergic reactions like skin rash, itching or hives, swelling of the face, lips, or tongue  · cough  · difficulty breathing or shortness of breath  · fever, chills or any other sign of infection  · redness, blistering, peeling or loosening of the skin, including inside the mouth  · unusual bleeding or bruising  · unusually weak or tired  · vomiting  · yellowing of eyes or skin  Side effects that usually do not require medical attention (report to your doctor or health care professional if they continue or are bothersome):  · diarrhea  · hair loss  · headache  · nausea  What may interact with this medicine?  Do not take this medicine with any of the following medications:  · teriflunomide  This medicine may also interact with the following medications:  · charcoal  · cholestyramine  · methotrexate  · NSAIDs, medicines for pain and inflammation, like ibuprofen or naproxen  · phenytoin  · rifampin  · tolbutamide  · vaccines  · warfarin  What if I miss a dose?  If you miss a dose, take it as soon as you can. If it is almost time for your next dose, take only that dose. Do not take double or extra doses.  Where should I keep my medicine?  Keep out of the reach of children.  Store at room temperature between 15 and 30 degrees C (59 and 86 degrees F). Protect from moisture and light. Throw away any unused medicine after the expiration date.  What should I tell my health care provider before I take this medicine?  They need to know if you have any of these conditions:  · alcoholism  · bone marrow problems  · fever or infection  · immune system problems  · kidney disease  · liver disease  · an unusual or allergic reaction to leflunomide, teriflunomide, other medicines,  lactose, foods, dyes, or preservatives  · pregnant or trying to get pregnant  · breast-feeding  What should I watch for while using this medicine?  Visit your doctor or health care professional for regular checks on your progress. You will need frequent blood checks while you are receiving the medicine.  If you get a cold or other infection while receiving this medicine, call your doctor or health care professional. Do not treat yourself. The medicine may increase your risk of getting an infection.  If you are a woman who has the potential to become pregnant, discuss birth control options with your doctor or health care professional. You must not be pregnant, and you must be using a reliable form of birth control. The medicine may harm an unborn baby. Immediately call your doctor if you think you might be pregnant.  Alcoholic drinks may increase possible damage to your liver. Do not drink alcohol while taking this medicine.  NOTE:This sheet is a summary. It may not cover all possible information. If you have questions about this medicine, talk to your doctor, pharmacist, or health care provider. Copyright© 2017 [employer]

## 2020-07-07 NOTE — PROGRESS NOTES
Chief Complaint: Joint pain    HPI:  Kendra Griffin is a 59 y.o. female with HTN, elevated cholesterol, anemia, and GERD sent in consultation by Dr. Armando Knox for RA.  Since July 2019 had multiple joint pain and decrease mobility.  RF and CCP were found to be positive.    She has seen ID and completed vaccinations except for one more vaccination due April 2020.  She is not currently on prednisone since it made it difficult for her to sleep.   She does not like the side effects     Stopped MTX 2 months ago due to stomach upset.  Patient was taking 6 tabs MTX once a week on Friday.    Hands, shoulders, feet and left knee are painful.   One hour morning stiffness.        6/10 ache in hands and knees pain.    One hour morning stiffness.  Worse with activity.   Nothing improves pain.  Has not taken prednisone.                Review of Systems   Constitutional: Positive for unexpected weight change (gaining weight). Negative for fever.   HENT: Negative for trouble swallowing.         Dry mouth   Eyes: Negative for redness.        Dry eye   Respiratory: Negative for cough and shortness of breath.    Cardiovascular: Negative for chest pain.   Gastrointestinal: Negative for constipation and diarrhea.   Genitourinary: Negative for dysuria and genital sores.   Musculoskeletal: Positive for arthralgias.   Skin: Negative for rash.   Neurological: Negative for headaches.   Hematological: Does not bruise/bleed easily.         Objective:   /92   HR=76  Bc=548 lbs     Physical Exam   Constitutional: She is oriented to person, place, and time and well-developed, well-nourished, and in no distress.   HENT:   Head: Normocephalic and atraumatic.   Eyes: Conjunctivae and EOM are normal.   Neurological: She is alert and oriented to person, place, and time.   Psychiatric: Mood and affect normal.   CV: RRR no murmur  Pulm: CTA b/l  GI: Soft nontender +BS  Musculoskeletal:   28 joint count: 6 tender (wrists, 2nd and 3rd MCP  bilaterally) and swollen (same).             LABS    Component      Latest Ref Rng & Units 7/1/2020 5/18/2020   WBC      3.90 - 12.70 K/uL 5.41 6.66   RBC      4.00 - 5.40 M/uL 4.76 4.85   Hemoglobin      12.0 - 16.0 g/dL 10.8 (L) 10.9 (L)   Hematocrit      37.0 - 48.5 % 37.5 37.6   MCV      82 - 98 fL 79 (L) 78 (L)   MCH      27.0 - 31.0 pg 22.7 (L) 22.5 (L)   MCHC      32.0 - 36.0 g/dL 28.8 (L) 29.0 (L)   RDW      11.5 - 14.5 % 16.5 (H) 18.3 (H)   Platelets      150 - 350 K/uL 344 312   MPV      9.2 - 12.9 fL 9.0 (L) 9.4   Immature Granulocytes      0.0 - 0.5 % 0.2 0.3   Gran # (ANC)      1.8 - 7.7 K/uL 3.1 4.9   Immature Grans (Abs)      0.00 - 0.04 K/uL 0.01 0.02   Lymph #      1.0 - 4.8 K/uL 1.6 1.2   Mono #      0.3 - 1.0 K/uL 0.4 0.4   Eos #      0.0 - 0.5 K/uL 0.3 0.1   Baso #      0.00 - 0.20 K/uL 0.02 0.03   nRBC      0 /100 WBC 0 0   Gran%      38.0 - 73.0 % 57.5 73.5 (H)   Lymph%      18.0 - 48.0 % 29.4 18.0   Mono%      4.0 - 15.0 % 7.9 6.2   Eosinophil%      0.0 - 8.0 % 4.6 1.5   Basophil%      0.0 - 1.9 % 0.4 0.5   Differential Method       Automated Automated   Sodium      136 - 145 mmol/L 146 (H) 140   Potassium      3.5 - 5.1 mmol/L 4.6 3.7   Chloride      95 - 110 mmol/L 108 105   CO2      23 - 29 mmol/L 30 (H) 27   Glucose      70 - 110 mg/dL 101 89   BUN, Bld      7 - 17 mg/dL 11 16   Creatinine      0.50 - 1.40 mg/dL 0.69 0.90   Calcium      8.7 - 10.5 mg/dL 10.0 9.9   PROTEIN TOTAL      6.0 - 8.4 g/dL 7.6 7.9   Albumin      3.5 - 5.2 g/dL 4.4 4.6   BILIRUBIN TOTAL      0.1 - 1.0 mg/dL 0.5 0.6   Alkaline Phosphatase      38 - 126 U/L 116 102   AST      15 - 46 U/L 52 (H) 28   ALT      10 - 44 U/L 47 (H) 23   Anion Gap      8 - 16 mmol/L 8 8   eGFR if African American      >60 mL/min/1.73 m:2 >60.0 >60.0   eGFR if non African American      >60 mL/min/1.73 m:2 >60.0 >60.0   Sed Rate      0 - 20 mm/Hr 14 26 (H)   CRP      0.0 - 8.2 mg/L 8.8 (H) 0.42           Assessment:       1.  Rheumatoid  arthritis.  Manifested by joint pain, positive RF/CCP/SKYLA, elevated CRP.  Active now off DMARD  2.  Hypertension.  Took BP med today  3.  Hypercholesterolemia  4.  Anemia  5.  GERD with esophagitis  6.  Chronic back pain   7.  History of murmur.  Evaluated by cardiology and no significant issue found.  8.  Abnormal stress echo. Follow with primary.       Plan:       1.  Add leflunomide 20 mg daily.  Repeat labs in 1 month.  2.  Consider biologic.    3.  Completed ID for vaccination  4.  Patient declined oral steroid.  Will give Kenalog 80 mg IM.  Continue Omeprazole daily  5.  Calcium 1,000 mg daily and vitamin D 1,000 units daily  6.  RTO in 3 months

## 2020-07-09 ENCOUNTER — OFFICE VISIT (OUTPATIENT)
Dept: URGENT CARE | Facility: CLINIC | Age: 60
End: 2020-07-09
Payer: COMMERCIAL

## 2020-07-09 VITALS
HEART RATE: 72 BPM | BODY MASS INDEX: 32.8 KG/M2 | HEIGHT: 67 IN | WEIGHT: 209 LBS | RESPIRATION RATE: 18 BRPM | OXYGEN SATURATION: 96 % | DIASTOLIC BLOOD PRESSURE: 84 MMHG | TEMPERATURE: 98 F | SYSTOLIC BLOOD PRESSURE: 130 MMHG

## 2020-07-09 DIAGNOSIS — M25.411 SWELLING OF JOINT OF RIGHT SHOULDER: ICD-10-CM

## 2020-07-09 DIAGNOSIS — S43.401A SPRAIN OF RIGHT SHOULDER, UNSPECIFIED SHOULDER SPRAIN TYPE, INITIAL ENCOUNTER: Primary | ICD-10-CM

## 2020-07-09 PROCEDURE — 99214 OFFICE O/P EST MOD 30 MIN: CPT | Mod: S$GLB,,, | Performed by: PREVENTIVE MEDICINE

## 2020-07-09 PROCEDURE — 99214 PR OFFICE/OUTPT VISIT, EST, LEVL IV, 30-39 MIN: ICD-10-PCS | Mod: S$GLB,,, | Performed by: PREVENTIVE MEDICINE

## 2020-07-09 PROCEDURE — 73030 XR SHOULDER TRAUMA 3 VIEW RIGHT: ICD-10-PCS | Mod: FY,RT,S$GLB, | Performed by: RADIOLOGY

## 2020-07-09 PROCEDURE — 73030 X-RAY EXAM OF SHOULDER: CPT | Mod: FY,RT,S$GLB, | Performed by: RADIOLOGY

## 2020-07-09 RX ORDER — NAPROXEN 500 MG/1
500 TABLET ORAL 2 TIMES DAILY WITH MEALS
Qty: 60 TABLET | Refills: 1 | Status: SHIPPED | OUTPATIENT
Start: 2020-07-09 | End: 2020-08-20 | Stop reason: ALTCHOICE

## 2020-07-09 RX ORDER — METHOCARBAMOL 500 MG/1
500 TABLET, FILM COATED ORAL NIGHTLY
Qty: 30 TABLET | Refills: 1 | Status: SHIPPED | OUTPATIENT
Start: 2020-07-09 | End: 2020-11-02

## 2020-07-09 NOTE — PROGRESS NOTES
Subjective:       Patient ID: Kendra Griffin is a 59 y.o. female.    Chief Complaint: Shoulder Injury (Right)    New injury - Right shoulder (DOI 7/7/2020) [facility] [employer]- Patient states she reaching to get a desk off a shelf, she states she felt a pull in her right shoulder when bringing the desk down. She complains of pain and unable to lift her right arm. Pain level 6/10 on today. Patient states she has a prior injury that just happened in June 2020 that she has been seeing Manuelito Aguilar for.  [place]    Shoulder Injury   The incident occurred at work. The right shoulder is affected. The incident occurred 2 days ago. The injury mechanism was overhead work. The quality of the pain is described as aching. The pain does not radiate. The pain is at a severity of 6/10. The pain is moderate. Associated symptoms include muscle weakness. Pertinent negatives include no chest pain, numbness or tingling. The symptoms are aggravated by overhead lifting and movement. She has tried nothing for the symptoms.       Constitution: Negative for fatigue.   HENT: Negative for facial swelling and facial trauma.    Neck: Negative for neck stiffness.   Cardiovascular: Negative for chest trauma and chest pain.   Eyes: Negative for eye trauma, double vision and blurred vision.   Respiratory: Negative.    Gastrointestinal: Negative for abdominal trauma, abdominal pain and rectal bleeding.   Endocrine: negative.   Genitourinary: Negative for hematuria, missed menses, genital trauma and pelvic pain.   Musculoskeletal: Positive for pain, trauma, joint swelling, abnormal ROM of joint and muscle ache.   Skin: Negative for color change, wound, abrasion, laceration and bruising.   Allergic/Immunologic: Negative.    Neurological: Negative for dizziness, history of vertigo, light-headedness, coordination disturbances, altered mental status, loss of consciousness and numbness.   Hematologic/Lymphatic: Negative for history of bleeding disorder.    Psychiatric/Behavioral: Negative for altered mental status.        Objective:      Physical Exam  Vitals signs and nursing note reviewed.   Constitutional:       Appearance: She is well-developed.   HENT:      Head: Normocephalic.   Eyes:      Pupils: Pupils are equal, round, and reactive to light.   Neck:      Musculoskeletal: Normal range of motion.   Cardiovascular:      Rate and Rhythm: Normal rate.   Pulmonary:      Effort: Pulmonary effort is normal.   Musculoskeletal:      Right shoulder: She exhibits decreased range of motion, tenderness and pain. She exhibits no bony tenderness, no swelling, no effusion, no crepitus, no deformity, no laceration, no spasm, normal pulse and normal strength.      Lumbar back: She exhibits no bony tenderness, no swelling, no edema, no deformity, no laceration, no spasm and normal pulse.        Arms:       Comments: Patient has complaints of pain with palpation about the superior anterior aspects of her right shoulder.  She has pain with range of motion testing with complaints of pain on flexion to 90°, extension to 25°, and abduction to 90°.  She has complaints of pain with internal external rotation.  She has no signs of obvious impingement with Leblanc and Owsley's test both negative.  Distal pulses are equal intact.   Skin:     General: Skin is warm.   Neurological:      Mental Status: She is alert.      Comments: No focal neurologic deficits       X-ray Shoulder Trauma 3 View Right    Result Date: 7/9/2020  EXAMINATION: XR SHOULDER TRAUMA 3 VIEW RIGHT CLINICAL HISTORY: Unspecified sprain of right shoulder joint, initial encounter TECHNIQUE: Three or four views of the right shoulder were performed. COMPARISON: None FINDINGS: Three views right shoulder. The right humeral head maintains appropriate relationship with the glenoid.  The acromioclavicular joint is intact.  No acute displaced right rib fracture.  The right lung zones are clear.     1. No acute displaced fracture  or dislocation of the shoulder. Electronically signed by: Papa Hammer MD Date:    07/09/2020 Time:    14:27  Assessment:       1. Sprain of right shoulder, unspecified shoulder sprain type, initial encounter    2. Swelling of joint of right shoulder        Plan:         Medications Ordered This Encounter   Medications    methocarbamoL (ROBAXIN) 500 MG Tab     Sig: Take 1 tablet (500 mg total) by mouth nightly.     Dispense:  30 tablet     Refill:  1    naproxen (NAPROSYN) 500 MG tablet     Sig: Take 1 tablet (500 mg total) by mouth 2 (two) times daily with meals.     Dispense:  60 tablet     Refill:  1     Patient Instructions: Daily home exercises/warm soaks(Shoulder exercises demonstrated in the office)   Restrictions: Disabled until next office visit  Follow up in about 5 days (around 7/14/2020).

## 2020-07-09 NOTE — LETTER
[facility]  [address] [place] [address]  Phone: [phone]  Fax: [phone]  [facility]: [id number]    Pt Name: Kendra Griffin  Injury Date: 07/07/2020   Employee ID: [id number] Date of First Treatment: 07/09/2020   Company: [employer]      Appointment Time: 12:50 PM Arrived: 12:15 p.m.   Provider: Shadi Puri MD Time Out: 2:50 p.m.     Office Treatment:   EXAM  X-Ray  Disabled until next office visit    1. Sprain of right shoulder, unspecified shoulder sprain type, initial encounter    2. Swelling of joint of right shoulder      Medications Ordered This Encounter   Medications    methocarbamoL (ROBAXIN) 500 MG Tab    naproxen (NAPROSYN) 500 MG tablet      Patient Instructions: Daily home exercises/warm soaks(Shoulder exercises demonstrated in the office)    Restrictions: Disabled until next office visit     Return Appointment: 7/14/2020 at 12:00 p.m.  [place]

## 2020-07-11 DIAGNOSIS — I10 ESSENTIAL (PRIMARY) HYPERTENSION: ICD-10-CM

## 2020-07-12 RX ORDER — LOSARTAN POTASSIUM 50 MG/1
TABLET ORAL
Qty: 90 TABLET | Refills: 0 | Status: SHIPPED | OUTPATIENT
Start: 2020-07-12 | End: 2020-10-06 | Stop reason: SDUPTHER

## 2020-07-12 RX ORDER — AMLODIPINE BESYLATE 10 MG/1
TABLET ORAL
Qty: 90 TABLET | Refills: 0 | Status: SHIPPED | OUTPATIENT
Start: 2020-07-12 | End: 2020-09-02

## 2020-07-12 NOTE — TELEPHONE ENCOUNTER
Refill approved.  Due for routine physical with lipid panel only.  Please sched.    Thank you,  Arik

## 2020-07-14 ENCOUNTER — OFFICE VISIT (OUTPATIENT)
Dept: URGENT CARE | Facility: CLINIC | Age: 60
End: 2020-07-14
Payer: COMMERCIAL

## 2020-07-14 DIAGNOSIS — M25.411 SWELLING OF JOINT OF RIGHT SHOULDER: ICD-10-CM

## 2020-07-14 DIAGNOSIS — S43.401D SPRAIN OF RIGHT SHOULDER, UNSPECIFIED SHOULDER SPRAIN TYPE, SUBSEQUENT ENCOUNTER: Primary | ICD-10-CM

## 2020-07-14 PROCEDURE — 99214 OFFICE O/P EST MOD 30 MIN: CPT | Mod: S$GLB,,, | Performed by: PREVENTIVE MEDICINE

## 2020-07-14 PROCEDURE — 99214 PR OFFICE/OUTPT VISIT, EST, LEVL IV, 30-39 MIN: ICD-10-PCS | Mod: S$GLB,,, | Performed by: PREVENTIVE MEDICINE

## 2020-07-14 NOTE — PROGRESS NOTES
Subjective:       Patient ID: Kendra Griffin is a 59 y.o. female.    Chief Complaint: Shoulder Injury (Right)    RV, Right shoulder (DOI 7/7/2020) [facility] [employer]- Patient states her shoulder feels better but still has minor pain and discomfort and does not have complete range of motion. Pain level 2/10 on today. Taking Naproxen and Tizanidine. [place]    Shoulder Injury   The incident occurred at work. The right shoulder is affected. The incident occurred more than 1 week ago. The injury mechanism was overhead work. The pain radiates to the right arm. The pain is at a severity of 2/10. The pain is mild. Associated symptoms include muscle weakness. Pertinent negatives include no chest pain, numbness or tingling. The symptoms are aggravated by overhead lifting. She has tried NSAIDs (muscle relaxer) for the symptoms. The treatment provided moderate relief.       Constitution: Negative for fatigue.   HENT: Negative for facial swelling and facial trauma.    Neck: Negative for neck stiffness.   Cardiovascular: Negative for chest trauma and chest pain.   Eyes: Negative for eye trauma, double vision and blurred vision.   Respiratory: Negative.    Gastrointestinal: Negative for abdominal trauma, abdominal pain and rectal bleeding.   Endocrine: negative.   Genitourinary: Negative for hematuria, missed menses, genital trauma and pelvic pain.   Musculoskeletal: Positive for pain, trauma, abnormal ROM of joint and muscle ache. Negative for joint swelling.   Skin: Negative for color change, wound, abrasion, laceration and bruising.   Allergic/Immunologic: Negative.    Neurological: Negative for dizziness, history of vertigo, light-headedness, coordination disturbances, altered mental status, loss of consciousness and numbness.   Hematologic/Lymphatic: Negative for history of bleeding disorder.   Psychiatric/Behavioral: Negative for altered mental status.        Objective:      Physical Exam  Vitals signs and nursing  note reviewed.   Constitutional:       Appearance: She is well-developed.   HENT:      Head: Normocephalic.   Eyes:      Pupils: Pupils are equal, round, and reactive to light.   Neck:      Musculoskeletal: Normal range of motion.   Cardiovascular:      Rate and Rhythm: Normal rate.   Pulmonary:      Effort: Pulmonary effort is normal.   Musculoskeletal:      Right shoulder: She exhibits decreased range of motion, tenderness and pain. She exhibits no bony tenderness, no swelling, no effusion, no crepitus, no deformity, no laceration, no spasm, normal pulse and normal strength.      Lumbar back: She exhibits no bony tenderness, no swelling, no edema, no deformity, no laceration, no spasm and normal pulse.        Arms:       Comments: Patient has less pain with palpation range of motion testing of her right shoulder.  She has mild pain with palpation along the superior and anterior aspects of her right shoulder.  She has pain with range of motion testing with complaints of pain on flexion to 90°, extension to 25°, and abduction to 90°.  She has complaints of pain with internal external rotation.  She has no signs of obvious impingement with Leblanc and Wedron's test both negative.  Distal pulses are equal intact.   Skin:     General: Skin is warm.   Neurological:      Mental Status: She is alert.      Comments: No focal neurologic deficits         Assessment:       1. Sprain of right shoulder, unspecified shoulder sprain type, subsequent encounter    2. Swelling of joint of right shoulder        Plan:       Patient has improved her range of motion with less pain about her right shoulder and therefore will be allowed to return to work next week.  She will continue taking Naprosyn 500 mg twice a day with food and Robaxin 500 mg primarily at night.  She will also continue her exercises demonstrated in the office for her right shoulder with warm soaks daily.     Patient Instructions: Daily home exercises/warm soaks    Restrictions: Regular Duty(May resume regular work on July 20, 2020)  Follow up in about 2 weeks (around 7/28/2020).       How Severe Are Your Spot(S)?: moderate What Is The Reason For Today's Visit?: Full Body Skin Examination What Is The Reason For Today's Visit? (Being Monitored For X): the risk of recurrence of previously treated lesion(s) Additional History: Lesions above left eye and on right hand for 3 weeks. FBE.

## 2020-07-14 NOTE — LETTER
[facility]  [address]  [place] [address]  Phone: [phone]  Fax: [phone]  [facility]: [id number]    Pt Name: Kendra Griffin  Injury Date: 07/07/2020   Employee ID: [id number] Date of  Treatment: 07/14/2020   Company: [employer]      Appointment Time: 11:45 AM Arrived: 11:45 a.m.   Provider: Shadi Puri MD Time Out: 1:18 p.m.     Office Treatment:   1. Sprain of right shoulder, unspecified shoulder sprain type, subsequent encounter    2. Swelling of joint of right shoulder          Patient Instructions: Daily home exercises/warm soaks    Restrictions: Regular Duty(May resume regular work on July 20, 2020)     Return Appointment: 7/28/2020 at 12:00 noon  [place]

## 2020-07-28 ENCOUNTER — OFFICE VISIT (OUTPATIENT)
Dept: URGENT CARE | Facility: CLINIC | Age: 60
End: 2020-07-28
Payer: COMMERCIAL

## 2020-07-28 DIAGNOSIS — S43.401A SPRAIN OF RIGHT SHOULDER, UNSPECIFIED SHOULDER SPRAIN TYPE, INITIAL ENCOUNTER: ICD-10-CM

## 2020-07-28 DIAGNOSIS — M25.411 SWELLING OF JOINT OF RIGHT SHOULDER: ICD-10-CM

## 2020-07-28 DIAGNOSIS — S43.401D SPRAIN OF RIGHT SHOULDER, UNSPECIFIED SHOULDER SPRAIN TYPE, SUBSEQUENT ENCOUNTER: Primary | ICD-10-CM

## 2020-07-28 PROCEDURE — 99214 PR OFFICE/OUTPT VISIT, EST, LEVL IV, 30-39 MIN: ICD-10-PCS | Mod: S$GLB,,, | Performed by: PREVENTIVE MEDICINE

## 2020-07-28 PROCEDURE — 99214 OFFICE O/P EST MOD 30 MIN: CPT | Mod: S$GLB,,, | Performed by: PREVENTIVE MEDICINE

## 2020-07-28 NOTE — PROGRESS NOTES
Subjective:       Patient ID: Kendra Griffin is a 59 y.o. female.    Chief Complaint: Shoulder Injury (Right)    RV, Right shoulder/arm (DOI 7/7/2020) [facility]- Patient states his right shoulder/arm feels better, she still has minor pain and discomfort with movement/lifting. Pain level 3/10 on today. She is currently taking Zanaflex and Naproxen. [place]    Shoulder Injury   The incident occurred at work. The right shoulder is affected. The incident occurred more than 1 week ago. The injury mechanism was overhead work. The pain does not radiate. The pain is at a severity of 3/10. The pain is mild. Pertinent negatives include no chest pain, muscle weakness, numbness or tingling. The symptoms are aggravated by movement. She has tried NSAIDs (muscle relaxers) for the symptoms. The treatment provided moderate relief.       Constitution: Negative for fatigue.   HENT: Negative for facial swelling and facial trauma.    Neck: Negative for neck stiffness.   Cardiovascular: Negative for chest trauma and chest pain.   Eyes: Negative for eye trauma, double vision and blurred vision.   Respiratory: Negative.    Gastrointestinal: Negative for abdominal trauma, abdominal pain and rectal bleeding.   Endocrine: negative.   Genitourinary: Negative for hematuria, missed menses, genital trauma and pelvic pain.   Musculoskeletal: Positive for pain, abnormal ROM of joint and muscle ache. Negative for trauma and joint swelling.   Skin: Negative for color change, wound, abrasion, laceration and bruising.   Allergic/Immunologic: Negative.    Neurological: Negative for dizziness, history of vertigo, light-headedness, coordination disturbances, altered mental status, loss of consciousness and numbness.   Hematologic/Lymphatic: Negative for history of bleeding disorder.   Psychiatric/Behavioral: Negative for altered mental status.        Objective:      Physical Exam  Vitals signs and nursing note reviewed.   Constitutional:        Appearance: Normal appearance. She is well-developed.   HENT:      Head: Normocephalic.   Eyes:      Extraocular Movements: Extraocular movements intact.      Pupils: Pupils are equal, round, and reactive to light.   Neck:      Musculoskeletal: Normal range of motion and neck supple.   Cardiovascular:      Rate and Rhythm: Normal rate and regular rhythm.   Pulmonary:      Effort: Pulmonary effort is normal.      Breath sounds: Normal breath sounds.   Musculoskeletal:      Right shoulder: She exhibits decreased range of motion, tenderness and pain. She exhibits no bony tenderness, no swelling, no effusion, no crepitus, no deformity, no laceration, no spasm, normal pulse and normal strength.      Lumbar back: She exhibits no bony tenderness, no swelling, no edema, no deformity, no laceration, no spasm and normal pulse.        Arms:       Comments: Patient has much less pain about her right shoulder with both palpation and range of motion testing.  Residual pain she has now is primarily along the posterior aspect of her right shoulder.  She has on flexion to 90°, extension to 25°, and abduction to 90°.  She has no complaints of pain with internal external rotation.  She has no signs of obvious impingement with Leblanc and [place]'s test both negative.  Distal pulses are equal intact.   Skin:     General: Skin is warm.   Neurological:      Mental Status: She is alert.      Comments: No focal neurologic deficits         Assessment:       1. Sprain of right shoulder, unspecified shoulder sprain type, subsequent encounter    2. Swelling of joint of right shoulder    3. Sprain of right shoulder, unspecified shoulder sprain type, initial encounter        Plan:     patient's range of motion is much improved with home exercises she is doing and warm soaks daily.  She will continue taking Naprosyn 500 mg twice a day and Robaxin 500 mg primarily at night.  She has resumed her regular work and tolerating this for the most part  without significant increase in pain in her right shoulder.       Patient Instructions: Daily home exercises/warm soaks   Restrictions: Regular Duty  Follow up in about 23 days (around 8/20/2020).

## 2020-07-28 NOTE — LETTER
[facility]  [address]  [place] [address]  Phone: [phone]  Fax: [phone]  [facility]: [id number]    Pt Name: Kendra Griffin  Injury Date: 07/07/2020   Employee ID: [id number] Date of  Treatment: 07/28/2020   Company: [employer]      Appointment Time: 11:45 AM Arrived: 11:54 a.m.   Provider: Shadi Puri MD Time Out: 1:00 p.m.     Office Treatment:   1. Sprain of right shoulder, unspecified shoulder sprain type, subsequent encounter    2. Swelling of joint of right shoulder    3. Sprain of right shoulder, unspecified shoulder sprain type, initial encounter          Patient Instructions: Daily home exercises/warm soaks    Restrictions: Regular Duty     Return Appointment: 8/20/2020 at 9:30 a.m.  [place]

## 2020-07-29 ENCOUNTER — TELEPHONE (OUTPATIENT)
Dept: RHEUMATOLOGY | Facility: CLINIC | Age: 60
End: 2020-07-29

## 2020-08-11 ENCOUNTER — PATIENT MESSAGE (OUTPATIENT)
Dept: RHEUMATOLOGY | Facility: CLINIC | Age: 60
End: 2020-08-11

## 2020-08-12 ENCOUNTER — PATIENT MESSAGE (OUTPATIENT)
Dept: RHEUMATOLOGY | Facility: CLINIC | Age: 60
End: 2020-08-12

## 2020-08-13 ENCOUNTER — PATIENT MESSAGE (OUTPATIENT)
Dept: RHEUMATOLOGY | Facility: CLINIC | Age: 60
End: 2020-08-13

## 2020-08-20 ENCOUNTER — OFFICE VISIT (OUTPATIENT)
Dept: URGENT CARE | Facility: CLINIC | Age: 60
End: 2020-08-20
Payer: COMMERCIAL

## 2020-08-20 DIAGNOSIS — S43.401D SPRAIN OF RIGHT SHOULDER, UNSPECIFIED SHOULDER SPRAIN TYPE, SUBSEQUENT ENCOUNTER: Primary | ICD-10-CM

## 2020-08-20 DIAGNOSIS — M25.411 SWELLING OF JOINT OF RIGHT SHOULDER: ICD-10-CM

## 2020-08-20 DIAGNOSIS — S16.1XXA CERVICAL STRAIN, ACUTE, INITIAL ENCOUNTER: ICD-10-CM

## 2020-08-20 PROCEDURE — 99214 PR OFFICE/OUTPT VISIT, EST, LEVL IV, 30-39 MIN: ICD-10-PCS | Mod: S$GLB,,, | Performed by: PREVENTIVE MEDICINE

## 2020-08-20 PROCEDURE — 99214 OFFICE O/P EST MOD 30 MIN: CPT | Mod: S$GLB,,, | Performed by: PREVENTIVE MEDICINE

## 2020-08-20 RX ORDER — TIZANIDINE 4 MG/1
4 TABLET ORAL 2 TIMES DAILY PRN
Qty: 30 TABLET | Refills: 1 | Status: SHIPPED | OUTPATIENT
Start: 2020-08-20 | End: 2020-11-02

## 2020-08-20 RX ORDER — NAPROXEN 500 MG/1
500 TABLET ORAL 2 TIMES DAILY WITH MEALS
Qty: 60 TABLET | Refills: 1 | Status: SHIPPED | OUTPATIENT
Start: 2020-08-20 | End: 2021-08-20

## 2020-08-20 NOTE — LETTER
[facility]  [address] [place] [address]  Phone: [phone]  Fax: [phone]  [facility]: [id number]    Pt Name: Kendra Griffin  Injury Date: 07/07/2020   Employee ID:[id number] Date of Treatment: 08/20/2020   Company: [employer]      Appointment Time: 09:15 AM Arrived: 9:18 a.m.   Provider: Shadi Puri MD Time Out: 11:00 a.m.     Office Treatment:   1. Sprain of right shoulder, unspecified shoulder sprain type, subsequent encounter    2. Swelling of joint of right shoulder    3. Cervical strain, acute, initial encounter      Medications Ordered This Encounter   Medications    naproxen (NAPROSYN) 500 MG tablet    tiZANidine (ZANAFLEX) 4 MG tablet      Patient Instructions: Daily home exercises/warm soaks    Restrictions: Regular Duty(Continue exercises for right shoulder demonstrated in the office)     Return Appointment: 9/10/2020 at 9:30 a.m.  [place]

## 2020-08-20 NOTE — PROGRESS NOTES
Subjective:       Patient ID: Kendra Griffin is a 59 y.o. female.    Chief Complaint: Shoulder Pain    RV, Right shoulder/arm (DOI 7/7/2020) [facility]- Patient states her right shoulder/arm has improved since her last visit. Pain 0/10.Pain only comes when moving arm to quick or lifting   things that are to heavy.  She is currently taking Zanaflex and Naproxen and its helping. IJ    Shoulder Injury   The incident occurred at work. The right shoulder is affected. The incident occurred more than 1 week ago. The injury mechanism was overhead work. The pain does not radiate. The pain is at a severity of 0/10. The patient is experiencing no pain. Pertinent negatives include no chest pain, muscle weakness or numbness. The symptoms are aggravated by movement. She has tried NSAIDs (muscle relaxers) for the symptoms. The treatment provided moderate relief.       Constitution: Negative for fatigue.   HENT: Negative for facial swelling and facial trauma.    Neck: Negative for neck stiffness.   Cardiovascular: Negative for chest trauma and chest pain.   Eyes: Negative for eye trauma, double vision and blurred vision.   Respiratory: Negative.    Gastrointestinal: Negative for abdominal trauma, abdominal pain and rectal bleeding.   Endocrine: negative.   Genitourinary: Negative for hematuria, missed menses, genital trauma and pelvic pain.   Musculoskeletal: Positive for pain and muscle ache. Negative for trauma, joint swelling and abnormal ROM of joint.   Skin: Negative for color change, wound, abrasion, laceration and bruising.   Allergic/Immunologic: Negative.    Neurological: Negative for dizziness, history of vertigo, light-headedness, coordination disturbances, altered mental status, loss of consciousness and numbness.   Hematologic/Lymphatic: Negative for history of bleeding disorder.   Psychiatric/Behavioral: Negative for altered mental status.        Objective:      Physical Exam  Vitals signs and nursing  note reviewed.   Constitutional:       Appearance: She is well-developed.   HENT:      Head: Normocephalic.   Eyes:      Pupils: Pupils are equal, round, and reactive to light.   Neck:      Musculoskeletal: Normal range of motion.   Cardiovascular:      Rate and Rhythm: Normal rate.   Pulmonary:      Effort: Pulmonary effort is normal.   Musculoskeletal:      Right shoulder: She exhibits decreased range of motion, tenderness and pain. She exhibits no bony tenderness, no swelling, no effusion, no crepitus, no deformity, no laceration, no spasm, normal pulse and normal strength.      Lumbar back: She exhibits no bony tenderness, no swelling, no edema, no deformity, no laceration, no spasm and normal pulse.        Arms:       Comments: Patient has minimal pain about right shoulder were both palpation range of motion testing.  She has pain with range of motion testing with complaints of pain on flexion to 90°, extension to 25°, and abduction to 90°.  She has complaints of pain with internal external rotation.  She has no signs of obvious impingement with Leblanc and Hyde's test both negative.  Distal pulses are equal intact.   Skin:     General: Skin is warm.   Neurological:      Mental Status: She is alert.      Comments: No focal neurologic deficits         Assessment:       1. Sprain of right shoulder, unspecified shoulder sprain type, subsequent encounter    2. Swelling of joint of right shoulder    3. Cervical strain, acute, initial encounter        Plan:         Medications Ordered This Encounter   Medications    naproxen (NAPROSYN) 500 MG tablet     Sig: Take 1 tablet (500 mg total) by mouth 2 (two) times daily with meals.     Dispense:  60 tablet     Refill:  1    tiZANidine (ZANAFLEX) 4 MG tablet     Sig: Take 1 tablet (4 mg total) by mouth 2 (two) times daily as needed (muscle spasms). Take off duty only. May cause drowsiness.     Dispense:  30 tablet     Refill:  1     Patient Instructions: Daily home  exercises/warm soaks   Restrictions: Regular Duty(Continue exercises for right shoulder demonstrated in the office)  Follow up in about 3 weeks (around 9/10/2020).

## 2020-08-24 ENCOUNTER — TELEPHONE (OUTPATIENT)
Dept: RHEUMATOLOGY | Facility: CLINIC | Age: 60
End: 2020-08-24

## 2020-08-24 ENCOUNTER — PATIENT MESSAGE (OUTPATIENT)
Dept: RHEUMATOLOGY | Facility: CLINIC | Age: 60
End: 2020-08-24

## 2020-08-24 NOTE — TELEPHONE ENCOUNTER
Spoke with patient and her employer is asking for dates.  Last day she worked is August 17.  Will have her work on medication adjustment. To return to work 11/3/2020 or earlier if improvement with med changes.  Staff to schedule office visit.  Patient may require short term disability form completed.

## 2020-09-02 DIAGNOSIS — I10 ESSENTIAL (PRIMARY) HYPERTENSION: ICD-10-CM

## 2020-09-02 RX ORDER — AMLODIPINE BESYLATE 10 MG/1
TABLET ORAL
Qty: 90 TABLET | Refills: 0 | Status: SHIPPED | OUTPATIENT
Start: 2020-09-02 | End: 2020-10-06 | Stop reason: SDUPTHER

## 2020-09-10 ENCOUNTER — OFFICE VISIT (OUTPATIENT)
Dept: URGENT CARE | Facility: CLINIC | Age: 60
End: 2020-09-10
Payer: COMMERCIAL

## 2020-09-10 DIAGNOSIS — S10.93XD CONTUSION OF FACE, SCALP AND NECK, SUBSEQUENT ENCOUNTER: ICD-10-CM

## 2020-09-10 DIAGNOSIS — M25.411 SWELLING OF JOINT OF RIGHT SHOULDER: ICD-10-CM

## 2020-09-10 DIAGNOSIS — S00.83XD CONTUSION OF FACE, SCALP AND NECK, SUBSEQUENT ENCOUNTER: ICD-10-CM

## 2020-09-10 DIAGNOSIS — S43.401D SPRAIN OF RIGHT SHOULDER, UNSPECIFIED SHOULDER SPRAIN TYPE, SUBSEQUENT ENCOUNTER: ICD-10-CM

## 2020-09-10 DIAGNOSIS — S00.03XD CONTUSION OF FACE, SCALP AND NECK, SUBSEQUENT ENCOUNTER: ICD-10-CM

## 2020-09-10 DIAGNOSIS — S13.9XXD NECK SPRAIN, SUBSEQUENT ENCOUNTER: Primary | ICD-10-CM

## 2020-09-10 PROCEDURE — 70260 XR SKULL COMPLETE MIN 4 VIEWS: ICD-10-PCS | Mod: FY,S$GLB,, | Performed by: RADIOLOGY

## 2020-09-10 PROCEDURE — 70260 X-RAY EXAM OF SKULL: CPT | Mod: FY,S$GLB,, | Performed by: RADIOLOGY

## 2020-09-10 PROCEDURE — 99214 OFFICE O/P EST MOD 30 MIN: CPT | Mod: S$GLB,,, | Performed by: PREVENTIVE MEDICINE

## 2020-09-10 PROCEDURE — 99214 PR OFFICE/OUTPT VISIT, EST, LEVL IV, 30-39 MIN: ICD-10-PCS | Mod: S$GLB,,, | Performed by: PREVENTIVE MEDICINE

## 2020-09-10 NOTE — PROGRESS NOTES
Subjective:       Patient ID: Kendra Griffin is a 59 y.o. female.    Chief Complaint: Shoulder Pain (Right)    RV, Right shoulder (DOI 7/7/2020) [facility]- Patient states her right shoulder is better and denies any pain on today. She only complains of a pulling discomfort when she extends her arm. She takes Naproxen and Zanaflex when needed. [place]    Shoulder Pain   The pain is present in the right shoulder. This is a recurrent problem. The current episode started more than 1 month ago. There has been no history of extremity trauma. The problem occurs rarely. The problem has been rapidly improving. The pain is at a severity of 0/10. The patient is experiencing no pain. Pertinent negatives include no fever, headaches, inability to bear weight, itching, joint locking, joint swelling, limited range of motion, numbness, stiffness, tingling or visual symptoms. She has tried NSAIDS (muscle relaxers) for the symptoms. The treatment provided significant relief. Family history includes arthritis. There is no history of diabetes, Injuries to Extremity or migraines.       Constitution: Negative for fatigue and fever.   HENT: Negative for facial swelling and facial trauma.    Neck: Negative for neck stiffness.   Cardiovascular: Negative for chest trauma.   Eyes: Negative for eye trauma, double vision and blurred vision.   Respiratory: Negative.    Gastrointestinal: Negative for abdominal trauma, abdominal pain and rectal bleeding.   Endocrine: negative.   Genitourinary: Negative for hematuria, missed menses, genital trauma and pelvic pain.   Musculoskeletal: Negative for pain, trauma, joint swelling and abnormal ROM of joint.   Skin: Negative for color change, wound, abrasion, laceration and bruising.   Allergic/Immunologic: Negative.    Neurological: Negative for dizziness, history of vertigo, light-headedness, coordination disturbances, headaches, altered mental status, loss of consciousness and numbness.    Hematologic/Lymphatic: Negative for history of bleeding disorder.   Psychiatric/Behavioral: Negative for altered mental status.        Objective:      Physical Exam  Vitals signs and nursing note reviewed.   Constitutional:       Appearance: She is well-developed.   HENT:      Head: Normocephalic and atraumatic. No raccoon eyes, Brown's sign, abrasion, contusion, masses, right periorbital erythema, left periorbital erythema or laceration. Hair is normal.      Jaw: There is normal jaw occlusion.      Salivary Glands: Right salivary gland is not diffusely enlarged. Left salivary gland is not diffusely enlarged.     Eyes:      Pupils: Pupils are equal, round, and reactive to light.   Neck:      Musculoskeletal: Normal range of motion.   Cardiovascular:      Rate and Rhythm: Normal rate.   Pulmonary:      Effort: Pulmonary effort is normal.   Musculoskeletal:      Right shoulder: She exhibits decreased range of motion, tenderness and pain. She exhibits no bony tenderness, no swelling, no effusion, no crepitus, no deformity, no laceration, no spasm, normal pulse and normal strength.      Cervical back: She exhibits decreased range of motion, tenderness and pain. She exhibits no bony tenderness, no swelling, no edema, no deformity, no laceration, no spasm and normal pulse.      Lumbar back: She exhibits no bony tenderness, no swelling, no edema, no deformity, no laceration, no spasm and normal pulse.        Back:         Arms:       Comments: Patient continues to have pain right side of neck and  head radiating to the right shoulder with palpation and range of motion testing.  She has pain with palpation of the right paracervical and parietal area of the skull.  No swelling or ecchymosis is noted in this area.  She has complaints of pain with flexion of the neck to approximately 45°, extension to 10°, and lateral bending rotation to 25°.  He has no pain with abduction of the right shoulder to approximately 90°, flexion  to 90°, and extension of 45°.  She has no pain with internal external rotation of her right shoulder.   She has no signs of obvious impingement with Leblanc and Jefferson's test both negative.  Distal pulses are equal intact.   Skin:     General: Skin is warm.   Neurological:      Mental Status: She is alert.      Comments: No focal neurologic deficits         Assessment:       1. Neck sprain, subsequent encounter    2. Sprain of right shoulder, unspecified shoulder sprain type, subsequent encounter    3. Swelling of joint of right shoulder    4. Contusion of face, scalp and neck, subsequent encounter        Plan:                   Follow up if symptoms worsen or fail to improve.

## 2020-09-10 NOTE — LETTER
[facility] [place]  [address]  [place] [address]  Phone: [phone]  Fax: [phone]  [facility]: [id number]    Pt Name: Kendra Griffin  Injury Date: 07/07/2020   Employee ID: [id number] Date of Treatment: 09/10/2020   Company:[employer]      Appointment Time: 09:30 AM Arrived: 9:28am   Provider: Shadi Puri MD Time Out:11:20am     Office Treatment:     1. Neck sprain, subsequent encounter    2. Sprain of right shoulder, unspecified shoulder sprain type, subsequent encounter    3. Swelling of joint of right shoulder    4. Contusion of face, scalp and neck, subsequent encounter          Patient Instructions: Daily home exercises/warm soaks    Restrictions: Discharged from Occupational Health, Regular Duty     Return Appointment: NONE

## 2020-09-26 DIAGNOSIS — R53.83 FATIGUE, UNSPECIFIED TYPE: ICD-10-CM

## 2020-09-26 DIAGNOSIS — M05.79 RHEUMATOID ARTHRITIS INVOLVING MULTIPLE SITES WITH POSITIVE RHEUMATOID FACTOR: ICD-10-CM

## 2020-09-26 DIAGNOSIS — D84.9 IMMUNOSUPPRESSION: ICD-10-CM

## 2020-09-28 ENCOUNTER — PATIENT MESSAGE (OUTPATIENT)
Dept: RHEUMATOLOGY | Facility: CLINIC | Age: 60
End: 2020-09-28

## 2020-09-28 ENCOUNTER — PATIENT OUTREACH (OUTPATIENT)
Dept: ADMINISTRATIVE | Facility: OTHER | Age: 60
End: 2020-09-28

## 2020-09-28 RX ORDER — LEFLUNOMIDE 20 MG/1
TABLET ORAL
Qty: 30 TABLET | Refills: 0 | Status: SHIPPED | OUTPATIENT
Start: 2020-09-28 | End: 2020-10-05 | Stop reason: SDUPTHER

## 2020-09-28 NOTE — PROGRESS NOTES
Care Everywhere: updated  Immunization: updated  Health Maintenance: updated  Media Review:   Legacy Review:   Order placed:   Upcoming appts:  Mammogram scheduling ticket sent to patient's portal on 7/6/2020   Scheduling ticket to schedule annual pcp visit sent to patient's portal

## 2020-09-29 ENCOUNTER — OFFICE VISIT (OUTPATIENT)
Dept: RHEUMATOLOGY | Facility: CLINIC | Age: 60
End: 2020-09-29
Payer: COMMERCIAL

## 2020-09-29 DIAGNOSIS — D84.9 IMMUNOSUPPRESSION: ICD-10-CM

## 2020-09-29 DIAGNOSIS — R53.83 FATIGUE, UNSPECIFIED TYPE: ICD-10-CM

## 2020-09-29 DIAGNOSIS — R42 DIZZINESS: ICD-10-CM

## 2020-09-29 DIAGNOSIS — M05.79 RHEUMATOID ARTHRITIS INVOLVING MULTIPLE SITES WITH POSITIVE RHEUMATOID FACTOR: Primary | ICD-10-CM

## 2020-09-29 PROCEDURE — 99214 OFFICE O/P EST MOD 30 MIN: CPT | Mod: 95,,, | Performed by: INTERNAL MEDICINE

## 2020-09-29 PROCEDURE — 99214 PR OFFICE/OUTPT VISIT, EST, LEVL IV, 30-39 MIN: ICD-10-PCS | Mod: 95,,, | Performed by: INTERNAL MEDICINE

## 2020-09-29 ASSESSMENT — ROUTINE ASSESSMENT OF PATIENT INDEX DATA (RAPID3)
PSYCHOLOGICAL DISTRESS SCORE: 7.7
WHEN YOU AWAKENED IN THE MORNING OVER THE LAST WEEK, PLEASE INDICATE THE AMOUNT OF TIME IT TAKES UNTIL YOU ARE AS LIMBER AS YOU WILL BE FOR THE DAY: 4 HOURS
TOTAL RAPID3 SCORE: 4.17
PATIENT GLOBAL ASSESSMENT SCORE: 4.5
AM STIFFNESS SCORE: 1, YES
PAIN SCORE: 3
MDHAQ FUNCTION SCORE: 1.5
FATIGUE SCORE: 3.5

## 2020-09-29 NOTE — PROGRESS NOTES
Chief Complaint: Joint pain    HPI:  Kendra Griffin is a 59 y.o. female with HTN, elevated cholesterol, anemia, and GERD sent in consultation by Dr. Armando Knox for RA.  Since July 2019 had multiple joint pain and decrease mobility.  RF and CCP were found to be positive.    She has seen ID and completed vaccinations except for one more vaccination due April 2020.  She is not currently on prednisone since it made it difficult for her to sleep.   She does not like the side effects     Stopped MTX 2 due to stomach upset.         Hands, shoulders, feet and left knee are painful.   She is 20% better on leflunomide  Two hours morning stiffness.        4/10 ache in hands and knees pain.    Worse with activity.   Nothing improves pain.  Has not taken prednisone.                Review of Systems   Constitutional: Positive for unexpected weight change (gaining weight). Negative for fever.   HENT: Negative for trouble swallowing.     Eyes: Negative for redness.   Respiratory: Negative for cough and shortness of breath.    Cardiovascular: Negative for chest pain.   Gastrointestinal: Negative for constipation and diarrhea.   Genitourinary: Negative for dysuria and genital sores.   Musculoskeletal: Positive for arthralgias.   Skin: Negative for rash.   Neurological: Negative for headaches.  Dizziness  Hematological: Does not bruise/bleed easily.         Objective:   Virtual visit     Physical Exam   Constitutional: She is oriented to person, place, and time and well-developed, well-nourished, and in no distress.   HENT:   Head: Normocephalic and atraumatic.   Eyes: Conjunctivae and EOM are normal.   Neurological: She is alert and oriented to person, place, and time.   Psychiatric: Mood and affect normal.     Musculoskeletal:   Stiffness with opening and closing hands on camera.             LABS    Component      Latest Ref Rng & Units 8/12/2020   WBC      3.90 - 12.70 K/uL 4.79   RBC      4.00 - 5.40 M/uL 4.81   Hemoglobin       12.0 - 16.0 g/dL 11.0 (L)   Hematocrit      37.0 - 48.5 % 38.1   MCV      82 - 98 fL 79 (L)   MCH      27.0 - 31.0 pg 22.9 (L)   MCHC      32.0 - 36.0 g/dL 28.9 (L)   RDW      11.5 - 14.5 % 16.3 (H)   Platelets      150 - 350 K/uL 297   MPV      9.2 - 12.9 fL 10.3   Immature Granulocytes      0.0 - 0.5 % 0.2   Gran # (ANC)      1.8 - 7.7 K/uL 2.5   Immature Grans (Abs)      0.00 - 0.04 K/uL 0.01   Lymph #      1.0 - 4.8 K/uL 1.7   Mono #      0.3 - 1.0 K/uL 0.4   Eos #      0.0 - 0.5 K/uL 0.2   Baso #      0.00 - 0.20 K/uL 0.03   nRBC      0 /100 WBC 0   Gran%      38.0 - 73.0 % 52.2   Lymph%      18.0 - 48.0 % 34.4   Mono%      4.0 - 15.0 % 8.6   Eosinophil%      0.0 - 8.0 % 4.0   Basophil%      0.0 - 1.9 % 0.6   Differential Method       Automated   Sodium      136 - 145 mmol/L 147 (H)   Potassium      3.5 - 5.1 mmol/L 4.2   Chloride      95 - 110 mmol/L 110   CO2      23 - 29 mmol/L 30 (H)   Glucose      70 - 110 mg/dL 86   BUN, Bld      7 - 17 mg/dL 15   Creatinine      0.50 - 1.40 mg/dL 0.66   Calcium      8.7 - 10.5 mg/dL 9.4   PROTEIN TOTAL      6.0 - 8.4 g/dL 7.4   Albumin      3.5 - 5.2 g/dL 4.1   BILIRUBIN TOTAL      0.1 - 1.0 mg/dL 0.5   Alkaline Phosphatase      38 - 126 U/L 106   AST      15 - 46 U/L 29   ALT      10 - 44 U/L 19   Anion Gap      8 - 16 mmol/L 7 (L)   eGFR if African American      >60 mL/min/1.73 m:2 >60.0   eGFR if non African American      >60 mL/min/1.73 m:2 >60.0   Sed Rate      0 - 20 mm/Hr 16   CRP      0.0 - 8.2 mg/L 3.6           Assessment:       1.  Rheumatoid arthritis.  Manifested by joint pain, positive RF/CCP/SKYLA, elevated CRP.  Active on leflunomide.  2.  Hypertension.    3.  Hypercholesterolemia  4.  Anemia  5.  GERD with esophagitis  6.  Chronic back pain   7.  History of murmur.  Evaluated by cardiology and no significant issue found.  8.  Abnormal stress echo. Follow with primary.   9.  Dizziness with BP med      Plan:       1.  Continue leflunomide 20 mg daily.   Repeat labs.  2.  Consider biologic.    3.  Completed ID for vaccination  4.  Patient declined oral steroid.  Hold pantoprazole daily  5.  Calcium 1,000 mg daily and vitamin D 1,000 units daily  6.  Patient to get flu vaccination with primary and discuss dizziness that she thinks is from BP med    RTO in 3 months    The patient location is: home  The chief complaint leading to consultation is: RA    Visit type: TELE AUDIOVISUAL:88083    Face to Face time with patient: 15 min  25 minutes of total time spent on the encounter, which includes face to face time and non-face to face time preparing to see the patient (eg, review of tests), Obtaining and/or reviewing separately obtained history, Documenting clinical information in the electronic or other health record, Independently interpreting results (not separately reported) and communicating results to the patient/family/caregiver, or Care coordination (not separately reported).         Each patient to whom he or she provides medical services by telemedicine is:  (1) informed of the relationship between the physician and patient and the respective role of any other health care provider with respect to management of the patient; and (2) notified that he or she may decline to receive medical services by telemedicine and may withdraw from such care at any time.    Notes: see above

## 2020-09-29 NOTE — PROGRESS NOTES
Rapid3 Question Responses and Scores 9/28/2020   MDHAQ Score 1.5   Psychologic Score 7.7   Pain Score 3   When you awakened in the morning OVER THE LAST WEEK, did you feel stiff? Yes   If Yes, please indicate the number of hours until you are as limber as you will be for the day 4   Fatigue Score 3.5   Global Health Score 4.5   RAPID3 Score 4.16       Answers for HPI/ROS submitted by the patient on 9/28/2020   fever: No  eye redness: No  mouth sores: No  headaches: No  shortness of breath: No  chest pain: No  trouble swallowing: No  diarrhea: No  constipation: No  unexpected weight change: Yes  genital sore: No  dysuria: No  During the last 3 days, have you had a skin rash?: No  Bruises or bleeds easily: No  cough: No

## 2020-09-30 ENCOUNTER — PATIENT MESSAGE (OUTPATIENT)
Dept: RHEUMATOLOGY | Facility: CLINIC | Age: 60
End: 2020-09-30

## 2020-10-05 ENCOUNTER — PATIENT MESSAGE (OUTPATIENT)
Dept: RHEUMATOLOGY | Facility: CLINIC | Age: 60
End: 2020-10-05

## 2020-10-05 DIAGNOSIS — S16.1XXA CERVICAL STRAIN, ACUTE, INITIAL ENCOUNTER: ICD-10-CM

## 2020-10-05 DIAGNOSIS — D84.9 IMMUNOSUPPRESSION: ICD-10-CM

## 2020-10-05 DIAGNOSIS — Z79.631 METHOTREXATE, LONG TERM, CURRENT USE: ICD-10-CM

## 2020-10-05 DIAGNOSIS — R53.83 FATIGUE, UNSPECIFIED TYPE: ICD-10-CM

## 2020-10-05 DIAGNOSIS — M05.79 RHEUMATOID ARTHRITIS INVOLVING MULTIPLE SITES WITH POSITIVE RHEUMATOID FACTOR: ICD-10-CM

## 2020-10-06 ENCOUNTER — PATIENT MESSAGE (OUTPATIENT)
Dept: RHEUMATOLOGY | Facility: CLINIC | Age: 60
End: 2020-10-06

## 2020-10-06 ENCOUNTER — OFFICE VISIT (OUTPATIENT)
Dept: FAMILY MEDICINE | Facility: CLINIC | Age: 60
End: 2020-10-06
Payer: COMMERCIAL

## 2020-10-06 ENCOUNTER — HOSPITAL ENCOUNTER (OUTPATIENT)
Dept: RADIOLOGY | Facility: HOSPITAL | Age: 60
Discharge: HOME | End: 2020-10-06
Attending: INTERNAL MEDICINE
Payer: COMMERCIAL

## 2020-10-06 VITALS
OXYGEN SATURATION: 99 % | HEIGHT: 67 IN | WEIGHT: 198.88 LBS | SYSTOLIC BLOOD PRESSURE: 138 MMHG | BODY MASS INDEX: 31.21 KG/M2 | DIASTOLIC BLOOD PRESSURE: 82 MMHG | HEART RATE: 82 BPM | TEMPERATURE: 99 F

## 2020-10-06 DIAGNOSIS — K21.9 GERD WITHOUT ESOPHAGITIS: Chronic | ICD-10-CM

## 2020-10-06 DIAGNOSIS — Z00.00 ROUTINE MEDICAL EXAM: Primary | ICD-10-CM

## 2020-10-06 DIAGNOSIS — Z23 NEEDS FLU SHOT: ICD-10-CM

## 2020-10-06 DIAGNOSIS — D50.9 MICROCYTIC ANEMIA: Chronic | ICD-10-CM

## 2020-10-06 DIAGNOSIS — E78.00 PURE HYPERCHOLESTEROLEMIA: Chronic | ICD-10-CM

## 2020-10-06 DIAGNOSIS — I10 ESSENTIAL (PRIMARY) HYPERTENSION: ICD-10-CM

## 2020-10-06 DIAGNOSIS — Z12.31 ENCOUNTER FOR SCREENING MAMMOGRAM FOR MALIGNANT NEOPLASM OF BREAST: ICD-10-CM

## 2020-10-06 DIAGNOSIS — M05.79 RHEUMATOID ARTHRITIS INVOLVING MULTIPLE SITES WITH POSITIVE RHEUMATOID FACTOR: ICD-10-CM

## 2020-10-06 DIAGNOSIS — M54.50 CHRONIC BILATERAL LOW BACK PAIN WITHOUT SCIATICA: Chronic | ICD-10-CM

## 2020-10-06 DIAGNOSIS — G89.29 CHRONIC BILATERAL LOW BACK PAIN WITHOUT SCIATICA: Chronic | ICD-10-CM

## 2020-10-06 DIAGNOSIS — E66.3 OVERWEIGHT (BMI 25.0-29.9): ICD-10-CM

## 2020-10-06 PROCEDURE — 99999 PR PBB SHADOW E&M-EST. PATIENT-LVL IV: ICD-10-PCS | Mod: PBBFAC,,, | Performed by: NURSE PRACTITIONER

## 2020-10-06 PROCEDURE — 99396 PREV VISIT EST AGE 40-64: CPT | Mod: 25,S$GLB,, | Performed by: NURSE PRACTITIONER

## 2020-10-06 PROCEDURE — 3075F PR MOST RECENT SYSTOLIC BLOOD PRESS GE 130-139MM HG: ICD-10-PCS | Mod: CPTII,S$GLB,, | Performed by: NURSE PRACTITIONER

## 2020-10-06 PROCEDURE — 3079F DIAST BP 80-89 MM HG: CPT | Mod: CPTII,S$GLB,, | Performed by: NURSE PRACTITIONER

## 2020-10-06 PROCEDURE — 99999 PR PBB SHADOW E&M-EST. PATIENT-LVL IV: CPT | Mod: PBBFAC,,, | Performed by: NURSE PRACTITIONER

## 2020-10-06 PROCEDURE — 77067 SCR MAMMO BI INCL CAD: CPT | Mod: TC,PO

## 2020-10-06 PROCEDURE — 90686 FLU VACCINE (QUAD) GREATER THAN OR EQUAL TO 3YO PRESERVATIVE FREE IM: ICD-10-PCS | Mod: S$GLB,,, | Performed by: NURSE PRACTITIONER

## 2020-10-06 PROCEDURE — 77063 BREAST TOMOSYNTHESIS BI: CPT | Mod: 26,,, | Performed by: RADIOLOGY

## 2020-10-06 PROCEDURE — 77067 MAMMO DIGITAL SCREENING BILAT WITH TOMOSYNTHESIS_CAD: ICD-10-PCS | Mod: 26,,, | Performed by: RADIOLOGY

## 2020-10-06 PROCEDURE — 77063 MAMMO DIGITAL SCREENING BILAT WITH TOMOSYNTHESIS_CAD: ICD-10-PCS | Mod: 26,,, | Performed by: RADIOLOGY

## 2020-10-06 PROCEDURE — 3075F SYST BP GE 130 - 139MM HG: CPT | Mod: CPTII,S$GLB,, | Performed by: NURSE PRACTITIONER

## 2020-10-06 PROCEDURE — 3008F BODY MASS INDEX DOCD: CPT | Mod: CPTII,S$GLB,, | Performed by: NURSE PRACTITIONER

## 2020-10-06 PROCEDURE — 3079F PR MOST RECENT DIASTOLIC BLOOD PRESSURE 80-89 MM HG: ICD-10-PCS | Mod: CPTII,S$GLB,, | Performed by: NURSE PRACTITIONER

## 2020-10-06 PROCEDURE — 90471 FLU VACCINE (QUAD) GREATER THAN OR EQUAL TO 3YO PRESERVATIVE FREE IM: ICD-10-PCS | Mod: S$GLB,,, | Performed by: NURSE PRACTITIONER

## 2020-10-06 PROCEDURE — 99396 PR PREVENTIVE VISIT,EST,40-64: ICD-10-PCS | Mod: 25,S$GLB,, | Performed by: NURSE PRACTITIONER

## 2020-10-06 PROCEDURE — 77067 SCR MAMMO BI INCL CAD: CPT | Mod: 26,,, | Performed by: RADIOLOGY

## 2020-10-06 PROCEDURE — 90686 IIV4 VACC NO PRSV 0.5 ML IM: CPT | Mod: S$GLB,,, | Performed by: NURSE PRACTITIONER

## 2020-10-06 PROCEDURE — 3008F PR BODY MASS INDEX (BMI) DOCUMENTED: ICD-10-PCS | Mod: CPTII,S$GLB,, | Performed by: NURSE PRACTITIONER

## 2020-10-06 PROCEDURE — 90471 IMMUNIZATION ADMIN: CPT | Mod: S$GLB,,, | Performed by: NURSE PRACTITIONER

## 2020-10-06 RX ORDER — AMLODIPINE BESYLATE 10 MG/1
10 TABLET ORAL DAILY
Qty: 90 TABLET | Refills: 1 | Status: SHIPPED | OUTPATIENT
Start: 2020-10-06 | End: 2021-04-08 | Stop reason: SDUPTHER

## 2020-10-06 RX ORDER — LOSARTAN POTASSIUM 50 MG/1
50 TABLET ORAL DAILY
Qty: 90 TABLET | Refills: 1 | Status: SHIPPED | OUTPATIENT
Start: 2020-10-06 | End: 2021-03-29
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
## 2020-10-08 RX ORDER — LEFLUNOMIDE 20 MG/1
20 TABLET ORAL DAILY
Qty: 30 TABLET | Refills: 0 | Status: SHIPPED | OUTPATIENT
Start: 2020-10-08 | End: 2020-11-24 | Stop reason: SDUPTHER

## 2020-10-08 RX ORDER — FOLIC ACID 1 MG/1
1000 TABLET ORAL DAILY
Qty: 90 TABLET | Refills: 0 | OUTPATIENT
Start: 2020-10-08

## 2020-10-08 RX ORDER — METHOTREXATE 2.5 MG/1
15 TABLET ORAL
Qty: 24 TABLET | Refills: 2 | OUTPATIENT
Start: 2020-10-08 | End: 2020-11-07

## 2020-10-12 ENCOUNTER — PATIENT MESSAGE (OUTPATIENT)
Dept: RHEUMATOLOGY | Facility: CLINIC | Age: 60
End: 2020-10-12

## 2020-10-12 DIAGNOSIS — M05.79 RHEUMATOID ARTHRITIS INVOLVING MULTIPLE SITES WITH POSITIVE RHEUMATOID FACTOR: Primary | ICD-10-CM

## 2020-10-13 RX ORDER — ADALIMUMAB 40MG/0.4ML
40 KIT SUBCUTANEOUS
Qty: 2 SYRINGE | Refills: 2 | Status: SHIPPED | OUTPATIENT
Start: 2020-10-13 | End: 2021-01-19 | Stop reason: SDUPTHER

## 2020-10-15 ENCOUNTER — TELEPHONE (OUTPATIENT)
Dept: PHARMACY | Facility: CLINIC | Age: 60
End: 2020-10-15

## 2020-10-16 ENCOUNTER — PATIENT MESSAGE (OUTPATIENT)
Dept: RHEUMATOLOGY | Facility: CLINIC | Age: 60
End: 2020-10-16

## 2020-10-16 DIAGNOSIS — M05.79 RHEUMATOID ARTHRITIS INVOLVING MULTIPLE SITES WITH POSITIVE RHEUMATOID FACTOR: Primary | ICD-10-CM

## 2020-10-19 RX ORDER — PREDNISONE 10 MG/1
10 TABLET ORAL DAILY
Qty: 30 TABLET | Refills: 0 | Status: SHIPPED | OUTPATIENT
Start: 2020-10-19

## 2020-10-19 NOTE — TELEPHONE ENCOUNTER
Spoke with patient on phone and foot has gone down with soaking with epsom salt and she can been knee some.  Prednisone 10 mg daily.

## 2020-10-21 ENCOUNTER — SPECIALTY PHARMACY (OUTPATIENT)
Dept: PHARMACY | Facility: CLINIC | Age: 60
End: 2020-10-21

## 2020-10-21 DIAGNOSIS — M05.79 RHEUMATOID ARTHRITIS INVOLVING MULTIPLE SITES WITH POSITIVE RHEUMATOID FACTOR: Primary | ICD-10-CM

## 2020-10-23 ENCOUNTER — PATIENT MESSAGE (OUTPATIENT)
Dept: PHARMACY | Facility: CLINIC | Age: 60
End: 2020-10-23

## 2020-10-27 ENCOUNTER — PATIENT MESSAGE (OUTPATIENT)
Dept: RHEUMATOLOGY | Facility: CLINIC | Age: 60
End: 2020-10-27

## 2020-10-30 ENCOUNTER — PATIENT MESSAGE (OUTPATIENT)
Dept: PHARMACY | Facility: CLINIC | Age: 60
End: 2020-10-30

## 2020-10-30 ENCOUNTER — PATIENT MESSAGE (OUTPATIENT)
Dept: RHEUMATOLOGY | Facility: CLINIC | Age: 60
End: 2020-10-30

## 2020-11-02 ENCOUNTER — PATIENT MESSAGE (OUTPATIENT)
Dept: RHEUMATOLOGY | Facility: CLINIC | Age: 60
End: 2020-11-02

## 2020-11-02 ENCOUNTER — SPECIALTY PHARMACY (OUTPATIENT)
Dept: PHARMACY | Facility: CLINIC | Age: 60
End: 2020-11-02

## 2020-11-02 NOTE — TELEPHONE ENCOUNTER
Specialty Pharmacy - Initial Clinical Assessment    Specialty Medication Orders Linked to Encounter      Most Recent Value   Medication #1  adalimumab (HUMIRA,CF,) 40 mg/0.4 mL SyKt (Order#[id number], Rx#[id number])        Subjective    Kendra Griffin is a 60 y.o. female, who is followed by the specialty pharmacy service for management and education.    Encounters since last clinical assessment   10/21/2020: Referral Authorization with Heidi Gayle PharmD   Clinical call attempts since last clinical assessment   10/23/2020  7:09 PM - Specialty Pharmacy - Clinical Assessment by Heidi Gayle PharmD  10/30/2020  2:00 PM - Specialty Pharmacy - Clinical Assessment by Heidi Gayle PharmD     Today she received education before her first fill with [facility].    Current Outpatient Medications   Medication Sig    adalimumab (HUMIRA,CF,) 40 mg/0.4 mL SyKt Inject 0.4 mLs (40 mg total) into the skin every 14 (fourteen) days.    amLODIPine (NORVASC) 10 MG tablet Take 1 tablet (10 mg total) by mouth once daily.    hydrOXYzine pamoate (VISTARIL) 25 MG Cap Take 1-2 tabs PO QHS PRN insomnia or panic attacks    leflunomide (ARAVA) 20 MG Tab Take 1 tablet (20 mg total) by mouth once daily.    losartan (COZAAR) 50 MG tablet Take 1 tablet (50 mg total) by mouth once daily.    naproxen (NAPROSYN) 500 MG tablet Take 1 tablet (500 mg total) by mouth 2 (two) times daily with meals.    pantoprazole (PROTONIX) 20 MG tablet Take 1 tablet (20 mg total) by mouth once daily.    predniSONE (DELTASONE) 10 MG tablet Take 1 tablet (10 mg total) by mouth once daily.    varicella-zoster gE-AS01B, PF, (SHINGRIX, PF,) 50 mcg/0.5 mL injection Inject into the muscle. (Patient not taking: Reported on 10/6/2020)   Last reviewed on 11/2/2020  2:57 PM by Heidi Gayle PharmD    Review of patient's allergies indicates:  No Known AllergiesLast reviewed on  11/2/2020 2:56 PM by Heidi Gayle        Adverse Effects    Back  pain: Pos  Joint swelling: Neg       Assessment Questions - Documented Responses      Most Recent Value   Assessment   Medication Reconciliation completed for patient  Yes   During the past 4 weeks, has patient missed any activities due to condition or medication?  No   During the past 4 weeks, did patient have any of the following urgent care visits?  None   Goals of Therapy Status  Discussed (new start)   Welcome packet contents reviewed and discussed with patient?  Yes   Assesment completed?  Yes   Plan  Therapy being initiated   Do you need to open a clinical intervention (i-vent)?  No   Do you want to schedule first shipment?  Yes   Medication #1 Assessment Info   Patient status  New medication   Is this medication appropriate for the patient?  Yes   Is this medication effective?  Not yet started        Refill Questions - Documented Responses      Most Recent Value   Relationship to patient of person spoken to?  Self   HIPAA/medical authority confirmed?  Yes   Can the patient store medication/sharps container properly (at the correct temperature, away from children/pets, etc.)?  Yes   Can the patient call emergency services (911) in the event of an emergency?  Yes   Does the patient have any concerns or questions about taking or administering this medication as prescribed?  No   How will the patient receive the medication?  Pickup   When does the patient need to receive the medication?  11/02/20   Expected Copay ($)  5   Is the patient able to afford the medication copay?  Yes   Payment Method  at pickup   Days supply of Refill  28   Refill activity completed?  Yes   Refill activity plan  Refill scheduled   Shipment/Pickup Date:  11/02/20          Objective    She has a past medical history of Arthritis, Gastric ulcer due to Helicobacter pylori (2008), Hypertension, and Microcytic anemia (6/19/2015).      BP Readings from Last 4 Encounters:   10/06/20 138/82   07/09/20 130/84   07/07/20 (!) 151/92   06/23/20  "(!) 146/87     Ht Readings from Last 4 Encounters:   10/06/20 5' 7" (1.702 m)   07/09/20 5' 7" (1.702 m)   06/23/20 5' 7" (1.702 m)   06/03/20 5' 7.5" (1.715 m)     Wt Readings from Last 4 Encounters:   10/06/20 90.2 kg (198 lb 13.7 oz)   07/09/20 94.8 kg (209 lb)   07/07/20 95.2 kg (209 lb 14.1 oz)   06/23/20 84.4 kg (186 lb)     Recent Labs   Lab Result Units 09/30/20  0948 08/12/20  0609   Creatinine mg/dL 0.66 0.66   ALT U/L 30 19   AST U/L 31 29   Hepatitis B Surface Ag  Negative  --    Hep B S Ab  Positive A  --    Hep B Core Total Ab  Negative  --      The goals of rheumatoid arthritis treatment include:  · Relieving pain and suppressing inflammation  · Achieving remission and preventing joint and organ damage  · Increasing joint mobility and strength  · Preventing infection and other complications of treatment  · Reducing long term complications of rheumatoid arthritis  · Improving or maintaining physical function and optimal well-being  · Improving or maintaining quality of life  · Maintaining optimal therapy adherence  · Minimizing and managing side effects    Goals of Therapy Status: Discussed (new start)    Assessment/Plan  Patient plans to start therapy on 11/02/20      Indication, dosage, appropriateness, effectiveness, safety and convenience of her specialty medication(s) were reviewed today.     Patient Counseling    Counseled the patient on the following: doses and administration discussed, safe handling, storage, and disposal discussed, possible adverse effects and management discussed, possible drug and prescription drug interactions discussed, possible drug and OTC drug and food interactions discussed, lab monitoring and follow-up discussed, therapeutic rationale discussed, cost of medications and cost implications discussed, adherence and missed doses discussed, pharmacy contact information discussed       Humira initial consult completed in-person. Pt plans to start Humira today when she goes " "home. Pt is nervous about self-injection, but feels confident she can do it. Provided pt with Humira nurse ambassador phone number and advised her to watch injection video online before injecting. Pt reported pain in her legs and lower back. She stated the pain in her legs makes it hard to sleep. Pt reported no joint swelling at this time. Pt reported her morning stiffness improves in 1 hour. Pt reported bent fingers and muscle weakness in her hands that causes her to drop things sometimes. Pt rated her pin as 5/10 and described her overall health as "not good". Pt was working as a  but hasn't been working since August 2020 due to her RA. Pt reported no missed plans or visits to ER/urgent care in the last month due to symptoms. Medication list was verified against Epic and pt is not taking aspirin, folic acid, MTX, robaxin, zanaflex, or protonix - d/c.        Infection precautions reviewed with pt. Confirmed pt is practicing proper precautions with COVID and wearing a mask outdoors. Counseled pt to hold dose if ill. Dosage, storage, and administration reviewed with pt. Informed pt Humira is stable at room temperature for 14 days. How to handle missed doses reviewed. Advised pt to use a calendar to keep track of injection days. Injection sites reviewed with pt, as well as rotating injection sites. Injection training reviewed with pt. Pt voiced understanding. Common side effects reviewed with pt, as well as injection site reaction management. Serious side effects to report to MD reviewed with pt. Warnings/precautions discussed - avoiding live vaccines, lupus-like syndrome, malignancy/lymphoma, risk of serious infection, signs/symptoms of liver abnormalities. Reiterated importance of keeping lab appts. Pt verbalized understanding. [facility] refill process and services explained to pt. [facility] will call in 3 weeks for refill. Pt verbalized understanding. Pt had no further questions or concerns and was encouraged " to call [facility] should any arise.    QOL (mHAQ) responses:  (1) Dress yourself, including tying shoelaces and doing buttons?  Without difficulty, just takes time  (2) Get in and out of bed? With some difficulty  (3) Lift a full cup or glass to your mouth? Without difficulty - uses paper cups  (4) Walk outdoors on flat ground? Without difficulty  (5) Wash and dry your entire body? Without difficulty   (6) Bend down to  clothing from the floor?  With some difficulty   (7) Turn regular faucets (taps) on and off?  Without difficulty  (8) Get in and out of a car?  Without difficulty    Tasks added this encounter   11/23/2020 - Refill Call (Auto Added)  1/24/2021 - Clinical - Follow Up Assesement (90 day)   Tasks due within next 3 months   No tasks due.     Heidi Gayle, PharmD  [facility]  [address] [place] [id number]  Phone: [phone]  Fax: [phone]

## 2020-11-03 ENCOUNTER — PATIENT MESSAGE (OUTPATIENT)
Dept: RHEUMATOLOGY | Facility: CLINIC | Age: 60
End: 2020-11-03

## 2020-11-04 ENCOUNTER — PATIENT MESSAGE (OUTPATIENT)
Dept: RHEUMATOLOGY | Facility: CLINIC | Age: 60
End: 2020-11-04

## 2020-11-09 ENCOUNTER — PATIENT MESSAGE (OUTPATIENT)
Dept: RHEUMATOLOGY | Facility: CLINIC | Age: 60
End: 2020-11-09

## 2020-11-10 NOTE — TELEPHONE ENCOUNTER
Spoke with patient on phone.  Voya form from disability dept at [facility] completed with patient assistance and emailed back to disability dept at [facility].  Patient to contact office if any further issues.

## 2020-11-14 NOTE — PROGRESS NOTES
Subjective:       Patient ID: Knedra Griffin is a 59 y.o. female.    Chief Complaint: Neck Pain (Cervical)    RV, F/U from [facility]- Neck injury (DOI 6/2/2020) [facility] Board- Patient states she was moving furniture back into a classroom and went to get a flat courtney. She grabbed the courtney with her feet and the courtney rolled fast, slipped from under her and she fell backwards on her neck and back. She complains of a constant tightness and pulling pain around her neck. She had x-rays at , no medication given. Pain level 6/10 on today. She is currently taking Tylenol. [place]    Neck Pain    This is a recurrent problem. The current episode started yesterday. The problem occurs constantly. The problem has been gradually worsening. The pain is associated with a fall. The pain is at a severity of 6/10. The pain is moderate. The symptoms are aggravated by twisting and position. The pain is same all the time. Stiffness is present in the morning. Pertinent negatives include no chest pain, fever, headaches, leg pain, numbness, pain with swallowing, paresis, photophobia, syncope, tingling, trouble swallowing, visual change, weakness or weight loss. She has tried acetaminophen for the symptoms. The treatment provided no relief.       Constitution: Negative for fatigue and fever.   HENT: Negative for trouble swallowing.    Neck: Positive for neck pain and neck stiffness.   Cardiovascular: Negative for chest pain and passing out.   Eyes: Negative for photophobia.   Respiratory: Negative.    Gastrointestinal: Negative for abdominal pain and bowel incontinence.   Endocrine: negative.   Genitourinary: Negative for dysuria, urgency, bladder incontinence and hematuria.   Musculoskeletal: Positive for pain, trauma and muscle ache. Negative for joint pain, joint swelling, abnormal ROM of joint, back pain, muscle cramps and history of spine disorder.   Skin: Negative for rash.   Allergic/Immunologic: Negative.     Neurological: Negative for coordination disturbances, headaches, numbness and tingling.   Hematologic/Lymphatic: Negative.    Psychiatric/Behavioral: Negative.         Objective:      Physical Exam   Constitutional: She appears well-developed and well-nourished. She is active. No distress.   HENT:   Head: Normocephalic and atraumatic. Head is without raccoon's eyes and without Brown's sign.   Right Ear: Hearing and external ear normal.   Left Ear: Hearing and external ear normal.   Nose: Nose normal. No nasal deformity. No epistaxis.   Mouth/Throat: Oropharynx is clear and moist.   Eyes: Conjunctivae and lids are normal. No scleral icterus.   Neck: Trachea normal. Muscular tenderness present. No spinous process tenderness present. Decreased range of motion present.       Cardiovascular: Intact distal pulses and normal pulses.   Pulses:       Radial pulses are 2+ on the right side, and 2+ on the left side.   Pulmonary/Chest: Effort normal. No stridor. No respiratory distress.   Musculoskeletal:        Cervical back: She exhibits decreased range of motion and tenderness. She exhibits no deformity.   Neurological: She is alert. She has normal strength and normal reflexes. No sensory deficit. GCS eye subscore is 4. GCS verbal subscore is 5. GCS motor subscore is 6.   Reflex Scores:       Tricep reflexes are 2+ on the right side and 2+ on the left side.       Bicep reflexes are 2+ on the right side and 2+ on the left side.       Brachioradialis reflexes are 2+ on the right side and 2+ on the left side.  Skin: Skin is warm, dry and intact. She is not diaphoretic.   Psychiatric: She has a normal mood and affect. Her speech is normal and behavior is normal. She is attentive.   Nursing note and vitals reviewed.        X-ray Cervical Spine 2 Or 3 Views    Result Date: 6/2/2020  EXAMINATION: XR CERVICAL SPINE 2 OR 3 VIEWS CLINICAL HISTORY: Cervicalgia FINDINGS: There is mild-moderate DJD between C3 and T1.  The odontoid  t5-t6 laminectomy, t4-10 posterior fusion prevertebral soft tissues and posterior elements are intact.  No fracture dislocation bone destruction seen.  No trauma seen.     No acute process seen. Electronically signed by: Hermilo Barr MD Date:    06/02/2020 Time:    12:35    Assessment:       1. Cervical strain, acute, initial encounter    2. Work related injury        Plan:         Medications Ordered This Encounter   Medications    ibuprofen (ADVIL,MOTRIN) 600 MG tablet     Sig: Take 1 tablet (600 mg total) by mouth 3 (three) times daily. Take with meals.     Dispense:  30 tablet     Refill:  0    ketorolac injection 60 mg    tiZANidine (ZANAFLEX) 4 MG tablet     Sig: Take 1 tablet (4 mg total) by mouth 2 (two) times daily as needed (muscle spasms). Take off duty only. May cause drowsiness.     Dispense:  20 tablet     Refill:  0         Restrictions: Home today, Regular Duty  Follow up in about 1 week (around 6/10/2020).     Patient Instructions       Understanding Cervical Strain    There are 7 bones (vertebrae) in the neck that are part of the spine. These are called the cervical spine. Cervical strain is a medical term for neck pain. The neck has several layers of muscles. These are connected with tendons to the cervical spine and other bones. Neck pain is often the result of injury to these muscles and tendons.  Causes of cervical strain  Different types of stress on the neck can damage muscles and tendons (soft tissues) and cause cervical strain. Cervical tissues can be damaged by:  · The neck being forced past its normal range of motion, such as in a car accident or sports injury  · Constant, low-level stress, such as from poor posture or a poorly set-up workspace  Symptoms of cervical strain  These may include:  · Neck pain or stiffness  · Pain in the shoulders or upper back  · Muscle spasms  · Headache, often starting at the base of the neck  · Irritability, difficulty concentrating, or sleeplessness  Treatment for cervical strain  This  problem often gets better on its own. Treatments aim to reduce pain and inflammation and increase the range of motion of the neck. Possible treatments include:  · Over-the-counter or prescription pain medicine. These help relieve pain and inflammation.  · Stretching exercises to decrease neck stiffness.  · Massage to decrease neck stiffness.  · Cold or heat pack. These help reduce pain and swelling.  Call 911  Call emergency services right away if you have any of these:  · Face drooping or numbness  · Numbness or weakness, especially in the arms or on one side  · Slurred speech or difficulty speaking  · Blurred vision   When to call your healthcare provider  Call your healthcare provider right away if you have any of these:  · Fever of 100.4°F (38°C) or higher, or as directed  · Pain or stiffness that gets worse  · Symptoms that dont get better, or get worse  · Numbness, tingling, weakness or shooting pains into the arms or legs  · New symptoms  Date Last Reviewed: 3/10/2016  © 1955-1739 [employer]. [address] [place] [address], [place] [address]. All rights reserved. This information is not intended as a substitute for professional medical care. Always follow your healthcare professional's instructions.

## 2020-11-23 ENCOUNTER — PATIENT MESSAGE (OUTPATIENT)
Dept: RHEUMATOLOGY | Facility: CLINIC | Age: 60
End: 2020-11-23

## 2020-11-23 ENCOUNTER — PATIENT MESSAGE (OUTPATIENT)
Dept: FAMILY MEDICINE | Facility: CLINIC | Age: 60
End: 2020-11-23

## 2020-11-24 ENCOUNTER — SPECIALTY PHARMACY (OUTPATIENT)
Dept: PHARMACY | Facility: CLINIC | Age: 60
End: 2020-11-24

## 2020-11-24 DIAGNOSIS — R53.83 FATIGUE, UNSPECIFIED TYPE: ICD-10-CM

## 2020-11-24 DIAGNOSIS — D84.9 IMMUNOSUPPRESSION: ICD-10-CM

## 2020-11-24 DIAGNOSIS — M05.79 RHEUMATOID ARTHRITIS INVOLVING MULTIPLE SITES WITH POSITIVE RHEUMATOID FACTOR: ICD-10-CM

## 2020-11-24 DIAGNOSIS — M05.79 RHEUMATOID ARTHRITIS INVOLVING MULTIPLE SITES WITH POSITIVE RHEUMATOID FACTOR: Primary | ICD-10-CM

## 2020-11-24 NOTE — TELEPHONE ENCOUNTER
Specialty Pharmacy - Refill Coordination    Specialty Medication Orders Linked to Encounter      Most Recent Value   Medication #1  adalimumab (HUMIRA,CF,) 40 mg/0.4 mL SyKt (Order#[id number], Rx#[id number])          Refill Questions - Documented Responses      Most Recent Value   Relationship to patient of person spoken to?  Self   HIPAA/medical authority confirmed?  Yes   Any changes in contact preferences or allowed representatives?  No   Has the patient had any insurance changes?  No   Has the patient had any changes to specialty medication, dose, or instructions?  No   Has the patient started taking any new medications, herbals, or supplements?  No   Has the patient been diagnosed with any new medical conditions?  No   Does the patient have any new allergies to medications or foods?  No   Does the patient have any concerns about side effects?  No   Can the patient store medication/sharps container properly (at the correct temperature, away from children/pets, etc.)?  Yes   Can the patient call emergency services (911) in the event of an emergency?  Yes   Does the patient have any concerns or questions about taking or administering this medication as prescribed?  No   How many doses did the patient miss in the past 4 weeks or since the last fill?  0   How many doses does the patient have on hand?  0   Does the number of doses/days supply remaining match pharmacy expected amounts?  Yes   How will the patient receive the medication?  Mail   When does the patient need to receive the medication?  11/30/20   Shipping Address  Home   Address in [facility] confirmed and updated if neccessary?  Yes   Expected Copay ($)  5   Is the patient able to afford the medication copay?  Yes   Payment Method  CC on file   Days supply of Refill  28   Would patient like to speak to a pharmacist?  No   Do you want to trigger an intervention?  No   Do you want to trigger an additional referral task?  No   Refill activity  completed?  Yes   Refill activity plan  Refill scheduled   Shipment/Pickup Date:  20          Current Outpatient Medications   Medication Sig    adalimumab (HUMIRA,CF,) 40 mg/0.4 mL SyKt Inject 0.4 mLs (40 mg total) into the skin every 14 (fourteen) days.    amLODIPine (NORVASC) 10 MG tablet Take 1 tablet (10 mg total) by mouth once daily.    hydrOXYzine pamoate (VISTARIL) 25 MG Cap Take 1-2 tabs PO QHS PRN insomnia or panic attacks    leflunomide (ARAVA) 20 MG Tab Take 1 tablet (20 mg total) by mouth once daily.    losartan (COZAAR) 50 MG tablet Take 1 tablet (50 mg total) by mouth once daily.    naproxen (NAPROSYN) 500 MG tablet Take 1 tablet (500 mg total) by mouth 2 (two) times daily with meals.    pantoprazole (PROTONIX) 20 MG tablet Take 1 tablet (20 mg total) by mouth once daily.    predniSONE (DELTASONE) 10 MG tablet Take 1 tablet (10 mg total) by mouth once daily.    varicella-zoster gE-AS01B, PF, (SHINGRIX, PF,) 50 mcg/0.5 mL injection Inject into the muscle. (Patient not taking: Reported on 10/6/2020)   Last reviewed on 2020  2:57 PM by Heidi Gayle PharmD    Review of patient's allergies indicates:  No Known Allergies Last reviewed on  2020 2:56 PM by Heidi Gayle    Pt confirmed shipping of Humira on  to arrive on . Address and  verified. $5 copay in 004, [facility]. Pt is not in need of a new sharps container. Pt has 0 doses on hand, next dose due . Pt reported no missed doses. Pt did not start any new medications. Pt had no further questions or concerns.    Tasks added this encounter   2020 - Refill Call (Auto Added)   Tasks due within next 3 months   2021 - Clinical - Follow Up Assesement (90 day)     Heidi Gayle PharmD  [facility]  [address] [place] [id number]  Phone: [phone]  Fax: [phone]

## 2020-11-25 RX ORDER — LEFLUNOMIDE 20 MG/1
20 TABLET ORAL DAILY
Qty: 30 TABLET | Refills: 0 | Status: SHIPPED | OUTPATIENT
Start: 2020-11-25 | End: 2020-11-30 | Stop reason: SDUPTHER

## 2020-11-30 ENCOUNTER — PATIENT MESSAGE (OUTPATIENT)
Dept: RHEUMATOLOGY | Facility: CLINIC | Age: 60
End: 2020-11-30

## 2020-11-30 DIAGNOSIS — R53.83 FATIGUE, UNSPECIFIED TYPE: ICD-10-CM

## 2020-11-30 DIAGNOSIS — M05.79 RHEUMATOID ARTHRITIS INVOLVING MULTIPLE SITES WITH POSITIVE RHEUMATOID FACTOR: ICD-10-CM

## 2020-11-30 DIAGNOSIS — D84.9 IMMUNOSUPPRESSION: ICD-10-CM

## 2020-12-01 ENCOUNTER — PATIENT MESSAGE (OUTPATIENT)
Dept: RHEUMATOLOGY | Facility: CLINIC | Age: 60
End: 2020-12-01

## 2020-12-01 RX ORDER — LEFLUNOMIDE 20 MG/1
20 TABLET ORAL DAILY
Qty: 30 TABLET | Refills: 0 | Status: SHIPPED | OUTPATIENT
Start: 2020-12-01 | End: 2021-02-25 | Stop reason: SDUPTHER

## 2020-12-08 ENCOUNTER — PATIENT MESSAGE (OUTPATIENT)
Dept: RHEUMATOLOGY | Facility: CLINIC | Age: 60
End: 2020-12-08

## 2020-12-23 ENCOUNTER — PATIENT MESSAGE (OUTPATIENT)
Dept: PHARMACY | Facility: CLINIC | Age: 60
End: 2020-12-23

## 2020-12-24 ENCOUNTER — SPECIALTY PHARMACY (OUTPATIENT)
Dept: PHARMACY | Facility: CLINIC | Age: 60
End: 2020-12-24

## 2020-12-24 NOTE — TELEPHONE ENCOUNTER
Specialty Pharmacy - Refill Coordination    Specialty Medication Orders Linked to Encounter      Most Recent Value   Medication #1  adalimumab (HUMIRA,CF,) 40 mg/0.4 mL SyKt (Order#[id number], Rx#[id number])          Refill Questions - Documented Responses      Most Recent Value   Relationship to patient of person spoken to?  Self   HIPAA/medical authority confirmed?  Yes   Any changes in contact preferences or allowed representatives?  No   Has the patient had any insurance changes?  No   Has the patient had any changes to specialty medication, dose, or instructions?  No   Has the patient started taking any new medications, herbals, or supplements?  No   Has the patient been diagnosed with any new medical conditions?  No   Does the patient have any new allergies to medications or foods?  No   Does the patient have any concerns about side effects?  No   Can the patient store medication/sharps container properly (at the correct temperature, away from children/pets, etc.)?  Yes   Can the patient call emergency services (911) in the event of an emergency?  Yes   Does the patient have any concerns or questions about taking or administering this medication as prescribed?  No   How many doses did the patient miss in the past 4 weeks or since the last fill?  0   How many doses does the patient have on hand?  0   Does the number of doses/days supply remaining match pharmacy expected amounts?  Yes   Does the patient feel that this medication is effective?  Yes   During the past 4 weeks, has patient missed any activities due to condition or medication?  No   During the past 4 weeks, did patient have any of the following urgent care visits?  None   How will the patient receive the medication?  Mail   When does the patient need to receive the medication?  12/28/20   Shipping Address  Home   Address in [place] confirmed and updated if neccessary?  Yes   Expected Copay ($)  5   Is the patient able to afford the  medication copay?  Yes   Payment Method  CC on file   Days supply of Refill  28   Would patient like to speak to a pharmacist?  No   Do you want to trigger an intervention?  No   Do you want to trigger an additional referral task?  No   Refill activity completed?  Yes   Refill activity plan  Refill scheduled   Shipment/Pickup Date:  12/28/20          Current Outpatient Medications   Medication Sig    adalimumab (HUMIRA,CF,) 40 mg/0.4 mL SyKt Inject 0.4 mLs (40 mg total) into the skin every 14 (fourteen) days.    amLODIPine (NORVASC) 10 MG tablet Take 1 tablet (10 mg total) by mouth once daily.    hydrOXYzine pamoate (VISTARIL) 25 MG Cap Take 1-2 tabs PO QHS PRN insomnia or panic attacks    leflunomide (ARAVA) 20 MG Tab Take 1 tablet (20 mg total) by mouth once daily.    losartan (COZAAR) 50 MG tablet Take 1 tablet (50 mg total) by mouth once daily.    naproxen (NAPROSYN) 500 MG tablet Take 1 tablet (500 mg total) by mouth 2 (two) times daily with meals.    pantoprazole (PROTONIX) 20 MG tablet Take 1 tablet (20 mg total) by mouth once daily.    predniSONE (DELTASONE) 10 MG tablet Take 1 tablet (10 mg total) by mouth once daily.    varicella-zoster gE-AS01B, PF, (SHINGRIX, PF,) 50 mcg/0.5 mL injection Inject into the muscle. (Patient not taking: Reported on 10/6/2020)   Last reviewed on 12/24/2020  9:18 AM by Heidi Wing    Review of patient's allergies indicates:  No Known Allergies Last reviewed on  12/24/2020 9:18 AM by Heidi Wing      Tasks added this encounter   1/16/2021 - Refill Call (Auto Added)   Tasks due within next 3 months   1/24/2021 - Clinical - Follow Up Assesement (90 day)     Erin Wing  [facility]  [address] [place] [id number]  Phone: [phone]  Fax: [phone]

## 2020-12-27 ENCOUNTER — PATIENT MESSAGE (OUTPATIENT)
Dept: RHEUMATOLOGY | Facility: CLINIC | Age: 60
End: 2020-12-27

## 2020-12-27 DIAGNOSIS — M05.79 RHEUMATOID ARTHRITIS INVOLVING MULTIPLE SITES WITH POSITIVE RHEUMATOID FACTOR: Primary | ICD-10-CM

## 2020-12-27 DIAGNOSIS — R53.83 FATIGUE, UNSPECIFIED TYPE: ICD-10-CM

## 2020-12-27 DIAGNOSIS — D84.9 IMMUNOSUPPRESSION: ICD-10-CM

## 2020-12-28 ENCOUNTER — PATIENT MESSAGE (OUTPATIENT)
Dept: RHEUMATOLOGY | Facility: CLINIC | Age: 60
End: 2020-12-28

## 2020-12-30 ENCOUNTER — LAB VISIT (OUTPATIENT)
Dept: LAB | Facility: HOSPITAL | Age: 60
End: 2020-12-30
Attending: INTERNAL MEDICINE
Payer: OTHER GOVERNMENT

## 2020-12-30 DIAGNOSIS — M05.79 RHEUMATOID ARTHRITIS INVOLVING MULTIPLE SITES WITH POSITIVE RHEUMATOID FACTOR: ICD-10-CM

## 2020-12-30 DIAGNOSIS — R53.83 FATIGUE, UNSPECIFIED TYPE: ICD-10-CM

## 2020-12-30 DIAGNOSIS — D84.9 IMMUNOSUPPRESSION: ICD-10-CM

## 2020-12-30 LAB
ALBUMIN SERPL BCP-MCNC: 3.8 G/DL (ref 3.5–5.2)
ALP SERPL-CCNC: 107 U/L (ref 55–135)
ALT SERPL W/O P-5'-P-CCNC: 38 U/L (ref 10–44)
ANION GAP SERPL CALC-SCNC: 11 MMOL/L (ref 8–16)
AST SERPL-CCNC: 26 U/L (ref 10–40)
BASOPHILS # BLD AUTO: 0.03 K/UL (ref 0–0.2)
BASOPHILS NFR BLD: 0.6 % (ref 0–1.9)
BILIRUB SERPL-MCNC: 0.3 MG/DL (ref 0.1–1)
BUN SERPL-MCNC: 9 MG/DL (ref 6–20)
CALCIUM SERPL-MCNC: 9.4 MG/DL (ref 8.7–10.5)
CHLORIDE SERPL-SCNC: 104 MMOL/L (ref 95–110)
CO2 SERPL-SCNC: 26 MMOL/L (ref 23–29)
CREAT SERPL-MCNC: 0.8 MG/DL (ref 0.5–1.4)
CRP SERPL-MCNC: 1.5 MG/L (ref 0–8.2)
DIFFERENTIAL METHOD: ABNORMAL
EOSINOPHIL # BLD AUTO: 0.3 K/UL (ref 0–0.5)
EOSINOPHIL NFR BLD: 5.3 % (ref 0–8)
ERYTHROCYTE [DISTWIDTH] IN BLOOD BY AUTOMATED COUNT: 17 % (ref 11.5–14.5)
ERYTHROCYTE [SEDIMENTATION RATE] IN BLOOD BY WESTERGREN METHOD: 24 MM/HR (ref 0–36)
EST. GFR  (AFRICAN AMERICAN): >60 ML/MIN/1.73 M^2
EST. GFR  (NON AFRICAN AMERICAN): >60 ML/MIN/1.73 M^2
GLUCOSE SERPL-MCNC: 108 MG/DL (ref 70–110)
HCT VFR BLD AUTO: 40.8 % (ref 37–48.5)
HGB BLD-MCNC: 11.4 G/DL (ref 12–16)
IMM GRANULOCYTES # BLD AUTO: 0.01 K/UL (ref 0–0.04)
IMM GRANULOCYTES NFR BLD AUTO: 0.2 % (ref 0–0.5)
LYMPHOCYTES # BLD AUTO: 1.5 K/UL (ref 1–4.8)
LYMPHOCYTES NFR BLD: 30.8 % (ref 18–48)
MCH RBC QN AUTO: 22.4 PG (ref 27–31)
MCHC RBC AUTO-ENTMCNC: 27.9 G/DL (ref 32–36)
MCV RBC AUTO: 80 FL (ref 82–98)
MONOCYTES # BLD AUTO: 0.4 K/UL (ref 0.3–1)
MONOCYTES NFR BLD: 9 % (ref 4–15)
NEUTROPHILS # BLD AUTO: 2.7 K/UL (ref 1.8–7.7)
NEUTROPHILS NFR BLD: 54.1 % (ref 38–73)
NRBC BLD-RTO: 0 /100 WBC
PLATELET # BLD AUTO: 246 K/UL (ref 150–350)
PMV BLD AUTO: 10.7 FL (ref 9.2–12.9)
POTASSIUM SERPL-SCNC: 3.9 MMOL/L (ref 3.5–5.1)
PROT SERPL-MCNC: 7.1 G/DL (ref 6–8.4)
RBC # BLD AUTO: 5.1 M/UL (ref 4–5.4)
SODIUM SERPL-SCNC: 141 MMOL/L (ref 136–145)
WBC # BLD AUTO: 4.91 K/UL (ref 3.9–12.7)

## 2020-12-30 PROCEDURE — 86140 C-REACTIVE PROTEIN: CPT

## 2020-12-30 PROCEDURE — 85025 COMPLETE CBC W/AUTO DIFF WBC: CPT

## 2020-12-30 PROCEDURE — 36415 COLL VENOUS BLD VENIPUNCTURE: CPT | Mod: PO

## 2020-12-30 PROCEDURE — 85652 RBC SED RATE AUTOMATED: CPT

## 2020-12-30 PROCEDURE — 80053 COMPREHEN METABOLIC PANEL: CPT

## 2020-12-31 ENCOUNTER — TELEPHONE (OUTPATIENT)
Dept: RHEUMATOLOGY | Facility: CLINIC | Age: 60
End: 2020-12-31

## 2020-12-31 ENCOUNTER — PATIENT MESSAGE (OUTPATIENT)
Dept: RHEUMATOLOGY | Facility: CLINIC | Age: 60
End: 2020-12-31

## 2020-12-31 NOTE — TELEPHONE ENCOUNTER
Called pt per dr gallagher to see if she could come in or do a virtual appt on feb 10 am she didn't answer and did not have a voicemail box

## 2020-12-31 NOTE — TELEPHONE ENCOUNTER
Staff to schedule follow-up visit for this patient.  Disability forms completed.  Double book February 1st at 10:00 a.m..

## 2021-01-19 ENCOUNTER — PATIENT MESSAGE (OUTPATIENT)
Dept: PHARMACY | Facility: CLINIC | Age: 61
End: 2021-01-19

## 2021-01-19 DIAGNOSIS — M05.79 RHEUMATOID ARTHRITIS INVOLVING MULTIPLE SITES WITH POSITIVE RHEUMATOID FACTOR: ICD-10-CM

## 2021-01-19 RX ORDER — ADALIMUMAB 40MG/0.4ML
40 KIT SUBCUTANEOUS
Qty: 2 SYRINGE | Refills: 2 | Status: SHIPPED | OUTPATIENT
Start: 2021-01-19 | End: 2021-04-10 | Stop reason: SDUPTHER

## 2021-01-20 DIAGNOSIS — Z12.11 COLON CANCER SCREENING: ICD-10-CM

## 2021-01-23 ENCOUNTER — SPECIALTY PHARMACY (OUTPATIENT)
Dept: PHARMACY | Facility: CLINIC | Age: 61
End: 2021-01-23

## 2021-02-16 ENCOUNTER — PATIENT MESSAGE (OUTPATIENT)
Dept: RHEUMATOLOGY | Facility: CLINIC | Age: 61
End: 2021-02-16

## 2021-02-18 ENCOUNTER — PATIENT MESSAGE (OUTPATIENT)
Dept: RHEUMATOLOGY | Facility: CLINIC | Age: 61
End: 2021-02-18

## 2021-02-18 ENCOUNTER — SPECIALTY PHARMACY (OUTPATIENT)
Dept: PHARMACY | Facility: CLINIC | Age: 61
End: 2021-02-18

## 2021-02-23 ENCOUNTER — PATIENT MESSAGE (OUTPATIENT)
Dept: RHEUMATOLOGY | Facility: CLINIC | Age: 61
End: 2021-02-23

## 2021-02-24 ENCOUNTER — PATIENT MESSAGE (OUTPATIENT)
Dept: RHEUMATOLOGY | Facility: CLINIC | Age: 61
End: 2021-02-24

## 2021-02-25 ENCOUNTER — OFFICE VISIT (OUTPATIENT)
Dept: RHEUMATOLOGY | Facility: CLINIC | Age: 61
End: 2021-02-25
Payer: COMMERCIAL

## 2021-02-25 ENCOUNTER — PATIENT OUTREACH (OUTPATIENT)
Dept: ADMINISTRATIVE | Facility: OTHER | Age: 61
End: 2021-02-25

## 2021-02-25 DIAGNOSIS — R53.83 FATIGUE, UNSPECIFIED TYPE: ICD-10-CM

## 2021-02-25 DIAGNOSIS — M05.79 RHEUMATOID ARTHRITIS INVOLVING MULTIPLE SITES WITH POSITIVE RHEUMATOID FACTOR: Primary | ICD-10-CM

## 2021-02-25 DIAGNOSIS — D84.9 IMMUNOSUPPRESSION: ICD-10-CM

## 2021-02-25 PROCEDURE — 99214 PR OFFICE/OUTPT VISIT, EST, LEVL IV, 30-39 MIN: ICD-10-PCS | Mod: 95,,, | Performed by: INTERNAL MEDICINE

## 2021-02-25 PROCEDURE — 99214 OFFICE O/P EST MOD 30 MIN: CPT | Mod: 95,,, | Performed by: INTERNAL MEDICINE

## 2021-02-25 PROCEDURE — 1125F AMNT PAIN NOTED PAIN PRSNT: CPT | Mod: ,,, | Performed by: INTERNAL MEDICINE

## 2021-02-25 PROCEDURE — 1125F PR PAIN SEVERITY QUANTIFIED, PAIN PRESENT: ICD-10-PCS | Mod: ,,, | Performed by: INTERNAL MEDICINE

## 2021-02-25 RX ORDER — LEVOCETIRIZINE DIHYDROCHLORIDE 5 MG/1
TABLET, FILM COATED ORAL
COMMUNITY
Start: 2021-01-29

## 2021-02-25 RX ORDER — LEFLUNOMIDE 20 MG/1
20 TABLET ORAL DAILY
Qty: 30 TABLET | Refills: 2 | Status: SHIPPED | OUTPATIENT
Start: 2021-02-25 | End: 2021-04-29 | Stop reason: SDUPTHER

## 2021-02-25 RX ORDER — AZELASTINE 1 MG/ML
SPRAY, METERED NASAL
COMMUNITY
Start: 2021-01-29

## 2021-02-25 RX ORDER — FLUTICASONE PROPIONATE 50 MCG
SPRAY, SUSPENSION (ML) NASAL
COMMUNITY
Start: 2021-01-29

## 2021-02-26 ENCOUNTER — LAB VISIT (OUTPATIENT)
Dept: LAB | Facility: HOSPITAL | Age: 61
End: 2021-02-26
Attending: INTERNAL MEDICINE
Payer: OTHER GOVERNMENT

## 2021-02-26 DIAGNOSIS — M05.79 RHEUMATOID ARTHRITIS INVOLVING MULTIPLE SITES WITH POSITIVE RHEUMATOID FACTOR: ICD-10-CM

## 2021-02-26 DIAGNOSIS — R53.83 FATIGUE, UNSPECIFIED TYPE: ICD-10-CM

## 2021-02-26 DIAGNOSIS — D84.9 IMMUNOSUPPRESSION: ICD-10-CM

## 2021-02-26 LAB
ALBUMIN SERPL BCP-MCNC: 3.9 G/DL (ref 3.5–5.2)
ALP SERPL-CCNC: 107 U/L (ref 55–135)
ALT SERPL W/O P-5'-P-CCNC: 33 U/L (ref 10–44)
ANION GAP SERPL CALC-SCNC: 8 MMOL/L (ref 8–16)
AST SERPL-CCNC: 21 U/L (ref 10–40)
BASOPHILS # BLD AUTO: 0.03 K/UL (ref 0–0.2)
BASOPHILS NFR BLD: 0.6 % (ref 0–1.9)
BILIRUB SERPL-MCNC: 0.3 MG/DL (ref 0.1–1)
BUN SERPL-MCNC: 13 MG/DL (ref 6–20)
CALCIUM SERPL-MCNC: 9 MG/DL (ref 8.7–10.5)
CHLORIDE SERPL-SCNC: 109 MMOL/L (ref 95–110)
CO2 SERPL-SCNC: 28 MMOL/L (ref 23–29)
CREAT SERPL-MCNC: 0.8 MG/DL (ref 0.5–1.4)
CRP SERPL-MCNC: 1.4 MG/L (ref 0–8.2)
DIFFERENTIAL METHOD: ABNORMAL
EOSINOPHIL # BLD AUTO: 0.3 K/UL (ref 0–0.5)
EOSINOPHIL NFR BLD: 6.2 % (ref 0–8)
ERYTHROCYTE [DISTWIDTH] IN BLOOD BY AUTOMATED COUNT: 16.3 % (ref 11.5–14.5)
ERYTHROCYTE [SEDIMENTATION RATE] IN BLOOD BY WESTERGREN METHOD: 35 MM/HR (ref 0–36)
EST. GFR  (AFRICAN AMERICAN): >60 ML/MIN/1.73 M^2
EST. GFR  (NON AFRICAN AMERICAN): >60 ML/MIN/1.73 M^2
GLUCOSE SERPL-MCNC: 99 MG/DL (ref 70–110)
HCT VFR BLD AUTO: 38.5 % (ref 37–48.5)
HGB BLD-MCNC: 11.3 G/DL (ref 12–16)
IMM GRANULOCYTES # BLD AUTO: 0.01 K/UL (ref 0–0.04)
IMM GRANULOCYTES NFR BLD AUTO: 0.2 % (ref 0–0.5)
LYMPHOCYTES # BLD AUTO: 1.8 K/UL (ref 1–4.8)
LYMPHOCYTES NFR BLD: 35.2 % (ref 18–48)
MCH RBC QN AUTO: 22.9 PG (ref 27–31)
MCHC RBC AUTO-ENTMCNC: 29.4 G/DL (ref 32–36)
MCV RBC AUTO: 78 FL (ref 82–98)
MONOCYTES # BLD AUTO: 0.4 K/UL (ref 0.3–1)
MONOCYTES NFR BLD: 8.9 % (ref 4–15)
NEUTROPHILS # BLD AUTO: 2.4 K/UL (ref 1.8–7.7)
NEUTROPHILS NFR BLD: 48.9 % (ref 38–73)
NRBC BLD-RTO: 0 /100 WBC
PLATELET # BLD AUTO: 288 K/UL (ref 150–350)
PMV BLD AUTO: 10.2 FL (ref 9.2–12.9)
POTASSIUM SERPL-SCNC: 4 MMOL/L (ref 3.5–5.1)
PROT SERPL-MCNC: 7.3 G/DL (ref 6–8.4)
RBC # BLD AUTO: 4.93 M/UL (ref 4–5.4)
SODIUM SERPL-SCNC: 145 MMOL/L (ref 136–145)
WBC # BLD AUTO: 4.97 K/UL (ref 3.9–12.7)

## 2021-02-26 PROCEDURE — 85025 COMPLETE CBC W/AUTO DIFF WBC: CPT

## 2021-02-26 PROCEDURE — 85652 RBC SED RATE AUTOMATED: CPT

## 2021-02-26 PROCEDURE — 86706 HEP B SURFACE ANTIBODY: CPT

## 2021-02-26 PROCEDURE — 80053 COMPREHEN METABOLIC PANEL: CPT

## 2021-02-26 PROCEDURE — 36415 COLL VENOUS BLD VENIPUNCTURE: CPT | Mod: PO

## 2021-02-26 PROCEDURE — 86803 HEPATITIS C AB TEST: CPT

## 2021-02-26 PROCEDURE — 87340 HEPATITIS B SURFACE AG IA: CPT

## 2021-02-26 PROCEDURE — 86140 C-REACTIVE PROTEIN: CPT

## 2021-02-26 PROCEDURE — 86704 HEP B CORE ANTIBODY TOTAL: CPT

## 2021-03-01 LAB
HBV CORE AB SERPL QL IA: NEGATIVE
HBV SURFACE AB SER-ACNC: ABNORMAL M[IU]/ML
HBV SURFACE AG SERPL QL IA: NEGATIVE
HCV AB SERPL QL IA: NEGATIVE

## 2021-03-02 ENCOUNTER — PATIENT MESSAGE (OUTPATIENT)
Dept: RHEUMATOLOGY | Facility: CLINIC | Age: 61
End: 2021-03-02

## 2021-03-13 ENCOUNTER — PATIENT MESSAGE (OUTPATIENT)
Dept: PHARMACY | Facility: CLINIC | Age: 61
End: 2021-03-13

## 2021-03-17 ENCOUNTER — SPECIALTY PHARMACY (OUTPATIENT)
Dept: PHARMACY | Facility: CLINIC | Age: 61
End: 2021-03-17

## 2021-04-08 ENCOUNTER — OFFICE VISIT (OUTPATIENT)
Dept: FAMILY MEDICINE | Facility: CLINIC | Age: 61
End: 2021-04-08
Payer: COMMERCIAL

## 2021-04-08 VITALS
HEIGHT: 67 IN | TEMPERATURE: 98 F | HEART RATE: 77 BPM | OXYGEN SATURATION: 97 % | WEIGHT: 215.19 LBS | BODY MASS INDEX: 33.78 KG/M2 | SYSTOLIC BLOOD PRESSURE: 140 MMHG | DIASTOLIC BLOOD PRESSURE: 86 MMHG

## 2021-04-08 DIAGNOSIS — L30.9 DERMATITIS: ICD-10-CM

## 2021-04-08 DIAGNOSIS — E66.3 OVERWEIGHT (BMI 25.0-29.9): ICD-10-CM

## 2021-04-08 DIAGNOSIS — E78.00 PURE HYPERCHOLESTEROLEMIA: Chronic | ICD-10-CM

## 2021-04-08 DIAGNOSIS — I10 ESSENTIAL (PRIMARY) HYPERTENSION: Primary | Chronic | ICD-10-CM

## 2021-04-08 DIAGNOSIS — M05.79 RHEUMATOID ARTHRITIS INVOLVING MULTIPLE SITES WITH POSITIVE RHEUMATOID FACTOR: ICD-10-CM

## 2021-04-08 DIAGNOSIS — Z12.11 SCREENING FOR MALIGNANT NEOPLASM OF COLON: ICD-10-CM

## 2021-04-08 PROCEDURE — 3079F PR MOST RECENT DIASTOLIC BLOOD PRESSURE 80-89 MM HG: ICD-10-PCS | Mod: CPTII,S$GLB,, | Performed by: NURSE PRACTITIONER

## 2021-04-08 PROCEDURE — 99214 OFFICE O/P EST MOD 30 MIN: CPT | Mod: S$GLB,,, | Performed by: NURSE PRACTITIONER

## 2021-04-08 PROCEDURE — 3008F BODY MASS INDEX DOCD: CPT | Mod: CPTII,S$GLB,, | Performed by: NURSE PRACTITIONER

## 2021-04-08 PROCEDURE — 1125F PR PAIN SEVERITY QUANTIFIED, PAIN PRESENT: ICD-10-PCS | Mod: S$GLB,,, | Performed by: NURSE PRACTITIONER

## 2021-04-08 PROCEDURE — 3077F SYST BP >= 140 MM HG: CPT | Mod: CPTII,S$GLB,, | Performed by: NURSE PRACTITIONER

## 2021-04-08 PROCEDURE — 99214 PR OFFICE/OUTPT VISIT, EST, LEVL IV, 30-39 MIN: ICD-10-PCS | Mod: S$GLB,,, | Performed by: NURSE PRACTITIONER

## 2021-04-08 PROCEDURE — 3079F DIAST BP 80-89 MM HG: CPT | Mod: CPTII,S$GLB,, | Performed by: NURSE PRACTITIONER

## 2021-04-08 PROCEDURE — 99999 PR PBB SHADOW E&M-EST. PATIENT-LVL IV: CPT | Mod: PBBFAC,,, | Performed by: NURSE PRACTITIONER

## 2021-04-08 PROCEDURE — 3008F PR BODY MASS INDEX (BMI) DOCUMENTED: ICD-10-PCS | Mod: CPTII,S$GLB,, | Performed by: NURSE PRACTITIONER

## 2021-04-08 PROCEDURE — 3077F PR MOST RECENT SYSTOLIC BLOOD PRESSURE >= 140 MM HG: ICD-10-PCS | Mod: CPTII,S$GLB,, | Performed by: NURSE PRACTITIONER

## 2021-04-08 PROCEDURE — 99999 PR PBB SHADOW E&M-EST. PATIENT-LVL IV: ICD-10-PCS | Mod: PBBFAC,,, | Performed by: NURSE PRACTITIONER

## 2021-04-08 PROCEDURE — 1125F AMNT PAIN NOTED PAIN PRSNT: CPT | Mod: S$GLB,,, | Performed by: NURSE PRACTITIONER

## 2021-04-08 RX ORDER — AMLODIPINE BESYLATE 10 MG/1
10 TABLET ORAL DAILY
Qty: 90 TABLET | Refills: 1 | Status: SHIPPED | OUTPATIENT
Start: 2021-04-08 | End: 2021-05-24

## 2021-04-08 RX ORDER — TRIAMCINOLONE ACETONIDE 5 MG/G
CREAM TOPICAL 2 TIMES DAILY
Qty: 30 G | Refills: 1 | Status: SHIPPED | OUTPATIENT
Start: 2021-04-08

## 2021-04-08 RX ORDER — LOSARTAN POTASSIUM 50 MG/1
50 TABLET ORAL DAILY
Qty: 90 TABLET | Refills: 1 | Status: SHIPPED | OUTPATIENT
Start: 2021-04-08 | End: 2021-05-24

## 2021-04-10 DIAGNOSIS — M05.79 RHEUMATOID ARTHRITIS INVOLVING MULTIPLE SITES WITH POSITIVE RHEUMATOID FACTOR: ICD-10-CM

## 2021-04-13 RX ORDER — ADALIMUMAB 40MG/0.4ML
40 KIT SUBCUTANEOUS
Qty: 2 SYRINGE | Refills: 2 | Status: SHIPPED | OUTPATIENT
Start: 2021-04-13 | End: 2021-07-06 | Stop reason: SDUPTHER

## 2021-04-14 ENCOUNTER — SPECIALTY PHARMACY (OUTPATIENT)
Dept: PHARMACY | Facility: CLINIC | Age: 61
End: 2021-04-14

## 2021-04-20 ENCOUNTER — PATIENT MESSAGE (OUTPATIENT)
Dept: RHEUMATOLOGY | Facility: CLINIC | Age: 61
End: 2021-04-20

## 2021-04-21 ENCOUNTER — PATIENT MESSAGE (OUTPATIENT)
Dept: RHEUMATOLOGY | Facility: CLINIC | Age: 61
End: 2021-04-21

## 2021-04-27 ENCOUNTER — PATIENT OUTREACH (OUTPATIENT)
Dept: ADMINISTRATIVE | Facility: OTHER | Age: 61
End: 2021-04-27

## 2021-04-29 ENCOUNTER — OFFICE VISIT (OUTPATIENT)
Dept: RHEUMATOLOGY | Facility: CLINIC | Age: 61
End: 2021-04-29
Payer: COMMERCIAL

## 2021-04-29 ENCOUNTER — LAB VISIT (OUTPATIENT)
Dept: LAB | Facility: HOSPITAL | Age: 61
End: 2021-04-29
Attending: INTERNAL MEDICINE
Payer: COMMERCIAL

## 2021-04-29 ENCOUNTER — TELEPHONE (OUTPATIENT)
Dept: FAMILY MEDICINE | Facility: CLINIC | Age: 61
End: 2021-04-29

## 2021-04-29 VITALS
DIASTOLIC BLOOD PRESSURE: 87 MMHG | WEIGHT: 219.38 LBS | SYSTOLIC BLOOD PRESSURE: 127 MMHG | BODY MASS INDEX: 34.43 KG/M2 | HEIGHT: 67 IN | HEART RATE: 80 BPM

## 2021-04-29 DIAGNOSIS — R53.83 FATIGUE, UNSPECIFIED TYPE: ICD-10-CM

## 2021-04-29 DIAGNOSIS — D84.9 IMMUNOSUPPRESSION: ICD-10-CM

## 2021-04-29 DIAGNOSIS — M05.79 RHEUMATOID ARTHRITIS INVOLVING MULTIPLE SITES WITH POSITIVE RHEUMATOID FACTOR: Primary | ICD-10-CM

## 2021-04-29 DIAGNOSIS — M05.79 RHEUMATOID ARTHRITIS INVOLVING MULTIPLE SITES WITH POSITIVE RHEUMATOID FACTOR: ICD-10-CM

## 2021-04-29 LAB
ALBUMIN SERPL BCP-MCNC: 4.1 G/DL (ref 3.5–5.2)
ALP SERPL-CCNC: 112 U/L (ref 55–135)
ALT SERPL W/O P-5'-P-CCNC: 16 U/L (ref 10–44)
ANION GAP SERPL CALC-SCNC: 8 MMOL/L (ref 8–16)
AST SERPL-CCNC: 18 U/L (ref 10–40)
BASOPHILS # BLD AUTO: 0.03 K/UL (ref 0–0.2)
BASOPHILS NFR BLD: 0.6 % (ref 0–1.9)
BILIRUB SERPL-MCNC: 0.5 MG/DL (ref 0.1–1)
BUN SERPL-MCNC: 12 MG/DL (ref 6–20)
CALCIUM SERPL-MCNC: 9.7 MG/DL (ref 8.7–10.5)
CHLORIDE SERPL-SCNC: 108 MMOL/L (ref 95–110)
CO2 SERPL-SCNC: 28 MMOL/L (ref 23–29)
CREAT SERPL-MCNC: 0.8 MG/DL (ref 0.5–1.4)
CRP SERPL-MCNC: 1.8 MG/L (ref 0–8.2)
DIFFERENTIAL METHOD: ABNORMAL
EOSINOPHIL # BLD AUTO: 0.2 K/UL (ref 0–0.5)
EOSINOPHIL NFR BLD: 3.1 % (ref 0–8)
ERYTHROCYTE [DISTWIDTH] IN BLOOD BY AUTOMATED COUNT: 16.5 % (ref 11.5–14.5)
ERYTHROCYTE [SEDIMENTATION RATE] IN BLOOD BY WESTERGREN METHOD: 32 MM/HR (ref 0–36)
EST. GFR  (AFRICAN AMERICAN): >60 ML/MIN/1.73 M^2
EST. GFR  (NON AFRICAN AMERICAN): >60 ML/MIN/1.73 M^2
GLUCOSE SERPL-MCNC: 90 MG/DL (ref 70–110)
HCT VFR BLD AUTO: 39.9 % (ref 37–48.5)
HGB BLD-MCNC: 12 G/DL (ref 12–16)
IMM GRANULOCYTES # BLD AUTO: 0.02 K/UL (ref 0–0.04)
IMM GRANULOCYTES NFR BLD AUTO: 0.4 % (ref 0–0.5)
LYMPHOCYTES # BLD AUTO: 1.8 K/UL (ref 1–4.8)
LYMPHOCYTES NFR BLD: 36.3 % (ref 18–48)
MCH RBC QN AUTO: 23.2 PG (ref 27–31)
MCHC RBC AUTO-ENTMCNC: 30.1 G/DL (ref 32–36)
MCV RBC AUTO: 77 FL (ref 82–98)
MONOCYTES # BLD AUTO: 0.4 K/UL (ref 0.3–1)
MONOCYTES NFR BLD: 7.6 % (ref 4–15)
NEUTROPHILS # BLD AUTO: 2.5 K/UL (ref 1.8–7.7)
NEUTROPHILS NFR BLD: 52 % (ref 38–73)
NRBC BLD-RTO: 0 /100 WBC
PLATELET # BLD AUTO: 298 K/UL (ref 150–450)
PMV BLD AUTO: 10 FL (ref 9.2–12.9)
POTASSIUM SERPL-SCNC: 4.3 MMOL/L (ref 3.5–5.1)
PROT SERPL-MCNC: 7.6 G/DL (ref 6–8.4)
RBC # BLD AUTO: 5.18 M/UL (ref 4–5.4)
SODIUM SERPL-SCNC: 144 MMOL/L (ref 136–145)
WBC # BLD AUTO: 4.87 K/UL (ref 3.9–12.7)

## 2021-04-29 PROCEDURE — 85025 COMPLETE CBC W/AUTO DIFF WBC: CPT | Performed by: INTERNAL MEDICINE

## 2021-04-29 PROCEDURE — 80053 COMPREHEN METABOLIC PANEL: CPT | Performed by: INTERNAL MEDICINE

## 2021-04-29 PROCEDURE — 36415 COLL VENOUS BLD VENIPUNCTURE: CPT | Performed by: INTERNAL MEDICINE

## 2021-04-29 PROCEDURE — 3008F BODY MASS INDEX DOCD: CPT | Mod: CPTII,S$GLB,, | Performed by: INTERNAL MEDICINE

## 2021-04-29 PROCEDURE — 99214 OFFICE O/P EST MOD 30 MIN: CPT | Mod: S$GLB,,, | Performed by: INTERNAL MEDICINE

## 2021-04-29 PROCEDURE — 99214 PR OFFICE/OUTPT VISIT, EST, LEVL IV, 30-39 MIN: ICD-10-PCS | Mod: S$GLB,,, | Performed by: INTERNAL MEDICINE

## 2021-04-29 PROCEDURE — 99999 PR PBB SHADOW E&M-EST. PATIENT-LVL III: CPT | Mod: PBBFAC,,, | Performed by: INTERNAL MEDICINE

## 2021-04-29 PROCEDURE — 85652 RBC SED RATE AUTOMATED: CPT | Performed by: INTERNAL MEDICINE

## 2021-04-29 PROCEDURE — 86140 C-REACTIVE PROTEIN: CPT | Performed by: INTERNAL MEDICINE

## 2021-04-29 PROCEDURE — 99999 PR PBB SHADOW E&M-EST. PATIENT-LVL III: ICD-10-PCS | Mod: PBBFAC,,, | Performed by: INTERNAL MEDICINE

## 2021-04-29 PROCEDURE — 3008F PR BODY MASS INDEX (BMI) DOCUMENTED: ICD-10-PCS | Mod: CPTII,S$GLB,, | Performed by: INTERNAL MEDICINE

## 2021-04-29 PROCEDURE — 1125F AMNT PAIN NOTED PAIN PRSNT: CPT | Mod: S$GLB,,, | Performed by: INTERNAL MEDICINE

## 2021-04-29 PROCEDURE — 1125F PR PAIN SEVERITY QUANTIFIED, PAIN PRESENT: ICD-10-PCS | Mod: S$GLB,,, | Performed by: INTERNAL MEDICINE

## 2021-04-29 RX ORDER — LEFLUNOMIDE 20 MG/1
20 TABLET ORAL DAILY
Qty: 30 TABLET | Refills: 2 | Status: SHIPPED | OUTPATIENT
Start: 2021-04-29 | End: 2022-06-30 | Stop reason: SDUPTHER

## 2021-04-30 ENCOUNTER — PATIENT MESSAGE (OUTPATIENT)
Dept: RHEUMATOLOGY | Facility: CLINIC | Age: 61
End: 2021-04-30

## 2021-05-01 ENCOUNTER — PATIENT MESSAGE (OUTPATIENT)
Dept: RHEUMATOLOGY | Facility: CLINIC | Age: 61
End: 2021-05-01

## 2021-05-03 ENCOUNTER — LAB VISIT (OUTPATIENT)
Dept: LAB | Facility: HOSPITAL | Age: 61
End: 2021-05-03
Attending: INTERNAL MEDICINE
Payer: COMMERCIAL

## 2021-05-03 ENCOUNTER — PATIENT MESSAGE (OUTPATIENT)
Dept: RHEUMATOLOGY | Facility: CLINIC | Age: 61
End: 2021-05-03

## 2021-05-03 DIAGNOSIS — D84.9 IMMUNOSUPPRESSION: ICD-10-CM

## 2021-05-03 DIAGNOSIS — Z12.11 SCREENING FOR MALIGNANT NEOPLASM OF COLON: ICD-10-CM

## 2021-05-03 DIAGNOSIS — R53.83 FATIGUE, UNSPECIFIED TYPE: ICD-10-CM

## 2021-05-03 DIAGNOSIS — M05.79 RHEUMATOID ARTHRITIS INVOLVING MULTIPLE SITES WITH POSITIVE RHEUMATOID FACTOR: Primary | ICD-10-CM

## 2021-05-03 PROCEDURE — 82274 ASSAY TEST FOR BLOOD FECAL: CPT | Performed by: NURSE PRACTITIONER

## 2021-05-05 ENCOUNTER — TELEPHONE (OUTPATIENT)
Dept: FAMILY MEDICINE | Facility: CLINIC | Age: 61
End: 2021-05-05

## 2021-05-06 ENCOUNTER — PATIENT MESSAGE (OUTPATIENT)
Dept: FAMILY MEDICINE | Facility: CLINIC | Age: 61
End: 2021-05-06

## 2021-05-07 ENCOUNTER — TELEPHONE (OUTPATIENT)
Dept: FAMILY MEDICINE | Facility: CLINIC | Age: 61
End: 2021-05-07

## 2021-05-13 ENCOUNTER — SPECIALTY PHARMACY (OUTPATIENT)
Dept: PHARMACY | Facility: CLINIC | Age: 61
End: 2021-05-13

## 2021-05-13 LAB — HEMOCCULT STL QL IA: NEGATIVE

## 2021-06-07 ENCOUNTER — PATIENT MESSAGE (OUTPATIENT)
Dept: PHARMACY | Facility: CLINIC | Age: 61
End: 2021-06-07

## 2021-06-07 ENCOUNTER — SPECIALTY PHARMACY (OUTPATIENT)
Dept: PHARMACY | Facility: CLINIC | Age: 61
End: 2021-06-07

## 2021-06-15 DIAGNOSIS — Z12.31 ENCOUNTER FOR SCREENING MAMMOGRAM FOR MALIGNANT NEOPLASM OF BREAST: Primary | ICD-10-CM

## 2021-06-24 ENCOUNTER — TELEPHONE (OUTPATIENT)
Dept: RHEUMATOLOGY | Facility: CLINIC | Age: 61
End: 2021-06-24

## 2021-07-06 DIAGNOSIS — M05.79 RHEUMATOID ARTHRITIS INVOLVING MULTIPLE SITES WITH POSITIVE RHEUMATOID FACTOR: ICD-10-CM

## 2021-07-07 ENCOUNTER — SPECIALTY PHARMACY (OUTPATIENT)
Dept: PHARMACY | Facility: CLINIC | Age: 61
End: 2021-07-07

## 2021-07-07 RX ORDER — ADALIMUMAB 40MG/0.4ML
40 KIT SUBCUTANEOUS
Qty: 2 SYRINGE | Refills: 2 | Status: SHIPPED | OUTPATIENT
Start: 2021-07-07 | End: 2021-09-30 | Stop reason: SDUPTHER

## 2021-07-30 ENCOUNTER — SPECIALTY PHARMACY (OUTPATIENT)
Dept: PHARMACY | Facility: CLINIC | Age: 61
End: 2021-07-30

## 2021-08-28 ENCOUNTER — PATIENT MESSAGE (OUTPATIENT)
Dept: PHARMACY | Facility: CLINIC | Age: 61
End: 2021-08-28

## 2021-09-09 ENCOUNTER — SPECIALTY PHARMACY (OUTPATIENT)
Dept: PHARMACY | Facility: CLINIC | Age: 61
End: 2021-09-09

## 2021-09-30 ENCOUNTER — SPECIALTY PHARMACY (OUTPATIENT)
Dept: PHARMACY | Facility: CLINIC | Age: 61
End: 2021-09-30

## 2021-09-30 DIAGNOSIS — M05.79 RHEUMATOID ARTHRITIS INVOLVING MULTIPLE SITES WITH POSITIVE RHEUMATOID FACTOR: ICD-10-CM

## 2021-10-01 RX ORDER — ADALIMUMAB 40MG/0.4ML
40 KIT SUBCUTANEOUS
Qty: 2 SYRINGE | Refills: 2 | Status: SHIPPED | OUTPATIENT
Start: 2021-10-01 | End: 2021-11-19 | Stop reason: SDUPTHER

## 2021-10-04 ENCOUNTER — PATIENT MESSAGE (OUTPATIENT)
Dept: PHARMACY | Facility: CLINIC | Age: 61
End: 2021-10-04

## 2021-10-07 ENCOUNTER — HOSPITAL ENCOUNTER (OUTPATIENT)
Dept: RADIOLOGY | Facility: HOSPITAL | Age: 61
Discharge: HOME | End: 2021-10-07
Attending: PHYSICIAN ASSISTANT
Payer: OTHER GOVERNMENT

## 2021-10-07 DIAGNOSIS — Z12.31 ENCOUNTER FOR SCREENING MAMMOGRAM FOR MALIGNANT NEOPLASM OF BREAST: ICD-10-CM

## 2021-10-07 PROCEDURE — 77067 MAMMO DIGITAL SCREENING BILAT WITH TOMO: ICD-10-PCS | Mod: 26,,, | Performed by: RADIOLOGY

## 2021-10-07 PROCEDURE — 77063 BREAST TOMOSYNTHESIS BI: CPT | Mod: 26,,, | Performed by: RADIOLOGY

## 2021-10-07 PROCEDURE — 77067 SCR MAMMO BI INCL CAD: CPT | Mod: 26,,, | Performed by: RADIOLOGY

## 2021-10-07 PROCEDURE — 77063 MAMMO DIGITAL SCREENING BILAT WITH TOMO: ICD-10-PCS | Mod: 26,,, | Performed by: RADIOLOGY

## 2021-10-07 PROCEDURE — 77067 SCR MAMMO BI INCL CAD: CPT | Mod: TC,PO

## 2021-10-12 ENCOUNTER — TELEPHONE (OUTPATIENT)
Dept: RHEUMATOLOGY | Facility: CLINIC | Age: 61
End: 2021-10-12

## 2021-10-18 ENCOUNTER — OFFICE VISIT (OUTPATIENT)
Dept: RHEUMATOLOGY | Facility: CLINIC | Age: 61
End: 2021-10-18
Payer: OTHER GOVERNMENT

## 2021-10-18 ENCOUNTER — PATIENT MESSAGE (OUTPATIENT)
Dept: PHARMACY | Facility: CLINIC | Age: 61
End: 2021-10-18
Payer: OTHER GOVERNMENT

## 2021-10-18 DIAGNOSIS — M05.79 RHEUMATOID ARTHRITIS INVOLVING MULTIPLE SITES WITH POSITIVE RHEUMATOID FACTOR: ICD-10-CM

## 2021-10-18 PROCEDURE — 99214 PR OFFICE/OUTPT VISIT, EST, LEVL IV, 30-39 MIN: ICD-10-PCS | Mod: 95,,, | Performed by: INTERNAL MEDICINE

## 2021-10-18 PROCEDURE — 99214 OFFICE O/P EST MOD 30 MIN: CPT | Mod: 95,,, | Performed by: INTERNAL MEDICINE

## 2021-10-18 RX ORDER — CETIRIZINE HYDROCHLORIDE 10 MG/1
TABLET ORAL
COMMUNITY
Start: 2021-07-22

## 2021-10-18 RX ORDER — OMEPRAZOLE 20 MG/1
CAPSULE, DELAYED RELEASE ORAL
COMMUNITY
Start: 2021-09-16

## 2021-10-18 RX ORDER — DICLOFENAC SODIUM 10 MG/G
GEL TOPICAL
COMMUNITY
Start: 2021-09-08

## 2021-10-18 RX ORDER — ESCITALOPRAM OXALATE 20 MG/1
1 TABLET ORAL DAILY
COMMUNITY
Start: 2021-06-09

## 2021-10-18 RX ORDER — TRAZODONE HYDROCHLORIDE 100 MG/1
0.5 TABLET ORAL NIGHTLY PRN
COMMUNITY
Start: 2021-09-15

## 2021-10-18 RX ORDER — FEXOFENADINE HCL 60 MG
TABLET ORAL
COMMUNITY
Start: 2021-07-01

## 2021-10-18 RX ORDER — METHOCARBAMOL 750 MG/1
TABLET, FILM COATED ORAL
COMMUNITY
Start: 2021-06-09

## 2021-10-18 RX ORDER — ATORVASTATIN CALCIUM 80 MG/1
TABLET, FILM COATED ORAL
COMMUNITY
Start: 2021-06-14

## 2021-10-18 RX ORDER — MONTELUKAST SODIUM 10 MG/1
TABLET ORAL
COMMUNITY
Start: 2021-06-09

## 2021-10-19 ENCOUNTER — SPECIALTY PHARMACY (OUTPATIENT)
Dept: PHARMACY | Facility: CLINIC | Age: 61
End: 2021-10-19
Payer: OTHER GOVERNMENT

## 2021-10-19 ENCOUNTER — TELEPHONE (OUTPATIENT)
Dept: RHEUMATOLOGY | Facility: CLINIC | Age: 61
End: 2021-10-19

## 2021-10-19 NOTE — TELEPHONE ENCOUNTER
Attempted to submit urgent Prior Auth (reauth) for Humira via CM on 10/19 (Key: [id number]) .- error message.     Called plan ([phone]) and Rep informed no account information is pulling up. Rep informed that the plan ended 9/31/2021.    No Active coverage per eligibility check. Forwarding to financial assistance for urgent review.

## 2021-10-19 NOTE — TELEPHONE ENCOUNTER
Patient called [facility] regarding Humira. Prior Authorization is needed.     Patient said she has not injected Humira in about 3 weeks. Encouraged use of a calendar or phone alarms to help with adherence, emphasizing importance of adherence. Reviewed to inject as soon as able and to resume with injecting every 14 days based off of that new day. Patient voiced understanding. Med list reviewed. Pt said she is unsure if she takes Protonix or Prilosec, but she reports she does not take both. She stated she uses Xyzal, Zyrtec, or Allegra but not on the same day together. Reviewed CNS depression risks with hydroxyzine, Azelastine, trazodone, Xyzal, an Zyrtec. Patient voiced understanding and stated she is tolerating regimen well with no concerns. She reported no new allergies. Informed patient that PA is needed. She voiced understanding. Patient stated she does not have a new plan. Patient had no further questions or concerns.

## 2021-11-09 ENCOUNTER — PATIENT MESSAGE (OUTPATIENT)
Dept: RHEUMATOLOGY | Facility: CLINIC | Age: 61
End: 2021-11-09
Payer: OTHER GOVERNMENT

## 2021-11-09 ENCOUNTER — PATIENT MESSAGE (OUTPATIENT)
Dept: RHEUMATOLOGY | Facility: CLINIC | Age: 61
End: 2021-11-09
Payer: COMMERCIAL

## 2021-11-10 NOTE — TELEPHONE ENCOUNTER
Call attempts to pt at [place], home, and work number. Pt does not have a voicemail set up to leave message.   Will try another call attempt later today 11/10.  Need Humira FA permission    PTL

## 2021-11-12 ENCOUNTER — PATIENT MESSAGE (OUTPATIENT)
Dept: RHEUMATOLOGY | Facility: CLINIC | Age: 61
End: 2021-11-12
Payer: COMMERCIAL

## 2021-11-18 ENCOUNTER — PATIENT MESSAGE (OUTPATIENT)
Dept: RHEUMATOLOGY | Facility: CLINIC | Age: 61
End: 2021-11-18
Payer: OTHER GOVERNMENT

## 2021-11-18 DIAGNOSIS — M05.79 RHEUMATOID ARTHRITIS INVOLVING MULTIPLE SITES WITH POSITIVE RHEUMATOID FACTOR: ICD-10-CM

## 2021-11-19 DIAGNOSIS — M05.79 RHEUMATOID ARTHRITIS INVOLVING MULTIPLE SITES WITH POSITIVE RHEUMATOID FACTOR: ICD-10-CM

## 2021-11-19 RX ORDER — ADALIMUMAB 40MG/0.4ML
40 KIT SUBCUTANEOUS
Qty: 2 EACH | Refills: 2 | Status: SHIPPED | OUTPATIENT
Start: 2021-11-19 | End: 2021-12-23 | Stop reason: SDUPTHER

## 2021-12-13 ENCOUNTER — TELEPHONE (OUTPATIENT)
Dept: RHEUMATOLOGY | Facility: CLINIC | Age: 61
End: 2021-12-13
Payer: COMMERCIAL

## 2021-12-13 DIAGNOSIS — M05.79 RHEUMATOID ARTHRITIS INVOLVING MULTIPLE SITES WITH POSITIVE RHEUMATOID FACTOR: Primary | ICD-10-CM

## 2021-12-13 DIAGNOSIS — D84.9 IMMUNOSUPPRESSION: ICD-10-CM

## 2021-12-13 DIAGNOSIS — R53.83 FATIGUE, UNSPECIFIED TYPE: ICD-10-CM

## 2021-12-15 ENCOUNTER — PATIENT MESSAGE (OUTPATIENT)
Dept: ADMINISTRATIVE | Facility: HOSPITAL | Age: 61
End: 2021-12-15
Payer: OTHER GOVERNMENT

## 2021-12-23 ENCOUNTER — OFFICE VISIT (OUTPATIENT)
Dept: RHEUMATOLOGY | Facility: CLINIC | Age: 61
End: 2021-12-23
Payer: OTHER GOVERNMENT

## 2021-12-23 VITALS
HEIGHT: 67 IN | DIASTOLIC BLOOD PRESSURE: 91 MMHG | HEART RATE: 75 BPM | BODY MASS INDEX: 33.42 KG/M2 | SYSTOLIC BLOOD PRESSURE: 140 MMHG | WEIGHT: 212.94 LBS

## 2021-12-23 DIAGNOSIS — M05.79 RHEUMATOID ARTHRITIS INVOLVING MULTIPLE SITES WITH POSITIVE RHEUMATOID FACTOR: Primary | ICD-10-CM

## 2021-12-23 DIAGNOSIS — D84.9 IMMUNOSUPPRESSION: ICD-10-CM

## 2021-12-23 DIAGNOSIS — R53.83 FATIGUE, UNSPECIFIED TYPE: ICD-10-CM

## 2021-12-23 PROCEDURE — 99214 PR OFFICE/OUTPT VISIT, EST, LEVL IV, 30-39 MIN: ICD-10-PCS | Mod: S$PBB,,, | Performed by: INTERNAL MEDICINE

## 2021-12-23 PROCEDURE — 99999 PR PBB SHADOW E&M-EST. PATIENT-LVL V: ICD-10-PCS | Mod: PBBFAC,,, | Performed by: INTERNAL MEDICINE

## 2021-12-23 PROCEDURE — 99214 OFFICE O/P EST MOD 30 MIN: CPT | Mod: S$PBB,,, | Performed by: INTERNAL MEDICINE

## 2021-12-23 PROCEDURE — 99999 PR PBB SHADOW E&M-EST. PATIENT-LVL V: CPT | Mod: PBBFAC,,, | Performed by: INTERNAL MEDICINE

## 2021-12-23 RX ORDER — ADALIMUMAB 40MG/0.4ML
40 KIT SUBCUTANEOUS
Qty: 2 EACH | Refills: 2 | Status: SHIPPED | OUTPATIENT
Start: 2021-12-23 | End: 2022-04-20 | Stop reason: SDUPTHER

## 2022-01-26 NOTE — TELEPHONE ENCOUNTER
Outgoing FA call. [facility]. Won't be using [facility] for her refill as she does not have insurance currently. Informing assigned pharmacist.

## 2022-02-23 DIAGNOSIS — D84.9 IMMUNOSUPPRESSED STATUS: ICD-10-CM

## 2022-03-02 ENCOUNTER — PATIENT MESSAGE (OUTPATIENT)
Dept: RHEUMATOLOGY | Facility: CLINIC | Age: 62
End: 2022-03-02
Payer: OTHER GOVERNMENT

## 2022-03-09 ENCOUNTER — PATIENT MESSAGE (OUTPATIENT)
Dept: FAMILY MEDICINE | Facility: CLINIC | Age: 62
End: 2022-03-09
Payer: OTHER GOVERNMENT

## 2022-04-04 ENCOUNTER — PATIENT MESSAGE (OUTPATIENT)
Dept: RHEUMATOLOGY | Facility: CLINIC | Age: 62
End: 2022-04-04
Payer: OTHER GOVERNMENT

## 2022-04-13 ENCOUNTER — LAB VISIT (OUTPATIENT)
Dept: LAB | Facility: HOSPITAL | Age: 62
End: 2022-04-13
Attending: INTERNAL MEDICINE
Payer: OTHER GOVERNMENT

## 2022-04-13 DIAGNOSIS — R53.83 FATIGUE, UNSPECIFIED TYPE: ICD-10-CM

## 2022-04-13 DIAGNOSIS — M05.79 RHEUMATOID ARTHRITIS INVOLVING MULTIPLE SITES WITH POSITIVE RHEUMATOID FACTOR: ICD-10-CM

## 2022-04-13 DIAGNOSIS — D84.9 IMMUNOSUPPRESSION: ICD-10-CM

## 2022-04-13 LAB
ALBUMIN SERPL BCP-MCNC: 4 G/DL (ref 3.5–5.2)
ALP SERPL-CCNC: 131 U/L (ref 55–135)
ALT SERPL W/O P-5'-P-CCNC: 17 U/L (ref 10–44)
ANION GAP SERPL CALC-SCNC: 10 MMOL/L (ref 8–16)
AST SERPL-CCNC: 15 U/L (ref 10–40)
BASOPHILS # BLD AUTO: 0.04 K/UL (ref 0–0.2)
BASOPHILS NFR BLD: 0.8 % (ref 0–1.9)
BILIRUB SERPL-MCNC: 0.3 MG/DL (ref 0.1–1)
BUN SERPL-MCNC: 13 MG/DL (ref 6–20)
CALCIUM SERPL-MCNC: 10.2 MG/DL (ref 8.7–10.5)
CHLORIDE SERPL-SCNC: 105 MMOL/L (ref 95–110)
CO2 SERPL-SCNC: 27 MMOL/L (ref 23–29)
CREAT SERPL-MCNC: 0.7 MG/DL (ref 0.5–1.4)
CRP SERPL-MCNC: 0.7 MG/L (ref 0–8.2)
DIFFERENTIAL METHOD: ABNORMAL
EOSINOPHIL # BLD AUTO: 0.2 K/UL (ref 0–0.5)
EOSINOPHIL NFR BLD: 3.5 % (ref 0–8)
ERYTHROCYTE [DISTWIDTH] IN BLOOD BY AUTOMATED COUNT: 15.9 % (ref 11.5–14.5)
ERYTHROCYTE [SEDIMENTATION RATE] IN BLOOD BY WESTERGREN METHOD: 14 MM/HR (ref 0–36)
EST. GFR  (AFRICAN AMERICAN): >60 ML/MIN/1.73 M^2
EST. GFR  (NON AFRICAN AMERICAN): >60 ML/MIN/1.73 M^2
GLUCOSE SERPL-MCNC: 114 MG/DL (ref 70–110)
HCT VFR BLD AUTO: 39.7 % (ref 37–48.5)
HGB BLD-MCNC: 11.7 G/DL (ref 12–16)
IMM GRANULOCYTES # BLD AUTO: 0.01 K/UL (ref 0–0.04)
IMM GRANULOCYTES NFR BLD AUTO: 0.2 % (ref 0–0.5)
LYMPHOCYTES # BLD AUTO: 1.8 K/UL (ref 1–4.8)
LYMPHOCYTES NFR BLD: 34.6 % (ref 18–48)
MCH RBC QN AUTO: 22.2 PG (ref 27–31)
MCHC RBC AUTO-ENTMCNC: 29.5 G/DL (ref 32–36)
MCV RBC AUTO: 75 FL (ref 82–98)
MONOCYTES # BLD AUTO: 0.3 K/UL (ref 0.3–1)
MONOCYTES NFR BLD: 5.8 % (ref 4–15)
NEUTROPHILS # BLD AUTO: 2.9 K/UL (ref 1.8–7.7)
NEUTROPHILS NFR BLD: 55.1 % (ref 38–73)
NRBC BLD-RTO: 0 /100 WBC
PLATELET # BLD AUTO: 253 K/UL (ref 150–450)
PMV BLD AUTO: 10.5 FL (ref 9.2–12.9)
POTASSIUM SERPL-SCNC: 3.8 MMOL/L (ref 3.5–5.1)
PROT SERPL-MCNC: 7.3 G/DL (ref 6–8.4)
RBC # BLD AUTO: 5.27 M/UL (ref 4–5.4)
SODIUM SERPL-SCNC: 142 MMOL/L (ref 136–145)
WBC # BLD AUTO: 5.18 K/UL (ref 3.9–12.7)

## 2022-04-13 PROCEDURE — 36415 COLL VENOUS BLD VENIPUNCTURE: CPT | Performed by: INTERNAL MEDICINE

## 2022-04-13 PROCEDURE — 85025 COMPLETE CBC W/AUTO DIFF WBC: CPT | Performed by: INTERNAL MEDICINE

## 2022-04-13 PROCEDURE — 86140 C-REACTIVE PROTEIN: CPT | Performed by: INTERNAL MEDICINE

## 2022-04-13 PROCEDURE — 85652 RBC SED RATE AUTOMATED: CPT | Performed by: INTERNAL MEDICINE

## 2022-04-13 PROCEDURE — 80053 COMPREHEN METABOLIC PANEL: CPT | Performed by: INTERNAL MEDICINE

## 2022-04-19 ENCOUNTER — PATIENT MESSAGE (OUTPATIENT)
Dept: RHEUMATOLOGY | Facility: CLINIC | Age: 62
End: 2022-04-19
Payer: OTHER GOVERNMENT

## 2022-04-19 DIAGNOSIS — M05.79 RHEUMATOID ARTHRITIS INVOLVING MULTIPLE SITES WITH POSITIVE RHEUMATOID FACTOR: ICD-10-CM

## 2022-04-20 ENCOUNTER — PATIENT MESSAGE (OUTPATIENT)
Dept: RHEUMATOLOGY | Facility: CLINIC | Age: 62
End: 2022-04-20
Payer: OTHER GOVERNMENT

## 2022-04-20 RX ORDER — ADALIMUMAB 40MG/0.4ML
40 KIT SUBCUTANEOUS
Qty: 2 EACH | Refills: 2 | Status: SHIPPED | OUTPATIENT
Start: 2022-04-20 | End: 2022-04-21 | Stop reason: SDUPTHER

## 2022-04-21 DIAGNOSIS — M05.79 RHEUMATOID ARTHRITIS INVOLVING MULTIPLE SITES WITH POSITIVE RHEUMATOID FACTOR: ICD-10-CM

## 2022-04-21 RX ORDER — ADALIMUMAB 40MG/0.4ML
40 KIT SUBCUTANEOUS
Qty: 2 EACH | Refills: 2 | Status: SHIPPED | OUTPATIENT
Start: 2022-04-21 | End: 2022-06-30 | Stop reason: SDUPTHER

## 2022-04-27 ENCOUNTER — PATIENT MESSAGE (OUTPATIENT)
Dept: RHEUMATOLOGY | Facility: CLINIC | Age: 62
End: 2022-04-27
Payer: OTHER GOVERNMENT

## 2022-05-07 ENCOUNTER — PATIENT MESSAGE (OUTPATIENT)
Dept: RHEUMATOLOGY | Facility: CLINIC | Age: 62
End: 2022-05-07
Payer: OTHER GOVERNMENT

## 2022-05-09 ENCOUNTER — PATIENT MESSAGE (OUTPATIENT)
Dept: RHEUMATOLOGY | Facility: CLINIC | Age: 62
End: 2022-05-09
Payer: OTHER GOVERNMENT

## 2022-06-21 ENCOUNTER — PATIENT MESSAGE (OUTPATIENT)
Dept: FAMILY MEDICINE | Facility: CLINIC | Age: 62
End: 2022-06-21
Payer: OTHER GOVERNMENT

## 2022-06-30 ENCOUNTER — OFFICE VISIT (OUTPATIENT)
Dept: RHEUMATOLOGY | Facility: CLINIC | Age: 62
End: 2022-06-30
Payer: OTHER GOVERNMENT

## 2022-06-30 ENCOUNTER — SPECIALTY PHARMACY (OUTPATIENT)
Dept: PHARMACY | Facility: CLINIC | Age: 62
End: 2022-06-30
Payer: OTHER GOVERNMENT

## 2022-06-30 ENCOUNTER — LAB VISIT (OUTPATIENT)
Dept: LAB | Facility: HOSPITAL | Age: 62
End: 2022-06-30
Attending: INTERNAL MEDICINE
Payer: OTHER GOVERNMENT

## 2022-06-30 ENCOUNTER — PATIENT MESSAGE (OUTPATIENT)
Dept: RHEUMATOLOGY | Facility: CLINIC | Age: 62
End: 2022-06-30

## 2022-06-30 VITALS
BODY MASS INDEX: 33.91 KG/M2 | HEIGHT: 67 IN | HEART RATE: 74 BPM | DIASTOLIC BLOOD PRESSURE: 85 MMHG | SYSTOLIC BLOOD PRESSURE: 128 MMHG | WEIGHT: 216.06 LBS

## 2022-06-30 DIAGNOSIS — D84.9 IMMUNOSUPPRESSION: ICD-10-CM

## 2022-06-30 DIAGNOSIS — R53.83 FATIGUE, UNSPECIFIED TYPE: ICD-10-CM

## 2022-06-30 DIAGNOSIS — M05.79 RHEUMATOID ARTHRITIS INVOLVING MULTIPLE SITES WITH POSITIVE RHEUMATOID FACTOR: Primary | ICD-10-CM

## 2022-06-30 DIAGNOSIS — M05.79 RHEUMATOID ARTHRITIS INVOLVING MULTIPLE SITES WITH POSITIVE RHEUMATOID FACTOR: ICD-10-CM

## 2022-06-30 DIAGNOSIS — M25.552 LEFT HIP PAIN: Primary | ICD-10-CM

## 2022-06-30 LAB
ALBUMIN SERPL BCP-MCNC: 3.9 G/DL (ref 3.5–5.2)
ALP SERPL-CCNC: 106 U/L (ref 55–135)
ALT SERPL W/O P-5'-P-CCNC: 23 U/L (ref 10–44)
ANION GAP SERPL CALC-SCNC: 8 MMOL/L (ref 8–16)
AST SERPL-CCNC: 19 U/L (ref 10–40)
BASOPHILS # BLD AUTO: 0.04 K/UL (ref 0–0.2)
BASOPHILS NFR BLD: 0.9 % (ref 0–1.9)
BILIRUB SERPL-MCNC: 0.4 MG/DL (ref 0.1–1)
BUN SERPL-MCNC: 14 MG/DL (ref 6–20)
CALCIUM SERPL-MCNC: 9.8 MG/DL (ref 8.7–10.5)
CHLORIDE SERPL-SCNC: 107 MMOL/L (ref 95–110)
CO2 SERPL-SCNC: 29 MMOL/L (ref 23–29)
CREAT SERPL-MCNC: 1 MG/DL (ref 0.5–1.4)
CRP SERPL-MCNC: 0.7 MG/L (ref 0–8.2)
DIFFERENTIAL METHOD: ABNORMAL
EOSINOPHIL # BLD AUTO: 0.2 K/UL (ref 0–0.5)
EOSINOPHIL NFR BLD: 4.3 % (ref 0–8)
ERYTHROCYTE [DISTWIDTH] IN BLOOD BY AUTOMATED COUNT: 16.9 % (ref 11.5–14.5)
ERYTHROCYTE [SEDIMENTATION RATE] IN BLOOD BY PHOTOMETRIC METHOD: 24 MM/HR (ref 0–36)
EST. GFR  (AFRICAN AMERICAN): >60 ML/MIN/1.73 M^2
EST. GFR  (NON AFRICAN AMERICAN): >60 ML/MIN/1.73 M^2
GLUCOSE SERPL-MCNC: 106 MG/DL (ref 70–110)
HCT VFR BLD AUTO: 37.4 % (ref 37–48.5)
HGB BLD-MCNC: 11.1 G/DL (ref 12–16)
IMM GRANULOCYTES # BLD AUTO: 0.01 K/UL (ref 0–0.04)
IMM GRANULOCYTES NFR BLD AUTO: 0.2 % (ref 0–0.5)
LYMPHOCYTES # BLD AUTO: 1.8 K/UL (ref 1–4.8)
LYMPHOCYTES NFR BLD: 40.9 % (ref 18–48)
MCH RBC QN AUTO: 22.9 PG (ref 27–31)
MCHC RBC AUTO-ENTMCNC: 29.7 G/DL (ref 32–36)
MCV RBC AUTO: 77 FL (ref 82–98)
MONOCYTES # BLD AUTO: 0.4 K/UL (ref 0.3–1)
MONOCYTES NFR BLD: 7.8 % (ref 4–15)
NEUTROPHILS # BLD AUTO: 2.1 K/UL (ref 1.8–7.7)
NEUTROPHILS NFR BLD: 45.9 % (ref 38–73)
NRBC BLD-RTO: 0 /100 WBC
PLATELET # BLD AUTO: 244 K/UL (ref 150–450)
PMV BLD AUTO: 10 FL (ref 9.2–12.9)
POTASSIUM SERPL-SCNC: 4.1 MMOL/L (ref 3.5–5.1)
PROT SERPL-MCNC: 6.9 G/DL (ref 6–8.4)
RBC # BLD AUTO: 4.84 M/UL (ref 4–5.4)
SODIUM SERPL-SCNC: 144 MMOL/L (ref 136–145)
WBC # BLD AUTO: 4.47 K/UL (ref 3.9–12.7)

## 2022-06-30 PROCEDURE — 80053 COMPREHEN METABOLIC PANEL: CPT | Performed by: INTERNAL MEDICINE

## 2022-06-30 PROCEDURE — 99213 OFFICE O/P EST LOW 20 MIN: CPT | Mod: PBBFAC | Performed by: INTERNAL MEDICINE

## 2022-06-30 PROCEDURE — 36415 COLL VENOUS BLD VENIPUNCTURE: CPT | Performed by: INTERNAL MEDICINE

## 2022-06-30 PROCEDURE — 99214 PR OFFICE/OUTPT VISIT, EST, LEVL IV, 30-39 MIN: ICD-10-PCS | Mod: S$PBB,,, | Performed by: INTERNAL MEDICINE

## 2022-06-30 PROCEDURE — 99214 OFFICE O/P EST MOD 30 MIN: CPT | Mod: S$PBB,,, | Performed by: INTERNAL MEDICINE

## 2022-06-30 PROCEDURE — 99999 PR PBB SHADOW E&M-EST. PATIENT-LVL III: ICD-10-PCS | Mod: PBBFAC,,, | Performed by: INTERNAL MEDICINE

## 2022-06-30 PROCEDURE — 85025 COMPLETE CBC W/AUTO DIFF WBC: CPT | Performed by: INTERNAL MEDICINE

## 2022-06-30 PROCEDURE — 86140 C-REACTIVE PROTEIN: CPT | Performed by: INTERNAL MEDICINE

## 2022-06-30 PROCEDURE — 85652 RBC SED RATE AUTOMATED: CPT | Performed by: INTERNAL MEDICINE

## 2022-06-30 PROCEDURE — 99999 PR PBB SHADOW E&M-EST. PATIENT-LVL III: CPT | Mod: PBBFAC,,, | Performed by: INTERNAL MEDICINE

## 2022-06-30 RX ORDER — ETANERCEPT 50 MG/ML
50 SOLUTION SUBCUTANEOUS WEEKLY
Qty: 12 ML | Refills: 0 | Status: SHIPPED | OUTPATIENT
Start: 2022-06-30 | End: 2022-07-01 | Stop reason: SDUPTHER

## 2022-06-30 RX ORDER — IRBESARTAN 300 MG/1
300 TABLET ORAL
COMMUNITY
Start: 2022-04-20

## 2022-06-30 RX ORDER — CLONIDINE 0.3 MG/24H
PATCH, EXTENDED RELEASE TRANSDERMAL
COMMUNITY
Start: 2022-03-24

## 2022-06-30 RX ORDER — LANOLIN ALCOHOL/MO/W.PET/CERES
CREAM (GRAM) TOPICAL
COMMUNITY
Start: 2022-01-18

## 2022-06-30 RX ORDER — CLINDAMYCIN HYDROCHLORIDE 300 MG/1
300 CAPSULE ORAL
COMMUNITY
Start: 2022-06-24

## 2022-06-30 RX ORDER — ETANERCEPT 50 MG/ML
50 SOLUTION SUBCUTANEOUS WEEKLY
Qty: 12 ML | Refills: 0 | OUTPATIENT
Start: 2022-06-30 | End: 2022-07-01 | Stop reason: SDUPTHER

## 2022-06-30 RX ORDER — LEFLUNOMIDE 20 MG/1
20 TABLET ORAL DAILY
Qty: 90 TABLET | Refills: 0 | Status: SHIPPED | OUTPATIENT
Start: 2022-06-30 | End: 2023-01-17 | Stop reason: SDUPTHER

## 2022-06-30 RX ORDER — ADALIMUMAB 40MG/0.4ML
40 KIT SUBCUTANEOUS
Qty: 6 EACH | Refills: 0 | Status: SHIPPED | OUTPATIENT
Start: 2022-06-30 | End: 2022-06-30

## 2022-06-30 RX ORDER — HYDROCODONE BITARTRATE AND ACETAMINOPHEN 7.5; 325 MG/1; MG/1
TABLET ORAL
COMMUNITY
Start: 2022-06-24

## 2022-06-30 RX ORDER — SERTRALINE HYDROCHLORIDE 100 MG/1
100 TABLET, FILM COATED ORAL
COMMUNITY
Start: 2022-03-07

## 2022-06-30 RX ORDER — SPIRONOLACTONE 25 MG/1
50 TABLET ORAL
COMMUNITY
Start: 2022-06-21

## 2022-06-30 ASSESSMENT — ROUTINE ASSESSMENT OF PATIENT INDEX DATA (RAPID3)
PATIENT GLOBAL ASSESSMENT SCORE: 5
FATIGUE SCORE: 5
MDHAQ FUNCTION SCORE: 1
WHEN YOU AWAKENED IN THE MORNING OVER THE LAST WEEK, PLEASE INDICATE THE AMOUNT OF TIME IT TAKES UNTIL YOU ARE AS LIMBER AS YOU WILL BE FOR THE DAY: 2 HOURS
AM STIFFNESS SCORE: 1, YES
PSYCHOLOGICAL DISTRESS SCORE: 3.3
PAIN SCORE: 6
TOTAL RAPID3 SCORE: 4.78

## 2022-06-30 NOTE — PROGRESS NOTES
Chief Complaint: Rheumatoid Arthritis    HPI:  Kendra Griffin is a 61 y.o. female with HTN, elevated cholesterol, anemia, and GERD sent in consultation by Dr. Armando Knox for RA.  Since July 2019 had multiple joint pain and decrease mobility.  RF and CCP were found to be positive.    She has seen ID and completed vaccinations except for one more vaccination due April 2020.  She is not currently on prednisone since it made it difficult for her to sleep.   She does not like the side effects     Stopped MTX 2 due to stomach upset.  She started Humira 11/2020.    Compliant with Humira and leflunomide now still feels disease improved 60%. She has missed leflunomide for 1 month.  Humira shipments have been delayed.    1 hour morning stiffness.        5/10 ache hands and left hip.  Swelling daily in hands still.    Worse with activity.   Nothing improves pain.               Review of Systems   Constitutional: Positive for unexpected weight change (gaining weight). Negative for fever.   HENT: Negative for trouble swallowing.     Eyes: Negative for redness.   Respiratory: Negative for cough and shortness of breath.    Cardiovascular: Negative for chest pain.   Gastrointestinal: Negative for constipation and diarrhea.   Genitourinary: Negative for dysuria and genital sores.   Musculoskeletal: Positive for arthralgias.   Skin: Negative for rash.   Neurological: Negative for headaches.  Dizziness  Hematological: Does not bruise/bleed easily.         Objective:   Virtual      Physical Exam   Constitutional: She is oriented to person, place, and time and well-developed, well-nourished, and in no distress.   HENT:   Head: Normocephalic and atraumatic.   Eyes: Conjunctivae and EOM are normal.   Neurological: She is alert and oriented to person, place, and time.   Psychiatric: Mood and affect normal.   CV:  RRR no murmur  LUNGS: CTA b/l   ABD:  No tenderness +BS  Musculoskeletal: 28 joint count: 0 swollen and 0 tender   Pain on  internal and external rotation of left hip and palpation of trochanteric bursa.                  LABS    Component      Latest Ref Rng & Units 4/13/2022   WBC      3.90 - 12.70 K/uL 5.18   RBC      4.00 - 5.40 M/uL 5.27   Hemoglobin      12.0 - 16.0 g/dL 11.7 (L)   Hematocrit      37.0 - 48.5 % 39.7   MCV      82 - 98 fL 75 (L)   MCH      27.0 - 31.0 pg 22.2 (L)   MCHC      32.0 - 36.0 g/dL 29.5 (L)   RDW      11.5 - 14.5 % 15.9 (H)   Platelets      150 - 450 K/uL 253   MPV      9.2 - 12.9 fL 10.5   Immature Granulocytes      0.0 - 0.5 % 0.2   Gran # (ANC)      1.8 - 7.7 K/uL 2.9   Immature Grans (Abs)      0.00 - 0.04 K/uL 0.01   Lymph #      1.0 - 4.8 K/uL 1.8   Mono #      0.3 - 1.0 K/uL 0.3   Eos #      0.0 - 0.5 K/uL 0.2   Baso #      0.00 - 0.20 K/uL 0.04   nRBC      0 /100 WBC 0   Gran %      38.0 - 73.0 % 55.1   Lymph %      18.0 - 48.0 % 34.6   Mono %      4.0 - 15.0 % 5.8   Eosinophil %      0.0 - 8.0 % 3.5   Basophil %      0.0 - 1.9 % 0.8   Differential Method       Automated   Sodium      136 - 145 mmol/L 142   Potassium      3.5 - 5.1 mmol/L 3.8   Chloride      95 - 110 mmol/L 105   CO2      23 - 29 mmol/L 27   Glucose      70 - 110 mg/dL 114 (H)   BUN      6 - 20 mg/dL 13   Creatinine      0.5 - 1.4 mg/dL 0.7   Calcium      8.7 - 10.5 mg/dL 10.2   PROTEIN TOTAL      6.0 - 8.4 g/dL 7.3   Albumin      3.5 - 5.2 g/dL 4.0   BILIRUBIN TOTAL      0.1 - 1.0 mg/dL 0.3   Alkaline Phosphatase      55 - 135 U/L 131   AST      10 - 40 U/L 15   ALT      10 - 44 U/L 17   Anion Gap      8 - 16 mmol/L 10   eGFR if African American      >60 mL/min/1.73 m:2 >60.0   eGFR if non African American      >60 mL/min/1.73 m:2 >60.0   Sed Rate      0 - 36 mm/Hr 14   CRP      0.0 - 8.2 mg/L 0.7         [facility]    Left hand: Focal bony remodeling about the distal aspect of the left ulna is nonspecific and may be of little clinical significance, however, early changes of rheumatoid arthritis can appear at this  site and may be correlated for clinically. Otherwise no evidence for erosive arthropathy identified. Relatively mild degenerative changes in both hands most evident about the thumb CMC joints.     Right hand:  No acute fracture or malalignment identified. Minimal degenerative osteophytic spurring about the bilateral DIP joints. Mild bilateral thumb CMC joint arthrosis. Focal bony remodeling about the distal aspect of the left ulna.. No localized soft tissue swelling.  No unintended radiopaque foreign body identified.      Assessment:       1.  Rheumatoid arthritis.  Manifested by joint pain, positive RF/CCP/SKYLA, elevated CRP.  Active but improved 60% when she has Humira and leflunomide.  Inflammatory markers improved.  Suspect hand issues related to [facility].  2.  Hypertension.    3.  Hypercholesterolemia  4.  Anemia  5.  GERD with esophagitis  6.  Chronic back pain   7.  History of murmur.  Evaluated by cardiology and no significant issue found.  8.  Abnormal stress echo. Follow with primary.   9.  Burning and itching with Humira  10. Osteoporosis.  Told she will need teeth pulled inorder to receive osteoporosis medicine.  [facility] and [facility].  11.  Left hip pain.  Pain on internal and external rotation of left hip and palpation of trochanteric bursa.     Plan:       1.  Continue leflunomide 20 mg daily.  Repeat labs.  Patient continues to require disability.   2.  Switch Enbrel q week.   3.  Completed ID for vaccination  4.  [facility]  5.  Calcium 1,000 mg daily and vitamin D 1,000 units daily  6.  Had COVID vaccine booster and flu vaccine; D[facility] with [facility] booster  7.  X-ray hip    RTO in 6 months/prn

## 2022-06-30 NOTE — TELEPHONE ENCOUNTER
Tried to run test claim for Enbrel on [facility] but pt has no insurance on file.  Ran eligibility check on [facility] with no success.  Called pt to verify insurance coverage.  No answer, LVM.

## 2022-07-05 ENCOUNTER — HOSPITAL ENCOUNTER (OUTPATIENT)
Dept: RADIOLOGY | Facility: HOSPITAL | Age: 62
Discharge: HOME | End: 2022-07-05
Attending: INTERNAL MEDICINE
Payer: OTHER GOVERNMENT

## 2022-07-05 ENCOUNTER — PATIENT MESSAGE (OUTPATIENT)
Dept: RHEUMATOLOGY | Facility: CLINIC | Age: 62
End: 2022-07-05
Payer: OTHER GOVERNMENT

## 2022-07-05 DIAGNOSIS — M05.79 RHEUMATOID ARTHRITIS INVOLVING MULTIPLE SITES WITH POSITIVE RHEUMATOID FACTOR: ICD-10-CM

## 2022-07-05 DIAGNOSIS — M25.552 LEFT HIP PAIN: ICD-10-CM

## 2022-07-05 PROCEDURE — 73502 X-RAY EXAM HIP UNI 2-3 VIEWS: CPT | Mod: 26,LT,, | Performed by: RADIOLOGY

## 2022-07-05 PROCEDURE — 73502 XR HIP WITH PELVIS WHEN PERFORMED, 2 OR 3 VIEWS LEFT: ICD-10-PCS | Mod: 26,LT,, | Performed by: RADIOLOGY

## 2022-07-05 PROCEDURE — 73502 X-RAY EXAM HIP UNI 2-3 VIEWS: CPT | Mod: TC,LT

## 2022-08-08 ENCOUNTER — PATIENT MESSAGE (OUTPATIENT)
Dept: RHEUMATOLOGY | Facility: CLINIC | Age: 62
End: 2022-08-08
Payer: OTHER GOVERNMENT

## 2022-08-08 DIAGNOSIS — M05.79 RHEUMATOID ARTHRITIS INVOLVING MULTIPLE SITES WITH POSITIVE RHEUMATOID FACTOR: Primary | ICD-10-CM

## 2022-08-09 ENCOUNTER — PATIENT MESSAGE (OUTPATIENT)
Dept: RHEUMATOLOGY | Facility: CLINIC | Age: 62
End: 2022-08-09
Payer: OTHER GOVERNMENT

## 2022-08-09 RX ORDER — ETANERCEPT 50 MG/ML
50 SOLUTION SUBCUTANEOUS WEEKLY
Qty: 12 ML | Refills: 0 | Status: SHIPPED | OUTPATIENT
Start: 2022-08-09 | End: 2022-10-05

## 2022-08-10 ENCOUNTER — TELEPHONE (OUTPATIENT)
Dept: RHEUMATOLOGY | Facility: CLINIC | Age: 62
End: 2022-08-10
Payer: OTHER GOVERNMENT

## 2022-09-06 ENCOUNTER — PATIENT MESSAGE (OUTPATIENT)
Dept: RHEUMATOLOGY | Facility: CLINIC | Age: 62
End: 2022-09-06
Payer: OTHER GOVERNMENT

## 2022-09-13 ENCOUNTER — PATIENT MESSAGE (OUTPATIENT)
Dept: FAMILY MEDICINE | Facility: CLINIC | Age: 62
End: 2022-09-13
Payer: OTHER GOVERNMENT

## 2022-09-19 ENCOUNTER — PATIENT MESSAGE (OUTPATIENT)
Dept: RHEUMATOLOGY | Facility: CLINIC | Age: 62
End: 2022-09-19
Payer: OTHER GOVERNMENT

## 2022-09-19 DIAGNOSIS — D84.9 IMMUNOSUPPRESSION: ICD-10-CM

## 2022-09-19 DIAGNOSIS — R53.83 FATIGUE, UNSPECIFIED TYPE: ICD-10-CM

## 2022-09-19 DIAGNOSIS — E66.9 OBESITY, UNSPECIFIED CLASSIFICATION, UNSPECIFIED OBESITY TYPE, UNSPECIFIED WHETHER SERIOUS COMORBIDITY PRESENT: ICD-10-CM

## 2022-09-19 DIAGNOSIS — M05.79 RHEUMATOID ARTHRITIS INVOLVING MULTIPLE SITES WITH POSITIVE RHEUMATOID FACTOR: Primary | ICD-10-CM

## 2022-09-19 DIAGNOSIS — I10 HYPERTENSION, UNSPECIFIED TYPE: ICD-10-CM

## 2022-09-27 ENCOUNTER — LAB VISIT (OUTPATIENT)
Dept: LAB | Facility: HOSPITAL | Age: 62
End: 2022-09-27
Attending: INTERNAL MEDICINE
Payer: OTHER GOVERNMENT

## 2022-09-27 DIAGNOSIS — D84.9 IMMUNOSUPPRESSION: ICD-10-CM

## 2022-09-27 DIAGNOSIS — M05.79 RHEUMATOID ARTHRITIS INVOLVING MULTIPLE SITES WITH POSITIVE RHEUMATOID FACTOR: ICD-10-CM

## 2022-09-27 DIAGNOSIS — R53.83 FATIGUE, UNSPECIFIED TYPE: ICD-10-CM

## 2022-09-27 DIAGNOSIS — E66.9 OBESITY, UNSPECIFIED CLASSIFICATION, UNSPECIFIED OBESITY TYPE, UNSPECIFIED WHETHER SERIOUS COMORBIDITY PRESENT: ICD-10-CM

## 2022-09-27 DIAGNOSIS — I10 HYPERTENSION, UNSPECIFIED TYPE: ICD-10-CM

## 2022-09-27 LAB
CHOLEST SERPL-MCNC: 193 MG/DL (ref 120–199)
CHOLEST/HDLC SERPL: 2.8 {RATIO} (ref 2–5)
HDLC SERPL-MCNC: 68 MG/DL (ref 40–75)
HDLC SERPL: 35.2 % (ref 20–50)
LDLC SERPL CALC-MCNC: 106.6 MG/DL (ref 63–159)
NONHDLC SERPL-MCNC: 125 MG/DL
TRIGL SERPL-MCNC: 92 MG/DL (ref 30–150)

## 2022-09-27 PROCEDURE — 80061 LIPID PANEL: CPT | Performed by: INTERNAL MEDICINE

## 2022-09-27 PROCEDURE — 36415 COLL VENOUS BLD VENIPUNCTURE: CPT | Performed by: INTERNAL MEDICINE

## 2022-10-03 ENCOUNTER — PATIENT MESSAGE (OUTPATIENT)
Dept: RHEUMATOLOGY | Facility: CLINIC | Age: 62
End: 2022-10-03
Payer: OTHER GOVERNMENT

## 2022-10-03 DIAGNOSIS — M05.79 RHEUMATOID ARTHRITIS INVOLVING MULTIPLE SITES WITH POSITIVE RHEUMATOID FACTOR: Primary | ICD-10-CM

## 2022-10-05 ENCOUNTER — PATIENT MESSAGE (OUTPATIENT)
Dept: RHEUMATOLOGY | Facility: CLINIC | Age: 62
End: 2022-10-05
Payer: OTHER GOVERNMENT

## 2022-10-05 DIAGNOSIS — M05.79 RHEUMATOID ARTHRITIS INVOLVING MULTIPLE SITES WITH POSITIVE RHEUMATOID FACTOR: Primary | ICD-10-CM

## 2022-10-05 DIAGNOSIS — E66.9 OBESITY, UNSPECIFIED CLASSIFICATION, UNSPECIFIED OBESITY TYPE, UNSPECIFIED WHETHER SERIOUS COMORBIDITY PRESENT: ICD-10-CM

## 2022-10-05 DIAGNOSIS — I10 HYPERTENSION, UNSPECIFIED TYPE: ICD-10-CM

## 2022-10-05 RX ORDER — TOFACITINIB 11 MG/1
11 TABLET, FILM COATED, EXTENDED RELEASE ORAL DAILY
Qty: 30 TABLET | Refills: 2 | Status: SHIPPED | OUTPATIENT
Start: 2022-10-05 | End: 2022-10-10

## 2022-10-05 NOTE — TELEPHONE ENCOUNTER
Spoke to patient and informed her that cholesterol is normal.  She will start Xeljanz and [facility].  Will check standard labs in one month and 2 months and cholesterol in two months.

## 2022-10-10 ENCOUNTER — PATIENT MESSAGE (OUTPATIENT)
Dept: RHEUMATOLOGY | Facility: CLINIC | Age: 62
End: 2022-10-10
Payer: OTHER GOVERNMENT

## 2022-10-12 DIAGNOSIS — Z12.31 OTHER SCREENING MAMMOGRAM: ICD-10-CM

## 2022-10-13 ENCOUNTER — PATIENT MESSAGE (OUTPATIENT)
Dept: RHEUMATOLOGY | Facility: CLINIC | Age: 62
End: 2022-10-13
Payer: OTHER GOVERNMENT

## 2022-10-14 ENCOUNTER — PATIENT MESSAGE (OUTPATIENT)
Dept: RHEUMATOLOGY | Facility: CLINIC | Age: 62
End: 2022-10-14
Payer: OTHER GOVERNMENT

## 2022-10-31 ENCOUNTER — PATIENT MESSAGE (OUTPATIENT)
Dept: RHEUMATOLOGY | Facility: CLINIC | Age: 62
End: 2022-10-31
Payer: OTHER GOVERNMENT

## 2022-12-01 ENCOUNTER — PATIENT OUTREACH (OUTPATIENT)
Dept: ADMINISTRATIVE | Facility: HOSPITAL | Age: 62
End: 2022-12-01
Payer: OTHER GOVERNMENT

## 2022-12-01 RX ORDER — AMOXICILLIN AND CLAVULANATE POTASSIUM 500; 125 MG/1; MG/1
TABLET, FILM COATED ORAL
COMMUNITY
Start: 2022-11-18

## 2022-12-01 RX ORDER — CHLORHEXIDINE GLUCONATE ORAL RINSE 1.2 MG/ML
SOLUTION DENTAL
COMMUNITY
Start: 2022-11-18

## 2022-12-01 RX ORDER — DULOXETIN HYDROCHLORIDE 30 MG/1
30 CAPSULE, DELAYED RELEASE ORAL
COMMUNITY
Start: 2022-11-09

## 2022-12-01 RX ORDER — AMILORIDE HYDROCHLORIDE 5 MG/1
1 TABLET ORAL DAILY
COMMUNITY
Start: 2022-10-24

## 2022-12-01 RX ORDER — IRBESARTAN 300 MG/1
1 TABLET ORAL DAILY
COMMUNITY
Start: 2022-08-08

## 2022-12-01 RX ORDER — CHOLECALCIFEROL (VITAMIN D3) 25 MCG
1 TABLET ORAL DAILY
COMMUNITY
Start: 2021-12-16

## 2022-12-01 RX ORDER — TRAZODONE HYDROCHLORIDE 150 MG/1
1 TABLET ORAL NIGHTLY PRN
COMMUNITY
Start: 2022-08-08

## 2022-12-01 RX ORDER — METHYLPREDNISOLONE 4 MG/1
TABLET ORAL
COMMUNITY
Start: 2022-11-18

## 2022-12-01 RX ORDER — CLONIDINE 0.3 MG/24H
PATCH, EXTENDED RELEASE TRANSDERMAL
COMMUNITY
Start: 2022-03-24

## 2022-12-01 RX ORDER — SERTRALINE HYDROCHLORIDE 50 MG/1
50 TABLET, FILM COATED ORAL
COMMUNITY
Start: 2022-11-09

## 2022-12-01 RX ORDER — HYDROCODONE BITARTRATE AND ACETAMINOPHEN 5; 325 MG/1; MG/1
1 TABLET ORAL EVERY 6 HOURS PRN
COMMUNITY
Start: 2022-11-18

## 2022-12-01 RX ORDER — IBUPROFEN 600 MG/1
600 TABLET ORAL
COMMUNITY
Start: 2022-11-18

## 2022-12-01 RX ORDER — ZINC GLUCONATE 50 MG
1 TABLET ORAL DAILY
COMMUNITY
Start: 2021-12-16

## 2022-12-02 ENCOUNTER — PATIENT MESSAGE (OUTPATIENT)
Dept: RHEUMATOLOGY | Facility: CLINIC | Age: 62
End: 2022-12-02
Payer: OTHER GOVERNMENT

## 2022-12-02 ENCOUNTER — LAB VISIT (OUTPATIENT)
Dept: LAB | Facility: HOSPITAL | Age: 62
End: 2022-12-02
Attending: INTERNAL MEDICINE
Payer: OTHER GOVERNMENT

## 2022-12-02 DIAGNOSIS — M05.79 RHEUMATOID ARTHRITIS INVOLVING MULTIPLE SITES WITH POSITIVE RHEUMATOID FACTOR: ICD-10-CM

## 2022-12-02 DIAGNOSIS — D84.9 IMMUNOSUPPRESSION: ICD-10-CM

## 2022-12-02 DIAGNOSIS — I10 HYPERTENSION, UNSPECIFIED TYPE: ICD-10-CM

## 2022-12-02 DIAGNOSIS — R53.83 FATIGUE, UNSPECIFIED TYPE: ICD-10-CM

## 2022-12-02 DIAGNOSIS — E66.9 OBESITY, UNSPECIFIED CLASSIFICATION, UNSPECIFIED OBESITY TYPE, UNSPECIFIED WHETHER SERIOUS COMORBIDITY PRESENT: ICD-10-CM

## 2022-12-02 LAB
ALBUMIN SERPL BCP-MCNC: 3.8 G/DL (ref 3.5–5.2)
ALP SERPL-CCNC: 105 U/L (ref 55–135)
ALT SERPL W/O P-5'-P-CCNC: 22 U/L (ref 10–44)
ANION GAP SERPL CALC-SCNC: 6 MMOL/L (ref 8–16)
AST SERPL-CCNC: 22 U/L (ref 10–40)
BASOPHILS # BLD AUTO: 0.04 K/UL (ref 0–0.2)
BASOPHILS NFR BLD: 0.6 % (ref 0–1.9)
BILIRUB SERPL-MCNC: 0.4 MG/DL (ref 0.1–1)
BUN SERPL-MCNC: 12 MG/DL (ref 8–23)
CALCIUM SERPL-MCNC: 9.9 MG/DL (ref 8.7–10.5)
CHLORIDE SERPL-SCNC: 104 MMOL/L (ref 95–110)
CHOLEST SERPL-MCNC: 192 MG/DL (ref 120–199)
CHOLEST/HDLC SERPL: 2.7 {RATIO} (ref 2–5)
CO2 SERPL-SCNC: 26 MMOL/L (ref 23–29)
CREAT SERPL-MCNC: 0.8 MG/DL (ref 0.5–1.4)
CRP SERPL-MCNC: 2.4 MG/L (ref 0–8.2)
DIFFERENTIAL METHOD: ABNORMAL
EOSINOPHIL # BLD AUTO: 0.1 K/UL (ref 0–0.5)
EOSINOPHIL NFR BLD: 2.1 % (ref 0–8)
ERYTHROCYTE [DISTWIDTH] IN BLOOD BY AUTOMATED COUNT: 15.7 % (ref 11.5–14.5)
ERYTHROCYTE [SEDIMENTATION RATE] IN BLOOD BY PHOTOMETRIC METHOD: 22 MM/HR (ref 0–36)
EST. GFR  (NO RACE VARIABLE): >60 ML/MIN/1.73 M^2
GLUCOSE SERPL-MCNC: 88 MG/DL (ref 70–110)
HCT VFR BLD AUTO: 36.6 % (ref 37–48.5)
HDLC SERPL-MCNC: 71 MG/DL (ref 40–75)
HDLC SERPL: 37 % (ref 20–50)
HGB BLD-MCNC: 10.9 G/DL (ref 12–16)
IMM GRANULOCYTES # BLD AUTO: 0.02 K/UL (ref 0–0.04)
IMM GRANULOCYTES NFR BLD AUTO: 0.3 % (ref 0–0.5)
LDLC SERPL CALC-MCNC: 93 MG/DL (ref 63–159)
LYMPHOCYTES # BLD AUTO: 1.5 K/UL (ref 1–4.8)
LYMPHOCYTES NFR BLD: 24.1 % (ref 18–48)
MCH RBC QN AUTO: 23.2 PG (ref 27–31)
MCHC RBC AUTO-ENTMCNC: 29.8 G/DL (ref 32–36)
MCV RBC AUTO: 78 FL (ref 82–98)
MONOCYTES # BLD AUTO: 0.5 K/UL (ref 0.3–1)
MONOCYTES NFR BLD: 8 % (ref 4–15)
NEUTROPHILS # BLD AUTO: 4.1 K/UL (ref 1.8–7.7)
NEUTROPHILS NFR BLD: 64.9 % (ref 38–73)
NONHDLC SERPL-MCNC: 121 MG/DL
NRBC BLD-RTO: 0 /100 WBC
PLATELET # BLD AUTO: 226 K/UL (ref 150–450)
PMV BLD AUTO: 10.1 FL (ref 9.2–12.9)
POTASSIUM SERPL-SCNC: 4.2 MMOL/L (ref 3.5–5.1)
PROT SERPL-MCNC: 7 G/DL (ref 6–8.4)
RBC # BLD AUTO: 4.7 M/UL (ref 4–5.4)
SODIUM SERPL-SCNC: 136 MMOL/L (ref 136–145)
TRIGL SERPL-MCNC: 140 MG/DL (ref 30–150)
WBC # BLD AUTO: 6.27 K/UL (ref 3.9–12.7)

## 2022-12-02 PROCEDURE — 80061 LIPID PANEL: CPT | Performed by: INTERNAL MEDICINE

## 2022-12-02 PROCEDURE — 80053 COMPREHEN METABOLIC PANEL: CPT | Performed by: INTERNAL MEDICINE

## 2022-12-02 PROCEDURE — 86140 C-REACTIVE PROTEIN: CPT | Performed by: INTERNAL MEDICINE

## 2022-12-02 PROCEDURE — 85652 RBC SED RATE AUTOMATED: CPT | Performed by: INTERNAL MEDICINE

## 2022-12-02 PROCEDURE — 36415 COLL VENOUS BLD VENIPUNCTURE: CPT | Performed by: INTERNAL MEDICINE

## 2022-12-02 PROCEDURE — 85025 COMPLETE CBC W/AUTO DIFF WBC: CPT | Performed by: INTERNAL MEDICINE

## 2022-12-12 ENCOUNTER — OFFICE VISIT (OUTPATIENT)
Dept: RHEUMATOLOGY | Facility: CLINIC | Age: 62
End: 2022-12-12
Payer: OTHER GOVERNMENT

## 2022-12-12 VITALS
DIASTOLIC BLOOD PRESSURE: 69 MMHG | OXYGEN SATURATION: 98 % | WEIGHT: 216.06 LBS | BODY MASS INDEX: 33.91 KG/M2 | HEART RATE: 72 BPM | HEIGHT: 67 IN | SYSTOLIC BLOOD PRESSURE: 129 MMHG

## 2022-12-12 DIAGNOSIS — M05.79 RHEUMATOID ARTHRITIS INVOLVING MULTIPLE SITES WITH POSITIVE RHEUMATOID FACTOR: ICD-10-CM

## 2022-12-12 PROCEDURE — 99999 PR PBB SHADOW E&M-EST. PATIENT-LVL V: CPT | Mod: PBBFAC,,, | Performed by: INTERNAL MEDICINE

## 2022-12-12 PROCEDURE — 99215 OFFICE O/P EST HI 40 MIN: CPT | Mod: PBBFAC | Performed by: INTERNAL MEDICINE

## 2022-12-12 PROCEDURE — 99214 OFFICE O/P EST MOD 30 MIN: CPT | Mod: S$PBB,,, | Performed by: INTERNAL MEDICINE

## 2022-12-12 PROCEDURE — 99999 PR PBB SHADOW E&M-EST. PATIENT-LVL V: ICD-10-PCS | Mod: PBBFAC,,, | Performed by: INTERNAL MEDICINE

## 2022-12-12 PROCEDURE — 99214 PR OFFICE/OUTPT VISIT, EST, LEVL IV, 30-39 MIN: ICD-10-PCS | Mod: S$PBB,,, | Performed by: INTERNAL MEDICINE

## 2022-12-12 NOTE — PROGRESS NOTES
Chief Complaint: Rheumatoid Arthritis    HPI:  Kendra Griffin is a 61 y.o. female with HTN, elevated cholesterol, anemia, and GERD sent in consultation by Dr. Armando Knox for RA.  Since July 2019 had multiple joint pain and decrease mobility.  RF and CCP were found to be positive.    She has seen ID and completed vaccinations except for one more vaccination due April 2020.  She is not currently on prednisone since it made it difficult for her to sleep.   She does not like the side effects     Stopped MTX 2 due to stomach upset.    Failed Humira.  Enbrel tried for one month but [facility].   Xeljanz given after Enbrel.  She received one month but never received refills.      Compliant with leflunomide.  1 hour morning stiffness.    5/10 ache hands and left hip.  Swelling daily in hands still.    Worse with activity.   Nothing improves pain.               Review of Systems   Constitutional: Positive for unexpected weight change (gaining weight). Negative for fever.   HENT: Negative for trouble swallowing.     Eyes: Negative for redness.   Respiratory: Negative for cough and shortness of breath.    Cardiovascular: Negative for chest pain.   Gastrointestinal: Negative for constipation and diarrhea.   Genitourinary: Negative for dysuria and genital sores.   Musculoskeletal: Positive for arthralgias.   Skin: Negative for rash.   Neurological: Negative for headaches.  Dizziness  Hematological: Does not bruise/bleed easily.         Objective:        Physical Exam   Constitutional: She is oriented to person, place, and time and well-developed, well-nourished, and in no distress.   HENT:   Head: Normocephalic and atraumatic.   Eyes: Conjunctivae and EOM are normal.   Neurological: She is alert and oriented to person, place, and time.   Psychiatric: Mood and affect normal.   CV:  RRR no murmur  LUNGS: CTA b/l   ABD:  No tenderness +BS  Musculoskeletal: 28 joint count: 0 swollen and 22 tender  (shoulders, all MCPs, all PIPs)   Pain on compression of MTPs           LABS    Component      Latest Ref Rng & Units 12/2/2022   WBC      3.90 - 12.70 K/uL 6.27   RBC      4.00 - 5.40 M/uL 4.70   HEMOGLOBIN      12.0 - 16.0 g/dL 10.9 (L)   HEMATOCRIT      37.0 - 48.5 % 36.6 (L)   MCV      82 - 98 fL 78 (L)   MCH      27.0 - 31.0 pg 23.2 (L)   MCHC      32.0 - 36.0 g/dL 29.8 (L)   RDW      11.5 - 14.5 % 15.7 (H)   Platelets      150 - 450 K/uL 226   MPV      9.2 - 12.9 fL 10.1   Immature Granulocytes      0.0 - 0.5 % 0.3   Gran # (ANC)      1.8 - 7.7 K/uL 4.1   Immature Grans (Abs)      0.00 - 0.04 K/uL 0.02   Lymph #      1.0 - 4.8 K/uL 1.5   Mono #      0.3 - 1.0 K/uL 0.5   Eos #      0.0 - 0.5 K/uL 0.1   Baso #      0.00 - 0.20 K/uL 0.04   nRBC      0 /100 WBC 0   Gran %      38.0 - 73.0 % 64.9   Lymph %      18.0 - 48.0 % 24.1   Mono %      4.0 - 15.0 % 8.0   Eosinophil %      0.0 - 8.0 % 2.1   Basophil %      0.0 - 1.9 % 0.6   Differential Method       Automated   Sodium      136 - 145 mmol/L 136   Potassium      3.5 - 5.1 mmol/L 4.2   Chloride      95 - 110 mmol/L 104   CO2      23 - 29 mmol/L 26   Glucose      70 - 110 mg/dL 88   BUN      8 - 23 mg/dL 12   Creatinine      0.5 - 1.4 mg/dL 0.8   Calcium      8.7 - 10.5 mg/dL 9.9   PROTEIN TOTAL      6.0 - 8.4 g/dL 7.0   Albumin      3.5 - 5.2 g/dL 3.8   BILIRUBIN TOTAL      0.1 - 1.0 mg/dL 0.4   Alkaline Phosphatase      55 - 135 U/L 105   AST      10 - 40 U/L 22   ALT      10 - 44 U/L 22   ANION GAP      8 - 16 mmol/L 6 (L)   eGFR      >60 mL/min/1.73 m:2 >60.0   Cholesterol      120 - 199 mg/dL 192   Triglycerides      30 - 150 mg/dL 140   HDL      40 - 75 mg/dL 71   LDL Cholesterol External      63.0 - 159.0 mg/dL 93.0   HDL/Cholesterol Ratio      20.0 - 50.0 % 37.0   Total Cholesterol/HDL Ratio      2.0 - 5.0 2.7   Non-HDL Cholesterol      mg/dL 121   Sed Rate      0 - 36 mm/Hr 22   CRP      0.0 - 8.2 mg/L 2.4         [facility]    Left hand:  Focal bony remodeling about the distal aspect of the left ulna is nonspecific and may be of little clinical significance, however, early changes of rheumatoid arthritis can appear at this site and may be correlated for clinically. Otherwise no evidence for erosive arthropathy identified. Relatively mild degenerative changes in both hands most evident about the thumb CMC joints.     Right hand:  No acute fracture or malalignment identified. Minimal degenerative osteophytic spurring about the bilateral DIP joints. Mild bilateral thumb CMC joint arthrosis. Focal bony remodeling about the distal aspect of the left ulna.. No localized soft tissue swelling.  No unintended radiopaque foreign body identified.      Assessment:       1.  Rheumatoid arthritis.  Manifested by joint pain, positive RF/CCP/SKYLA, elevated CRP.  Active on Xeljanz (out for 3 weeks) and leflunomide.  Inflammatory markers improved.  Suspect hand issues related to [facility].  2.  Hypertension.    3.  Hypercholesterolemia  4.  Anemia  5.  GERD with esophagitis  6.  Chronic back pain   7.  History of murmur.  Evaluated by cardiology and no significant issue found.  8.  Abnormal stress echo. Follow with primary.   9.  Burning and itching with Humira  10. Osteoporosis.  Told she will need teeth pulled inorder to receive osteoporosis medicine.  [facility] and [facility].  11.  Left hip pain.  Pain on internal and external rotation of left hip and palpation of trochanteric bursa.     Plan:       1.  Continue leflunomide 20 mg daily.  Repeat labs.  Patient continues to require disability.   2.  [facility] to get help with getting Xeljanz.   3.  Completed ID for vaccination  4.  [facility]  5.  Calcium 1,000 mg daily and vitamin D 1,000 units daily  6.  Had COVID vaccine booster and flu vaccine;      RTO in 6 months/prn

## 2023-01-11 ENCOUNTER — PATIENT MESSAGE (OUTPATIENT)
Dept: RHEUMATOLOGY | Facility: CLINIC | Age: 63
End: 2023-01-11
Payer: OTHER GOVERNMENT

## 2023-01-11 DIAGNOSIS — D84.9 IMMUNOSUPPRESSION: ICD-10-CM

## 2023-01-11 DIAGNOSIS — M05.79 RHEUMATOID ARTHRITIS INVOLVING MULTIPLE SITES WITH POSITIVE RHEUMATOID FACTOR: ICD-10-CM

## 2023-01-11 DIAGNOSIS — R53.83 FATIGUE, UNSPECIFIED TYPE: ICD-10-CM

## 2023-01-12 ENCOUNTER — PATIENT MESSAGE (OUTPATIENT)
Dept: RHEUMATOLOGY | Facility: CLINIC | Age: 63
End: 2023-01-12
Payer: OTHER GOVERNMENT

## 2023-01-17 RX ORDER — LEFLUNOMIDE 20 MG/1
20 TABLET ORAL DAILY
Qty: 90 TABLET | Refills: 0 | Status: SHIPPED | OUTPATIENT
Start: 2023-01-17 | End: 2023-05-16 | Stop reason: SDUPTHER

## 2023-01-18 ENCOUNTER — PATIENT MESSAGE (OUTPATIENT)
Dept: RHEUMATOLOGY | Facility: CLINIC | Age: 63
End: 2023-01-18
Payer: OTHER GOVERNMENT

## 2023-01-20 ENCOUNTER — HOSPITAL ENCOUNTER (OUTPATIENT)
Dept: RADIOLOGY | Facility: HOSPITAL | Age: 63
Discharge: HOME | End: 2023-01-20
Attending: INTERNAL MEDICINE
Payer: OTHER GOVERNMENT

## 2023-01-20 DIAGNOSIS — Z12.31 OTHER SCREENING MAMMOGRAM: ICD-10-CM

## 2023-01-20 PROCEDURE — 77067 SCR MAMMO BI INCL CAD: CPT | Mod: 26,,, | Performed by: RADIOLOGY

## 2023-01-20 PROCEDURE — 77067 SCR MAMMO BI INCL CAD: CPT | Mod: TC,PO

## 2023-01-20 PROCEDURE — 77067 MAMMO DIGITAL SCREENING BILAT WITH TOMO: ICD-10-PCS | Mod: 26,,, | Performed by: RADIOLOGY

## 2023-01-20 PROCEDURE — 77063 MAMMO DIGITAL SCREENING BILAT WITH TOMO: ICD-10-PCS | Mod: 26,,, | Performed by: RADIOLOGY

## 2023-01-20 PROCEDURE — 77063 BREAST TOMOSYNTHESIS BI: CPT | Mod: 26,,, | Performed by: RADIOLOGY

## 2023-03-09 ENCOUNTER — PATIENT MESSAGE (OUTPATIENT)
Dept: RHEUMATOLOGY | Facility: CLINIC | Age: 63
End: 2023-03-09
Payer: OTHER GOVERNMENT

## 2023-03-09 DIAGNOSIS — D84.9 IMMUNOSUPPRESSION: ICD-10-CM

## 2023-03-09 DIAGNOSIS — M05.79 RHEUMATOID ARTHRITIS INVOLVING MULTIPLE SITES WITH POSITIVE RHEUMATOID FACTOR: Primary | ICD-10-CM

## 2023-03-09 DIAGNOSIS — M05.79 RHEUMATOID ARTHRITIS INVOLVING MULTIPLE SITES WITH POSITIVE RHEUMATOID FACTOR: ICD-10-CM

## 2023-03-09 DIAGNOSIS — R53.83 FATIGUE, UNSPECIFIED TYPE: ICD-10-CM

## 2023-03-10 ENCOUNTER — LAB VISIT (OUTPATIENT)
Dept: LAB | Facility: HOSPITAL | Age: 63
End: 2023-03-10
Attending: INTERNAL MEDICINE
Payer: OTHER GOVERNMENT

## 2023-03-10 ENCOUNTER — PATIENT MESSAGE (OUTPATIENT)
Dept: RHEUMATOLOGY | Facility: CLINIC | Age: 63
End: 2023-03-10
Payer: OTHER GOVERNMENT

## 2023-03-10 DIAGNOSIS — D84.9 IMMUNOSUPPRESSION: ICD-10-CM

## 2023-03-10 DIAGNOSIS — M05.79 RHEUMATOID ARTHRITIS INVOLVING MULTIPLE SITES WITH POSITIVE RHEUMATOID FACTOR: ICD-10-CM

## 2023-03-10 DIAGNOSIS — R53.83 FATIGUE, UNSPECIFIED TYPE: ICD-10-CM

## 2023-03-10 LAB
ALBUMIN SERPL BCP-MCNC: 4.1 G/DL (ref 3.5–5.2)
ALP SERPL-CCNC: 106 U/L (ref 55–135)
ALT SERPL W/O P-5'-P-CCNC: 16 U/L (ref 10–44)
ANION GAP SERPL CALC-SCNC: 8 MMOL/L (ref 8–16)
AST SERPL-CCNC: 18 U/L (ref 10–40)
BASOPHILS # BLD AUTO: 0.03 K/UL (ref 0–0.2)
BASOPHILS NFR BLD: 0.5 % (ref 0–1.9)
BILIRUB SERPL-MCNC: 0.5 MG/DL (ref 0.1–1)
BUN SERPL-MCNC: 13 MG/DL (ref 8–23)
CALCIUM SERPL-MCNC: 9.9 MG/DL (ref 8.7–10.5)
CHLORIDE SERPL-SCNC: 108 MMOL/L (ref 95–110)
CO2 SERPL-SCNC: 25 MMOL/L (ref 23–29)
CREAT SERPL-MCNC: 0.9 MG/DL (ref 0.5–1.4)
CRP SERPL-MCNC: 2.4 MG/L (ref 0–8.2)
DIFFERENTIAL METHOD: ABNORMAL
EOSINOPHIL # BLD AUTO: 0.1 K/UL (ref 0–0.5)
EOSINOPHIL NFR BLD: 2.3 % (ref 0–8)
ERYTHROCYTE [DISTWIDTH] IN BLOOD BY AUTOMATED COUNT: 16.5 % (ref 11.5–14.5)
ERYTHROCYTE [SEDIMENTATION RATE] IN BLOOD BY PHOTOMETRIC METHOD: 33 MM/HR (ref 0–36)
EST. GFR  (NO RACE VARIABLE): >60 ML/MIN/1.73 M^2
GLUCOSE SERPL-MCNC: 84 MG/DL (ref 70–110)
HBV CORE AB SERPL QL IA: NORMAL
HBV SURFACE AB SER-ACNC: 5.7 MIU/ML
HBV SURFACE AB SER-ACNC: NORMAL M[IU]/ML
HBV SURFACE AG SERPL QL IA: NORMAL
HCT VFR BLD AUTO: 36 % (ref 37–48.5)
HCV AB SERPL QL IA: NORMAL
HGB BLD-MCNC: 10.8 G/DL (ref 12–16)
IMM GRANULOCYTES # BLD AUTO: 0.01 K/UL (ref 0–0.04)
IMM GRANULOCYTES NFR BLD AUTO: 0.2 % (ref 0–0.5)
LYMPHOCYTES # BLD AUTO: 1.9 K/UL (ref 1–4.8)
LYMPHOCYTES NFR BLD: 32.7 % (ref 18–48)
MCH RBC QN AUTO: 23 PG (ref 27–31)
MCHC RBC AUTO-ENTMCNC: 30 G/DL (ref 32–36)
MCV RBC AUTO: 77 FL (ref 82–98)
MONOCYTES # BLD AUTO: 0.4 K/UL (ref 0.3–1)
MONOCYTES NFR BLD: 6.5 % (ref 4–15)
NEUTROPHILS # BLD AUTO: 3.3 K/UL (ref 1.8–7.7)
NEUTROPHILS NFR BLD: 57.8 % (ref 38–73)
NRBC BLD-RTO: 0 /100 WBC
PLATELET # BLD AUTO: 282 K/UL (ref 150–450)
PMV BLD AUTO: 10.1 FL (ref 9.2–12.9)
POTASSIUM SERPL-SCNC: 4.1 MMOL/L (ref 3.5–5.1)
PROT SERPL-MCNC: 7.2 G/DL (ref 6–8.4)
RBC # BLD AUTO: 4.7 M/UL (ref 4–5.4)
SODIUM SERPL-SCNC: 141 MMOL/L (ref 136–145)
WBC # BLD AUTO: 5.69 K/UL (ref 3.9–12.7)

## 2023-03-10 PROCEDURE — 85652 RBC SED RATE AUTOMATED: CPT | Performed by: INTERNAL MEDICINE

## 2023-03-10 PROCEDURE — 86803 HEPATITIS C AB TEST: CPT | Performed by: INTERNAL MEDICINE

## 2023-03-10 PROCEDURE — 80053 COMPREHEN METABOLIC PANEL: CPT | Performed by: INTERNAL MEDICINE

## 2023-03-10 PROCEDURE — 87340 HEPATITIS B SURFACE AG IA: CPT | Performed by: INTERNAL MEDICINE

## 2023-03-10 PROCEDURE — 86706 HEP B SURFACE ANTIBODY: CPT | Mod: 91 | Performed by: INTERNAL MEDICINE

## 2023-03-10 PROCEDURE — 36415 COLL VENOUS BLD VENIPUNCTURE: CPT | Performed by: INTERNAL MEDICINE

## 2023-03-10 PROCEDURE — 85025 COMPLETE CBC W/AUTO DIFF WBC: CPT | Performed by: INTERNAL MEDICINE

## 2023-03-10 PROCEDURE — 86704 HEP B CORE ANTIBODY TOTAL: CPT | Performed by: INTERNAL MEDICINE

## 2023-03-10 PROCEDURE — 86140 C-REACTIVE PROTEIN: CPT | Performed by: INTERNAL MEDICINE

## 2023-03-13 ENCOUNTER — PATIENT MESSAGE (OUTPATIENT)
Dept: RHEUMATOLOGY | Facility: CLINIC | Age: 63
End: 2023-03-13
Payer: OTHER GOVERNMENT

## 2023-03-13 DIAGNOSIS — R53.83 FATIGUE, UNSPECIFIED TYPE: ICD-10-CM

## 2023-03-13 DIAGNOSIS — D84.9 IMMUNOSUPPRESSION: ICD-10-CM

## 2023-03-13 DIAGNOSIS — M05.79 RHEUMATOID ARTHRITIS INVOLVING MULTIPLE SITES WITH POSITIVE RHEUMATOID FACTOR: ICD-10-CM

## 2023-03-14 RX ORDER — KETOTIFEN FUMARATE 0.35 MG/ML
SOLUTION/ DROPS OPHTHALMIC
COMMUNITY
Start: 2023-02-02

## 2023-03-14 NOTE — TELEPHONE ENCOUNTER
The prescription has been printed as the patient requested previously staff to confirm is this what she wants.

## 2023-03-31 ENCOUNTER — PATIENT MESSAGE (OUTPATIENT)
Dept: RHEUMATOLOGY | Facility: CLINIC | Age: 63
End: 2023-03-31
Payer: OTHER GOVERNMENT

## 2023-04-03 ENCOUNTER — PATIENT MESSAGE (OUTPATIENT)
Dept: RHEUMATOLOGY | Facility: CLINIC | Age: 63
End: 2023-04-03
Payer: OTHER GOVERNMENT

## 2023-04-03 ENCOUNTER — TELEPHONE (OUTPATIENT)
Dept: RHEUMATOLOGY | Facility: CLINIC | Age: 63
End: 2023-04-03
Payer: OTHER GOVERNMENT

## 2023-04-03 DIAGNOSIS — M05.79 RHEUMATOID ARTHRITIS INVOLVING MULTIPLE SITES WITH POSITIVE RHEUMATOID FACTOR: ICD-10-CM

## 2023-04-03 NOTE — TELEPHONE ENCOUNTER
Staff reports patient did not pick a prescription in needs it written again.  Will rewrite the prescription.

## 2023-05-16 ENCOUNTER — LAB VISIT (OUTPATIENT)
Dept: LAB | Facility: HOSPITAL | Age: 63
End: 2023-05-16
Attending: INTERNAL MEDICINE
Payer: OTHER GOVERNMENT

## 2023-05-16 ENCOUNTER — OFFICE VISIT (OUTPATIENT)
Dept: RHEUMATOLOGY | Facility: CLINIC | Age: 63
End: 2023-05-16
Payer: OTHER GOVERNMENT

## 2023-05-16 VITALS
WEIGHT: 220 LBS | HEIGHT: 67 IN | SYSTOLIC BLOOD PRESSURE: 151 MMHG | DIASTOLIC BLOOD PRESSURE: 95 MMHG | HEART RATE: 91 BPM | BODY MASS INDEX: 34.53 KG/M2

## 2023-05-16 DIAGNOSIS — D84.9 IMMUNOSUPPRESSION: ICD-10-CM

## 2023-05-16 DIAGNOSIS — R53.83 FATIGUE, UNSPECIFIED TYPE: ICD-10-CM

## 2023-05-16 DIAGNOSIS — M05.79 RHEUMATOID ARTHRITIS INVOLVING MULTIPLE SITES WITH POSITIVE RHEUMATOID FACTOR: ICD-10-CM

## 2023-05-16 LAB
ALBUMIN SERPL BCP-MCNC: 4.1 G/DL (ref 3.5–5.2)
ALP SERPL-CCNC: 105 U/L (ref 55–135)
ALT SERPL W/O P-5'-P-CCNC: 14 U/L (ref 10–44)
ANION GAP SERPL CALC-SCNC: 9 MMOL/L (ref 8–16)
AST SERPL-CCNC: 16 U/L (ref 10–40)
BASOPHILS # BLD AUTO: 0.03 K/UL (ref 0–0.2)
BASOPHILS NFR BLD: 0.6 % (ref 0–1.9)
BILIRUB SERPL-MCNC: 0.4 MG/DL (ref 0.1–1)
BUN SERPL-MCNC: 13 MG/DL (ref 8–23)
CALCIUM SERPL-MCNC: 9.8 MG/DL (ref 8.7–10.5)
CHLORIDE SERPL-SCNC: 108 MMOL/L (ref 95–110)
CO2 SERPL-SCNC: 25 MMOL/L (ref 23–29)
CREAT SERPL-MCNC: 0.9 MG/DL (ref 0.5–1.4)
CRP SERPL-MCNC: 1.7 MG/L (ref 0–8.2)
DIFFERENTIAL METHOD: ABNORMAL
EOSINOPHIL # BLD AUTO: 0.1 K/UL (ref 0–0.5)
EOSINOPHIL NFR BLD: 1.7 % (ref 0–8)
ERYTHROCYTE [DISTWIDTH] IN BLOOD BY AUTOMATED COUNT: 16.6 % (ref 11.5–14.5)
ERYTHROCYTE [SEDIMENTATION RATE] IN BLOOD BY PHOTOMETRIC METHOD: 11 MM/HR (ref 0–36)
EST. GFR  (NO RACE VARIABLE): >60 ML/MIN/1.73 M^2
GLUCOSE SERPL-MCNC: 91 MG/DL (ref 70–110)
HCT VFR BLD AUTO: 38.7 % (ref 37–48.5)
HGB BLD-MCNC: 11 G/DL (ref 12–16)
IMM GRANULOCYTES # BLD AUTO: 0.01 K/UL (ref 0–0.04)
IMM GRANULOCYTES NFR BLD AUTO: 0.2 % (ref 0–0.5)
LYMPHOCYTES # BLD AUTO: 1.2 K/UL (ref 1–4.8)
LYMPHOCYTES NFR BLD: 22.3 % (ref 18–48)
MCH RBC QN AUTO: 22.6 PG (ref 27–31)
MCHC RBC AUTO-ENTMCNC: 28.4 G/DL (ref 32–36)
MCV RBC AUTO: 80 FL (ref 82–98)
MONOCYTES # BLD AUTO: 0.4 K/UL (ref 0.3–1)
MONOCYTES NFR BLD: 6.9 % (ref 4–15)
NEUTROPHILS # BLD AUTO: 3.7 K/UL (ref 1.8–7.7)
NEUTROPHILS NFR BLD: 68.3 % (ref 38–73)
NRBC BLD-RTO: 0 /100 WBC
PLATELET # BLD AUTO: 253 K/UL (ref 150–450)
PMV BLD AUTO: 9.7 FL (ref 9.2–12.9)
POTASSIUM SERPL-SCNC: 4.2 MMOL/L (ref 3.5–5.1)
PROT SERPL-MCNC: 7.4 G/DL (ref 6–8.4)
RBC # BLD AUTO: 4.87 M/UL (ref 4–5.4)
SODIUM SERPL-SCNC: 142 MMOL/L (ref 136–145)
WBC # BLD AUTO: 5.37 K/UL (ref 3.9–12.7)

## 2023-05-16 PROCEDURE — 36415 COLL VENOUS BLD VENIPUNCTURE: CPT | Performed by: INTERNAL MEDICINE

## 2023-05-16 PROCEDURE — 85025 COMPLETE CBC W/AUTO DIFF WBC: CPT | Performed by: INTERNAL MEDICINE

## 2023-05-16 PROCEDURE — 99999 PR PBB SHADOW E&M-EST. PATIENT-LVL III: ICD-10-PCS | Mod: PBBFAC,,, | Performed by: INTERNAL MEDICINE

## 2023-05-16 PROCEDURE — 99214 OFFICE O/P EST MOD 30 MIN: CPT | Mod: S$PBB,,, | Performed by: INTERNAL MEDICINE

## 2023-05-16 PROCEDURE — 86140 C-REACTIVE PROTEIN: CPT | Performed by: INTERNAL MEDICINE

## 2023-05-16 PROCEDURE — 99214 PR OFFICE/OUTPT VISIT, EST, LEVL IV, 30-39 MIN: ICD-10-PCS | Mod: S$PBB,,, | Performed by: INTERNAL MEDICINE

## 2023-05-16 PROCEDURE — 99999 PR PBB SHADOW E&M-EST. PATIENT-LVL III: CPT | Mod: PBBFAC,,, | Performed by: INTERNAL MEDICINE

## 2023-05-16 PROCEDURE — 80053 COMPREHEN METABOLIC PANEL: CPT | Performed by: INTERNAL MEDICINE

## 2023-05-16 PROCEDURE — 85652 RBC SED RATE AUTOMATED: CPT | Performed by: INTERNAL MEDICINE

## 2023-05-16 PROCEDURE — 99213 OFFICE O/P EST LOW 20 MIN: CPT | Mod: PBBFAC | Performed by: INTERNAL MEDICINE

## 2023-05-16 RX ORDER — LEFLUNOMIDE 20 MG/1
20 TABLET ORAL DAILY
Qty: 90 TABLET | Refills: 0 | Status: SHIPPED | OUTPATIENT
Start: 2023-05-16 | End: 2023-09-11 | Stop reason: SDUPTHER

## 2023-05-16 NOTE — PROGRESS NOTES
Chief Complaint: Rheumatoid Arthritis    HPI:  Kendra Griffin is a 61 y.o. female with HTN, elevated cholesterol, anemia, and GERD sent in consultation by Dr. Armando Knox for RA.  Since July 2019 had multiple joint pain and decrease mobility.  RF and CCP were found to be positive.    She has seen ID and completed vaccinations except for one more vaccination due April 2020.  She is not currently on prednisone since it made it difficult for her to sleep.   She does not like the side effects     Stopped MTX 2 due to stomach upset.    Failed Humira.  [facility] but [facility].   Xeljanz given after Enbrel.      Interval History:    Compliant with Xeljanz.  She never received [facility]fl[facility]ide [facility].  1 hour morning stiffness.  Walking half an hour.     5/10 ache hands and legs.  Swelling daily in hands still.    Worse with any activity.   Nothing improves pain.    She will be receiving oral [facility]phosphonate first [facility] and then will let them know if she cannot tolerate it and he will switch to IV.             Review of Systems   Constitutional: Positive for unexpected weight change (gaining weight). Negative for fever.   HENT: Negative for trouble swallowing.     Eyes: Negative for redness.   Respiratory: Negative for cough and shortness of breath.    Cardiovascular: Negative for chest pain.   Gastrointestinal: Negative for constipation and diarrhea.   Genitourinary: Negative for dysuria and genital sores.   Musculoskeletal: Positive for arthralgias.   Skin: Negative for rash.   Neurological: Negative for headaches.  Dizziness  Hematological: Does not bruise/bleed easily.         Objective:        Physical Exam   Constitutional: She is oriented to person, place, and time and well-developed, well-nourished, and in no distress.   HENT:   Head: Normocephalic and atraumatic.   Eyes: Conjunctivae and EOM are normal.   Neurological: She is alert and oriented to  person, place, and time.   Psychiatric: Mood and affect normal.   CV:  RRR no murmur  LUNGS: CTA b/l   ABD:  No tenderness +BS  Musculoskeletal: 28 joint count: 0 swollen and 20 tender (all MCPs, all PIPs)   Pain on compression of MTPs           LABS    Component      Latest Ref Rng & Units 3/10/2023 3/10/2023 3/10/2023           9:49 AM  9:49 AM  9:49 AM   WBC      3.90 - 12.70 K/uL   5.69   RBC      4.00 - 5.40 M/uL   4.70   Hemoglobin      12.0 - 16.0 g/dL   10.8 (L)   Hematocrit      37.0 - 48.5 %   36.0 (L)   MCV      82 - 98 fL   77 (L)   MCH      27.0 - 31.0 pg   23.0 (L)   MCHC      32.0 - 36.0 g/dL   30.0 (L)   RDW      11.5 - 14.5 %   16.5 (H)   Platelets      150 - 450 K/uL   282   MPV      9.2 - 12.9 fL   10.1   Immature Granulocytes      0.0 - 0.5 %   0.2   Gran # (ANC)      1.8 - 7.7 K/uL   3.3   Immature Grans (Abs)      0.00 - 0.04 K/uL   0.01   Lymph #      1.0 - 4.8 K/uL   1.9   Mono #      0.3 - 1.0 K/uL   0.4   Eos #      0.0 - 0.5 K/uL   0.1   Baso #      0.00 - 0.20 K/uL   0.03   nRBC      0 /100 WBC   0   Gran %      38.0 - 73.0 %   57.8   Lymph %      18.0 - 48.0 %   32.7   Mono %      4.0 - 15.0 %   6.5   Eosinophil %      0.0 - 8.0 %   2.3   Basophil %      0.0 - 1.9 %   0.5   Differential Method         Automated   Sodium      136 - 145 mmol/L   141   Potassium      3.5 - 5.1 mmol/L   4.1   Chloride      95 - 110 mmol/L   108   CO2      23 - 29 mmol/L   25   Glucose      70 - 110 mg/dL   84   BUN      8 - 23 mg/dL   13   Creatinine      0.5 - 1.4 mg/dL   0.9   Calcium      8.7 - 10.5 mg/dL   9.9   PROTEIN TOTAL      6.0 - 8.4 g/dL   7.2   Albumin      3.5 - 5.2 g/dL   4.1   BILIRUBIN TOTAL      0.1 - 1.0 mg/dL   0.5   Alkaline Phosphatase      55 - 135 U/L   106   AST      10 - 40 U/L   18   ALT      10 - 44 U/L   16   Anion Gap      8 - 16 mmol/L   8   eGFR      >60 mL/min/1.73 m:2   >60.0   NIL      IU/mL   0.65009   TB1 - Nil      IU/mL   0.030   TB2 - Nil      IU/mL   0.018   Mitogen -  Nil      IU/mL   9.988   TB Gold Plus      Negative   Negative   Hep B S Ab      mIU/mL Non-reactive 5.70    Hepatitis B Surface Ag      Non-reactive   Non-reactive   Hep B Core Total Ab      Non-reactive   Non-reactive   Hepatitis C Ab      Non-reactive   Non-reactive   Sed Rate      0 - 36 mm/Hr   33   CRP      0.0 - 8.2 mg/L   2.4         [facility]    Left hand: Focal bony remodeling about the distal aspect of the left ulna is nonspecific and may be of little clinical significance, however, early changes of rheumatoid arthritis can appear at this site and may be correlated for clinically. Otherwise no evidence for erosive arthropathy identified. Relatively mild degenerative changes in both hands most evident about the thumb CMC joints.     Right hand:  No acute fracture or malalignment identified. Minimal degenerative osteophytic spurring about the bilateral DIP joints. Mild bilateral thumb CMC joint arthrosis. Focal bony remodeling about the distal aspect of the left ulna.. No localized soft tissue swelling.  No unintended radiopaque foreign body identified.      Assessment:       1.  Rheumatoid arthritis.  Manifested by joint pain, positive RF/CCP/SKYLA, elevated CRP.  Active on Xeljanz.  Not on leflunomide several months due to not receiving [facility].   2.  Hypertension.    3.  Hypercholesterolemia  4.  Anemia  5.  GERD with esophagitis  6.  Chronic back pain   7.  History of murmur.  Evaluated by cardiology and no significant issue found.  8.  Abnormal stress echo. Follow with primary.   9.  Burning and itching with Humira  10. Osteoporosis.  Told she will need teeth pulled inorder to receive osteoporosis medicine.  [facility] and [facility].    Plan:       1.  Continue Xeljanz.  Restart leflunomide 20 mg daily.  Repeat labs.  Patient continues to require disability.   2.  [facility] with [facility]  3.  Completed ID for vaccination  4.  [facility]  5.   Calcium 1,000 mg daily and vitamin D 1,000 units daily  6.  Had COVID vaccine booster and flu vaccine;      RTO in 6 months/prn

## 2023-05-16 NOTE — PROGRESS NOTES
Rapid3 Question Responses and Scores 5/16/2023   MDHAQ Score 1.4   Psychologic Score 3.3   Pain Score 4.5   When you awakened in the morning OVER THE LAST WEEK, did you feel stiff? Yes   If Yes, please indicate the number of hours until you are as limber as you will be for the day -   Fatigue Score 4   Global Health Score 4   RAPID3 Score 4.39     Answers submitted by the patient for this visit:  Rheumatology Questionnaire (Submitted on 5/16/2023)  fever: No  eye redness: No  mouth sores: No  headaches: No  shortness of breath: No  chest pain: No  trouble swallowing: No  diarrhea: No  constipation: No  unexpected weight change: No  genital sore: No  dysuria: No  During the last 3 days, have you had a skin rash?: No  Bruises or bleeds easily: No  cough: No

## 2023-05-17 ENCOUNTER — PATIENT MESSAGE (OUTPATIENT)
Dept: RHEUMATOLOGY | Facility: CLINIC | Age: 63
End: 2023-05-17
Payer: OTHER GOVERNMENT

## 2023-09-08 ENCOUNTER — PATIENT MESSAGE (OUTPATIENT)
Dept: RHEUMATOLOGY | Facility: CLINIC | Age: 63
End: 2023-09-08
Payer: OTHER GOVERNMENT

## 2023-09-08 ENCOUNTER — LAB VISIT (OUTPATIENT)
Dept: LAB | Facility: HOSPITAL | Age: 63
End: 2023-09-08
Attending: INTERNAL MEDICINE
Payer: OTHER GOVERNMENT

## 2023-09-08 DIAGNOSIS — M05.79 RHEUMATOID ARTHRITIS INVOLVING MULTIPLE SITES WITH POSITIVE RHEUMATOID FACTOR: ICD-10-CM

## 2023-09-08 DIAGNOSIS — D84.9 IMMUNOSUPPRESSION: ICD-10-CM

## 2023-09-08 DIAGNOSIS — R53.83 FATIGUE, UNSPECIFIED TYPE: ICD-10-CM

## 2023-09-08 LAB
ALBUMIN SERPL BCP-MCNC: 3.9 G/DL (ref 3.5–5.2)
ALP SERPL-CCNC: 95 U/L (ref 55–135)
ALT SERPL W/O P-5'-P-CCNC: 17 U/L (ref 10–44)
ANION GAP SERPL CALC-SCNC: 9 MMOL/L (ref 8–16)
AST SERPL-CCNC: 18 U/L (ref 10–40)
BASOPHILS # BLD AUTO: 0.03 K/UL (ref 0–0.2)
BASOPHILS NFR BLD: 0.6 % (ref 0–1.9)
BILIRUB SERPL-MCNC: 0.4 MG/DL (ref 0.1–1)
BUN SERPL-MCNC: 16 MG/DL (ref 8–23)
CALCIUM SERPL-MCNC: 9.4 MG/DL (ref 8.7–10.5)
CHLORIDE SERPL-SCNC: 107 MMOL/L (ref 95–110)
CO2 SERPL-SCNC: 25 MMOL/L (ref 23–29)
CREAT SERPL-MCNC: 0.8 MG/DL (ref 0.5–1.4)
CRP SERPL-MCNC: 1.8 MG/L (ref 0–8.2)
DIFFERENTIAL METHOD: ABNORMAL
EOSINOPHIL # BLD AUTO: 0.1 K/UL (ref 0–0.5)
EOSINOPHIL NFR BLD: 2.6 % (ref 0–8)
ERYTHROCYTE [DISTWIDTH] IN BLOOD BY AUTOMATED COUNT: 17.9 % (ref 11.5–14.5)
ERYTHROCYTE [SEDIMENTATION RATE] IN BLOOD BY PHOTOMETRIC METHOD: 21 MM/HR (ref 0–36)
EST. GFR  (NO RACE VARIABLE): >60 ML/MIN/1.73 M^2
GLUCOSE SERPL-MCNC: 111 MG/DL (ref 70–110)
HCT VFR BLD AUTO: 36.7 % (ref 37–48.5)
HGB BLD-MCNC: 10.6 G/DL (ref 12–16)
IMM GRANULOCYTES # BLD AUTO: 0.01 K/UL (ref 0–0.04)
IMM GRANULOCYTES NFR BLD AUTO: 0.2 % (ref 0–0.5)
LYMPHOCYTES # BLD AUTO: 1.3 K/UL (ref 1–4.8)
LYMPHOCYTES NFR BLD: 28.6 % (ref 18–48)
MCH RBC QN AUTO: 22.5 PG (ref 27–31)
MCHC RBC AUTO-ENTMCNC: 28.9 G/DL (ref 32–36)
MCV RBC AUTO: 78 FL (ref 82–98)
MONOCYTES # BLD AUTO: 0.4 K/UL (ref 0.3–1)
MONOCYTES NFR BLD: 8.4 % (ref 4–15)
NEUTROPHILS # BLD AUTO: 2.8 K/UL (ref 1.8–7.7)
NEUTROPHILS NFR BLD: 59.6 % (ref 38–73)
NRBC BLD-RTO: 0 /100 WBC
PLATELET # BLD AUTO: 256 K/UL (ref 150–450)
PMV BLD AUTO: 10.7 FL (ref 9.2–12.9)
POTASSIUM SERPL-SCNC: 4.1 MMOL/L (ref 3.5–5.1)
PROT SERPL-MCNC: 7.4 G/DL (ref 6–8.4)
RBC # BLD AUTO: 4.71 M/UL (ref 4–5.4)
SODIUM SERPL-SCNC: 141 MMOL/L (ref 136–145)
WBC # BLD AUTO: 4.62 K/UL (ref 3.9–12.7)

## 2023-09-08 PROCEDURE — 85652 RBC SED RATE AUTOMATED: CPT | Performed by: INTERNAL MEDICINE

## 2023-09-08 PROCEDURE — 80053 COMPREHEN METABOLIC PANEL: CPT | Performed by: INTERNAL MEDICINE

## 2023-09-08 PROCEDURE — 36415 COLL VENOUS BLD VENIPUNCTURE: CPT | Performed by: INTERNAL MEDICINE

## 2023-09-08 PROCEDURE — 86140 C-REACTIVE PROTEIN: CPT | Performed by: INTERNAL MEDICINE

## 2023-09-08 PROCEDURE — 85025 COMPLETE CBC W/AUTO DIFF WBC: CPT | Performed by: INTERNAL MEDICINE

## 2023-09-11 DIAGNOSIS — D84.9 IMMUNOSUPPRESSION: ICD-10-CM

## 2023-09-11 DIAGNOSIS — M05.79 RHEUMATOID ARTHRITIS INVOLVING MULTIPLE SITES WITH POSITIVE RHEUMATOID FACTOR: ICD-10-CM

## 2023-09-11 DIAGNOSIS — R53.83 FATIGUE, UNSPECIFIED TYPE: ICD-10-CM

## 2023-09-11 RX ORDER — LEFLUNOMIDE 20 MG/1
20 TABLET ORAL DAILY
Qty: 90 TABLET | Refills: 0 | Status: SHIPPED | OUTPATIENT
Start: 2023-09-11 | End: 2023-09-11 | Stop reason: SDUPTHER

## 2023-09-12 RX ORDER — LEFLUNOMIDE 20 MG/1
20 TABLET ORAL DAILY
Qty: 90 TABLET | Refills: 0 | Status: SHIPPED | OUTPATIENT
Start: 2023-09-12 | End: 2023-09-26 | Stop reason: SDUPTHER

## 2023-09-21 ENCOUNTER — TELEPHONE (OUTPATIENT)
Dept: RHEUMATOLOGY | Facility: CLINIC | Age: 63
End: 2023-09-21
Payer: OTHER GOVERNMENT

## 2023-09-21 DIAGNOSIS — M05.79 RHEUMATOID ARTHRITIS INVOLVING MULTIPLE SITES WITH POSITIVE RHEUMATOID FACTOR: ICD-10-CM

## 2023-09-21 NOTE — TELEPHONE ENCOUNTER
Request for prior authorization.  Will forward to our specialty pharmacy to get assistance on Xeljanz authorization.

## 2023-09-22 ENCOUNTER — PATIENT MESSAGE (OUTPATIENT)
Dept: RHEUMATOLOGY | Facility: CLINIC | Age: 63
End: 2023-09-22
Payer: OTHER GOVERNMENT

## 2023-09-22 DIAGNOSIS — D84.9 IMMUNOSUPPRESSION: ICD-10-CM

## 2023-09-22 DIAGNOSIS — M05.79 RHEUMATOID ARTHRITIS INVOLVING MULTIPLE SITES WITH POSITIVE RHEUMATOID FACTOR: ICD-10-CM

## 2023-09-22 DIAGNOSIS — R53.83 FATIGUE, UNSPECIFIED TYPE: ICD-10-CM

## 2023-09-26 RX ORDER — LEFLUNOMIDE 20 MG/1
20 TABLET ORAL DAILY
Qty: 90 TABLET | Refills: 0 | Status: SHIPPED | OUTPATIENT
Start: 2023-09-26 | End: 2024-01-18 | Stop reason: SDUPTHER

## 2023-10-03 DIAGNOSIS — M05.79 RHEUMATOID ARTHRITIS INVOLVING MULTIPLE SITES WITH POSITIVE RHEUMATOID FACTOR: ICD-10-CM

## 2023-10-09 DIAGNOSIS — Z12.31 ENCOUNTER FOR SCREENING MAMMOGRAM FOR MALIGNANT NEOPLASM OF BREAST: Primary | ICD-10-CM

## 2023-10-31 ENCOUNTER — PATIENT MESSAGE (OUTPATIENT)
Dept: RHEUMATOLOGY | Facility: CLINIC | Age: 63
End: 2023-10-31
Payer: OTHER GOVERNMENT

## 2023-11-02 ENCOUNTER — TELEPHONE (OUTPATIENT)
Dept: RHEUMATOLOGY | Facility: CLINIC | Age: 63
End: 2023-11-02
Payer: OTHER GOVERNMENT

## 2023-11-02 ENCOUNTER — PATIENT MESSAGE (OUTPATIENT)
Dept: RHEUMATOLOGY | Facility: CLINIC | Age: 63
End: 2023-11-02
Payer: OTHER GOVERNMENT

## 2023-11-02 NOTE — TELEPHONE ENCOUNTER
----- Message from Meredith Brooks MA sent at 11/1/2023 11:11 AM CDT -----  Regarding: FW: pt advice  Contact: Alessio [phone]    ----- Message -----  From: Yakelin Hall  Sent: 11/1/2023  10:18 AM CDT  To: Susan FRANZ Staff  Subject: pt advice                                        Alessio from [facility]  is calling to speak with someone in provider office to check the status of a Medical Questionnaire sent over on 10/06/23 Alessio is asking for a return call please call him  at [phone]

## 2023-12-04 ENCOUNTER — PATIENT MESSAGE (OUTPATIENT)
Dept: RHEUMATOLOGY | Facility: CLINIC | Age: 63
End: 2023-12-04
Payer: OTHER GOVERNMENT

## 2023-12-08 ENCOUNTER — LAB VISIT (OUTPATIENT)
Dept: LAB | Facility: HOSPITAL | Age: 63
End: 2023-12-08
Attending: INTERNAL MEDICINE
Payer: OTHER GOVERNMENT

## 2023-12-08 DIAGNOSIS — D84.9 IMMUNOSUPPRESSION: ICD-10-CM

## 2023-12-08 DIAGNOSIS — R53.83 FATIGUE, UNSPECIFIED TYPE: ICD-10-CM

## 2023-12-08 DIAGNOSIS — M05.79 RHEUMATOID ARTHRITIS INVOLVING MULTIPLE SITES WITH POSITIVE RHEUMATOID FACTOR: ICD-10-CM

## 2023-12-08 LAB
ALBUMIN SERPL BCP-MCNC: 3.8 G/DL (ref 3.5–5.2)
ALP SERPL-CCNC: 93 U/L (ref 55–135)
ALT SERPL W/O P-5'-P-CCNC: 78 U/L (ref 10–44)
ANION GAP SERPL CALC-SCNC: 6 MMOL/L (ref 8–16)
AST SERPL-CCNC: 52 U/L (ref 10–40)
BASOPHILS # BLD AUTO: 0.02 K/UL (ref 0–0.2)
BASOPHILS NFR BLD: 0.5 % (ref 0–1.9)
BILIRUB SERPL-MCNC: 0.4 MG/DL (ref 0.1–1)
BUN SERPL-MCNC: 11 MG/DL (ref 8–23)
CALCIUM SERPL-MCNC: 9.6 MG/DL (ref 8.7–10.5)
CHLORIDE SERPL-SCNC: 109 MMOL/L (ref 95–110)
CO2 SERPL-SCNC: 27 MMOL/L (ref 23–29)
CREAT SERPL-MCNC: 0.8 MG/DL (ref 0.5–1.4)
CRP SERPL-MCNC: 1.2 MG/L (ref 0–8.2)
DIFFERENTIAL METHOD: ABNORMAL
EOSINOPHIL # BLD AUTO: 0.2 K/UL (ref 0–0.5)
EOSINOPHIL NFR BLD: 4.1 % (ref 0–8)
ERYTHROCYTE [DISTWIDTH] IN BLOOD BY AUTOMATED COUNT: 16.8 % (ref 11.5–14.5)
ERYTHROCYTE [SEDIMENTATION RATE] IN BLOOD BY PHOTOMETRIC METHOD: 15 MM/HR (ref 0–36)
EST. GFR  (NO RACE VARIABLE): >60 ML/MIN/1.73 M^2
GLUCOSE SERPL-MCNC: 103 MG/DL (ref 70–110)
HCT VFR BLD AUTO: 36.7 % (ref 37–48.5)
HGB BLD-MCNC: 10.9 G/DL (ref 12–16)
IMM GRANULOCYTES # BLD AUTO: 0.01 K/UL (ref 0–0.04)
IMM GRANULOCYTES NFR BLD AUTO: 0.3 % (ref 0–0.5)
LYMPHOCYTES # BLD AUTO: 1.3 K/UL (ref 1–4.8)
LYMPHOCYTES NFR BLD: 32.7 % (ref 18–48)
MCH RBC QN AUTO: 22.5 PG (ref 27–31)
MCHC RBC AUTO-ENTMCNC: 29.7 G/DL (ref 32–36)
MCV RBC AUTO: 76 FL (ref 82–98)
MONOCYTES # BLD AUTO: 0.4 K/UL (ref 0.3–1)
MONOCYTES NFR BLD: 10.4 % (ref 4–15)
NEUTROPHILS # BLD AUTO: 2.1 K/UL (ref 1.8–7.7)
NEUTROPHILS NFR BLD: 52 % (ref 38–73)
NRBC BLD-RTO: 0 /100 WBC
PLATELET # BLD AUTO: 230 K/UL (ref 150–450)
PMV BLD AUTO: 10.6 FL (ref 9.2–12.9)
POTASSIUM SERPL-SCNC: 4.1 MMOL/L (ref 3.5–5.1)
PROT SERPL-MCNC: 7.1 G/DL (ref 6–8.4)
RBC # BLD AUTO: 4.84 M/UL (ref 4–5.4)
SODIUM SERPL-SCNC: 142 MMOL/L (ref 136–145)
WBC # BLD AUTO: 3.94 K/UL (ref 3.9–12.7)

## 2023-12-08 PROCEDURE — 80053 COMPREHEN METABOLIC PANEL: CPT | Performed by: INTERNAL MEDICINE

## 2023-12-08 PROCEDURE — 36415 COLL VENOUS BLD VENIPUNCTURE: CPT | Performed by: INTERNAL MEDICINE

## 2023-12-08 PROCEDURE — 85025 COMPLETE CBC W/AUTO DIFF WBC: CPT | Performed by: INTERNAL MEDICINE

## 2023-12-08 PROCEDURE — 85652 RBC SED RATE AUTOMATED: CPT | Performed by: INTERNAL MEDICINE

## 2023-12-08 PROCEDURE — 86140 C-REACTIVE PROTEIN: CPT | Performed by: INTERNAL MEDICINE

## 2023-12-11 ENCOUNTER — PATIENT MESSAGE (OUTPATIENT)
Dept: RHEUMATOLOGY | Facility: CLINIC | Age: 63
End: 2023-12-11
Payer: OTHER GOVERNMENT

## 2023-12-21 ENCOUNTER — LAB VISIT (OUTPATIENT)
Dept: LAB | Facility: HOSPITAL | Age: 63
End: 2023-12-21
Attending: INTERNAL MEDICINE
Payer: OTHER GOVERNMENT

## 2023-12-21 ENCOUNTER — TELEPHONE (OUTPATIENT)
Dept: RHEUMATOLOGY | Facility: CLINIC | Age: 63
End: 2023-12-21

## 2023-12-21 ENCOUNTER — OFFICE VISIT (OUTPATIENT)
Dept: RHEUMATOLOGY | Facility: CLINIC | Age: 63
End: 2023-12-21
Payer: OTHER GOVERNMENT

## 2023-12-21 VITALS
BODY MASS INDEX: 33.43 KG/M2 | WEIGHT: 213 LBS | SYSTOLIC BLOOD PRESSURE: 144 MMHG | HEIGHT: 67 IN | DIASTOLIC BLOOD PRESSURE: 95 MMHG | HEART RATE: 83 BPM

## 2023-12-21 DIAGNOSIS — R74.01 ELEVATED ALT MEASUREMENT: ICD-10-CM

## 2023-12-21 DIAGNOSIS — Z79.899 IMMUNOSUPPRESSION DUE TO DRUG THERAPY: ICD-10-CM

## 2023-12-21 DIAGNOSIS — R74.01 ELEVATED AST (SGOT): ICD-10-CM

## 2023-12-21 DIAGNOSIS — M05.79 RHEUMATOID ARTHRITIS INVOLVING MULTIPLE SITES WITH POSITIVE RHEUMATOID FACTOR: Primary | ICD-10-CM

## 2023-12-21 DIAGNOSIS — D84.821 IMMUNOSUPPRESSION DUE TO DRUG THERAPY: ICD-10-CM

## 2023-12-21 LAB
ALBUMIN SERPL BCP-MCNC: 4 G/DL (ref 3.5–5.2)
ALP SERPL-CCNC: 93 U/L (ref 55–135)
ALT SERPL W/O P-5'-P-CCNC: 48 U/L (ref 10–44)
AST SERPL-CCNC: 32 U/L (ref 10–40)
BILIRUB DIRECT SERPL-MCNC: 0.1 MG/DL (ref 0.1–0.3)
BILIRUB SERPL-MCNC: 0.4 MG/DL (ref 0.1–1)
PROT SERPL-MCNC: 7.6 G/DL (ref 6–8.4)

## 2023-12-21 PROCEDURE — 99214 PR OFFICE/OUTPT VISIT, EST, LEVL IV, 30-39 MIN: ICD-10-PCS | Mod: S$PBB,,, | Performed by: INTERNAL MEDICINE

## 2023-12-21 PROCEDURE — 99999 PR PBB SHADOW E&M-EST. PATIENT-LVL V: ICD-10-PCS | Mod: PBBFAC,,, | Performed by: INTERNAL MEDICINE

## 2023-12-21 PROCEDURE — 99214 OFFICE O/P EST MOD 30 MIN: CPT | Mod: S$PBB,,, | Performed by: INTERNAL MEDICINE

## 2023-12-21 PROCEDURE — 99999 PR PBB SHADOW E&M-EST. PATIENT-LVL V: CPT | Mod: PBBFAC,,, | Performed by: INTERNAL MEDICINE

## 2023-12-21 PROCEDURE — 80076 HEPATIC FUNCTION PANEL: CPT | Performed by: INTERNAL MEDICINE

## 2023-12-21 PROCEDURE — 99215 OFFICE O/P EST HI 40 MIN: CPT | Mod: PBBFAC | Performed by: INTERNAL MEDICINE

## 2023-12-21 PROCEDURE — 36415 COLL VENOUS BLD VENIPUNCTURE: CPT | Performed by: INTERNAL MEDICINE

## 2023-12-21 ASSESSMENT — ROUTINE ASSESSMENT OF PATIENT INDEX DATA (RAPID3)
WHEN YOU AWAKENED IN THE MORNING OVER THE LAST WEEK, PLEASE INDICATE THE AMOUNT OF TIME IT TAKES UNTIL YOU ARE AS LIMBER AS YOU WILL BE FOR THE DAY: 20 MIN
MDHAQ FUNCTION SCORE: 1
PATIENT GLOBAL ASSESSMENT SCORE: 3
FATIGUE SCORE: 2.5
TOTAL RAPID3 SCORE: 3.11
AM STIFFNESS SCORE: 1, YES
PSYCHOLOGICAL DISTRESS SCORE: 3.3
PAIN SCORE: 3

## 2023-12-21 NOTE — PROGRESS NOTES
Chief Complaint: Rheumatoid Arthritis    HPI:  Kendra Griffin is a 61 y.o. female with HTN, elevated cholesterol, anemia, and GERD sent in consultation by Dr. Armando Knox for RA.  Since July 2019 had multiple joint pain and decrease mobility.  RF and CCP were found to be positive.    She has seen ID and completed vaccinations except for one more vaccination due April 2020.  She is not currently on prednisone since it made it difficult for her to sleep.   She does not like the side effects     Stopped MTX 2 due to stomach upset.    Failed Humira.  [facility]brel [facility] for [facility] month but [facility].   Xeljanz given after Enbrel.      Interval History:    Compliant with Xeljanz and leflunomide 20 mg daily.  1 hour morning stiffness.  Walking half an hour once a week.     3/10 ache hands and shoulders.  Swelling daily in hands still.    Worse with any activity.   Nothing improves pain.    May 2023 [facility] but made her sick for 4 days so her doctor will consider changing the medication.             Review of Systems   Constitutional: Positive for unexpected weight change (gaining weight). Negative for fever.   HENT: Negative for trouble swallowing.     Eyes: Negative for redness.   Respiratory: Negative for cough and shortness of breath.    Cardiovascular: Negative for chest pain.   Gastrointestinal: Negative for constipation and diarrhea.   Genitourinary: Negative for dysuria and genital sores.   Musculoskeletal: Positive for arthralgias.   Skin: Negative for rash.   Neurological: Negative for headaches.  Dizziness  Hematological: Does not bruise/bleed easily.         Objective:        Physical Exam   Constitutional: She is oriented to person, place, and time and well-developed, well-nourished, and in no distress.   HENT:   Head: Normocephalic and atraumatic.   Eyes: Conjunctivae and EOM are normal.   Neurological: She is alert and oriented to person, place, and time.    Psychiatric: Mood and affect normal.   CV:  RRR no murmur  LUNGS: CTA b/l   ABD:  No tenderness +BS  Musculoskeletal: 28 joint count: 0 swollen and 12 tender (all MCPs and shoulders)   No pain on compression of MTPs           LABS    Component      Latest Ref Rn 12/8/2023   WBC      3.90 - 12.70 K/uL 3.94    RBC      4.00 - 5.40 M/uL 4.84    Hemoglobin      12.0 - 16.0 g/dL 10.9 (L)    Hematocrit      37.0 - 48.5 % 36.7 (L)    MCV      82 - 98 fL 76 (L)    MCH      27.0 - 31.0 pg 22.5 (L)    MCHC      32.0 - 36.0 g/dL 29.7 (L)    RDW      11.5 - 14.5 % 16.8 (H)    Platelet Count      150 - 450 K/uL 230    MPV      9.2 - 12.9 fL 10.6    Immature Granulocytes      0.0 - 0.5 % 0.3    Gran # (ANC)      1.8 - 7.7 K/uL 2.1    Immature Grans (Abs)      0.00 - 0.04 K/uL 0.01    Lymph #      1.0 - 4.8 K/uL 1.3    Mono #      0.3 - 1.0 K/uL 0.4    Eos #      0.0 - 0.5 K/uL 0.2    Baso #      0.00 - 0.20 K/uL 0.02    nRBC      0 /100 WBC 0    Gran %      38.0 - 73.0 % 52.0    Lymph %      18.0 - 48.0 % 32.7    Mono %      4.0 - 15.0 % 10.4    Eosinophil %      0.0 - 8.0 % 4.1    Basophil %      0.0 - 1.9 % 0.5    Differential Method Automated    Sodium      136 - 145 mmol/L 142    Potassium      3.5 - 5.1 mmol/L 4.1    Chloride      95 - 110 mmol/L 109    CO2      23 - 29 mmol/L 27    Glucose      70 - 110 mg/dL 103    BUN      8 - 23 mg/dL 11    Creatinine      0.5 - 1.4 mg/dL 0.8    Calcium      8.7 - 10.5 mg/dL 9.6    PROTEIN TOTAL      6.0 - 8.4 g/dL 7.1    Albumin      3.5 - 5.2 g/dL 3.8    BILIRUBIN TOTAL      0.1 - 1.0 mg/dL 0.4    ALP      55 - 135 U/L 93    AST      10 - 40 U/L 52 (H)    ALT      10 - 44 U/L 78 (H)    eGFR      >60 mL/min/1.73 m^2 >60.0    Anion Gap      8 - 16 mmol/L 6 (L)    Sed Rate      0 - 36 mm/Hr 15    CRP      0.0 - 8.2 mg/L 1.2       Legend:  (L) Low  (H) High    [facility]    Left hand: Focal bony remodeling about the distal aspect of the left ulna is nonspecific and may be  of little clinical significance, however, early changes of rheumatoid arthritis can appear at this site and may be correlated for clinically. Otherwise no evidence for erosive arthropathy identified. Relatively mild degenerative changes in both hands most evident about the thumb CMC joints.     Right hand:  No acute fracture or malalignment identified. Minimal degenerative osteophytic spurring about the bilateral DIP joints. Mild bilateral thumb CMC joint arthrosis. Focal bony remodeling about the distal aspect of the left ulna.. No localized soft tissue swelling.  No unintended radiopaque foreign body identified.      Assessment:       1.  Rheumatoid arthritis.  Manifested by joint pain, positive RF/CCP/SKYLA, elevated CRP.  Active on Xeljanz.  Not on leflunomide several months due to not receiving [facility].   2.  Hypertension.    3.  Hypercholesterolemia  4.  Anemia  5.  GERD with esophagitis  6.  Chronic back pain   7.  History of murmur.  Evaluated by cardiology and no significant issue found.  8.  Abnormal stress echo. Follow with primary.   9.  Burning and itching with Humira  10. Osteoporosis.  Told she will need teeth pulled inorder to receive osteoporosis medicine.  [facility] and [facility].    Plan:       1.  Repeat LFT if still elevated consider holding leflunomide which restarted last visit.  For now continue Xeljanz and leflunomide 20 mg daily.  Patient continues to require disability.   2.  [facility]  3.  Completed ID for vaccination  4.  [facility]  5.  Calcium 1,000 mg daily and vitamin D 1,000 units daily  6.  Had COVID vaccine booster and flu vaccine;      RTO in 6 months/prn

## 2023-12-21 NOTE — TELEPHONE ENCOUNTER
Patient informed of labs showing improvement in the AST and ALT.  Patient informed unable to accept monetary gives.  Gift card will be mailed back to patient.

## 2024-01-17 ENCOUNTER — PATIENT MESSAGE (OUTPATIENT)
Dept: RHEUMATOLOGY | Facility: CLINIC | Age: 64
End: 2024-01-17
Payer: OTHER GOVERNMENT

## 2024-01-17 DIAGNOSIS — R53.83 FATIGUE, UNSPECIFIED TYPE: ICD-10-CM

## 2024-01-17 DIAGNOSIS — M05.79 RHEUMATOID ARTHRITIS INVOLVING MULTIPLE SITES WITH POSITIVE RHEUMATOID FACTOR: ICD-10-CM

## 2024-01-17 DIAGNOSIS — D84.9 IMMUNOSUPPRESSION: ICD-10-CM

## 2024-01-18 RX ORDER — LEFLUNOMIDE 20 MG/1
20 TABLET ORAL DAILY
Qty: 90 TABLET | Refills: 0 | Status: SHIPPED | OUTPATIENT
Start: 2024-01-18 | End: 2024-05-06 | Stop reason: SDUPTHER

## 2024-01-22 ENCOUNTER — HOSPITAL ENCOUNTER (OUTPATIENT)
Dept: RADIOLOGY | Facility: HOSPITAL | Age: 64
Discharge: HOME | End: 2024-01-22
Payer: OTHER GOVERNMENT

## 2024-01-22 DIAGNOSIS — Z12.31 ENCOUNTER FOR SCREENING MAMMOGRAM FOR MALIGNANT NEOPLASM OF BREAST: ICD-10-CM

## 2024-01-22 PROCEDURE — 77063 BREAST TOMOSYNTHESIS BI: CPT | Mod: 26,,, | Performed by: RADIOLOGY

## 2024-01-22 PROCEDURE — 77067 SCR MAMMO BI INCL CAD: CPT | Mod: 26,,, | Performed by: RADIOLOGY

## 2024-01-22 PROCEDURE — 77067 SCR MAMMO BI INCL CAD: CPT | Mod: TC,PO

## 2024-03-21 ENCOUNTER — LAB VISIT (OUTPATIENT)
Dept: LAB | Facility: HOSPITAL | Age: 64
End: 2024-03-21
Attending: INTERNAL MEDICINE
Payer: OTHER GOVERNMENT

## 2024-03-21 ENCOUNTER — PATIENT MESSAGE (OUTPATIENT)
Dept: RHEUMATOLOGY | Facility: CLINIC | Age: 64
End: 2024-03-21
Payer: OTHER GOVERNMENT

## 2024-03-21 DIAGNOSIS — M05.79 RHEUMATOID ARTHRITIS INVOLVING MULTIPLE SITES WITH POSITIVE RHEUMATOID FACTOR: ICD-10-CM

## 2024-03-21 DIAGNOSIS — D84.9 IMMUNOSUPPRESSION: ICD-10-CM

## 2024-03-21 DIAGNOSIS — D84.821 IMMUNOSUPPRESSION DUE TO DRUG THERAPY: ICD-10-CM

## 2024-03-21 DIAGNOSIS — R53.83 FATIGUE, UNSPECIFIED TYPE: ICD-10-CM

## 2024-03-21 DIAGNOSIS — Z79.899 IMMUNOSUPPRESSION DUE TO DRUG THERAPY: ICD-10-CM

## 2024-03-21 LAB
ALBUMIN SERPL BCP-MCNC: 4.1 G/DL (ref 3.5–5.2)
ALP SERPL-CCNC: 85 U/L (ref 55–135)
ALT SERPL W/O P-5'-P-CCNC: 14 U/L (ref 10–44)
ANION GAP SERPL CALC-SCNC: 8 MMOL/L (ref 8–16)
AST SERPL-CCNC: 16 U/L (ref 10–40)
BASOPHILS # BLD AUTO: 0.03 K/UL (ref 0–0.2)
BASOPHILS NFR BLD: 0.6 % (ref 0–1.9)
BILIRUB SERPL-MCNC: 0.3 MG/DL (ref 0.1–1)
BUN SERPL-MCNC: 12 MG/DL (ref 8–23)
CALCIUM SERPL-MCNC: 10.2 MG/DL (ref 8.7–10.5)
CHLORIDE SERPL-SCNC: 108 MMOL/L (ref 95–110)
CO2 SERPL-SCNC: 26 MMOL/L (ref 23–29)
CREAT SERPL-MCNC: 0.9 MG/DL (ref 0.5–1.4)
CRP SERPL-MCNC: 1.1 MG/L (ref 0–8.2)
DIFFERENTIAL METHOD BLD: ABNORMAL
EOSINOPHIL # BLD AUTO: 0.1 K/UL (ref 0–0.5)
EOSINOPHIL NFR BLD: 2.8 % (ref 0–8)
ERYTHROCYTE [DISTWIDTH] IN BLOOD BY AUTOMATED COUNT: 17 % (ref 11.5–14.5)
ERYTHROCYTE [SEDIMENTATION RATE] IN BLOOD BY PHOTOMETRIC METHOD: 32 MM/HR (ref 0–36)
EST. GFR  (NO RACE VARIABLE): >60 ML/MIN/1.73 M^2
GLUCOSE SERPL-MCNC: 106 MG/DL (ref 70–110)
HBV CORE AB SERPL QL IA: NORMAL
HBV SURFACE AB SER-ACNC: 7.46 MIU/ML
HBV SURFACE AB SER-ACNC: NORMAL M[IU]/ML
HBV SURFACE AG SERPL QL IA: NORMAL
HCT VFR BLD AUTO: 37.8 % (ref 37–48.5)
HCV AB SERPL QL IA: NORMAL
HGB BLD-MCNC: 10.9 G/DL (ref 12–16)
IMM GRANULOCYTES # BLD AUTO: 0 K/UL (ref 0–0.04)
IMM GRANULOCYTES NFR BLD AUTO: 0 % (ref 0–0.5)
LYMPHOCYTES # BLD AUTO: 1.3 K/UL (ref 1–4.8)
LYMPHOCYTES NFR BLD: 25.9 % (ref 18–48)
MCH RBC QN AUTO: 23 PG (ref 27–31)
MCHC RBC AUTO-ENTMCNC: 28.8 G/DL (ref 32–36)
MCV RBC AUTO: 80 FL (ref 82–98)
MONOCYTES # BLD AUTO: 0.3 K/UL (ref 0.3–1)
MONOCYTES NFR BLD: 6.7 % (ref 4–15)
NEUTROPHILS # BLD AUTO: 3.2 K/UL (ref 1.8–7.7)
NEUTROPHILS NFR BLD: 64 % (ref 38–73)
NRBC BLD-RTO: 0 /100 WBC
PLATELET # BLD AUTO: 250 K/UL (ref 150–450)
PMV BLD AUTO: 10.8 FL (ref 9.2–12.9)
POTASSIUM SERPL-SCNC: 4.7 MMOL/L (ref 3.5–5.1)
PROT SERPL-MCNC: 7.2 G/DL (ref 6–8.4)
RBC # BLD AUTO: 4.74 M/UL (ref 4–5.4)
SODIUM SERPL-SCNC: 142 MMOL/L (ref 136–145)
WBC # BLD AUTO: 5.05 K/UL (ref 3.9–12.7)

## 2024-03-21 PROCEDURE — 36415 COLL VENOUS BLD VENIPUNCTURE: CPT | Performed by: INTERNAL MEDICINE

## 2024-03-21 PROCEDURE — 87340 HEPATITIS B SURFACE AG IA: CPT | Performed by: INTERNAL MEDICINE

## 2024-03-21 PROCEDURE — 86706 HEP B SURFACE ANTIBODY: CPT | Performed by: INTERNAL MEDICINE

## 2024-03-21 PROCEDURE — 85652 RBC SED RATE AUTOMATED: CPT | Performed by: INTERNAL MEDICINE

## 2024-03-21 PROCEDURE — 86803 HEPATITIS C AB TEST: CPT | Performed by: INTERNAL MEDICINE

## 2024-03-21 PROCEDURE — 85025 COMPLETE CBC W/AUTO DIFF WBC: CPT | Performed by: INTERNAL MEDICINE

## 2024-03-21 PROCEDURE — 86704 HEP B CORE ANTIBODY TOTAL: CPT | Performed by: INTERNAL MEDICINE

## 2024-03-21 PROCEDURE — 80053 COMPREHEN METABOLIC PANEL: CPT | Performed by: INTERNAL MEDICINE

## 2024-03-21 PROCEDURE — 86140 C-REACTIVE PROTEIN: CPT | Performed by: INTERNAL MEDICINE

## 2024-05-02 ENCOUNTER — LAB VISIT (OUTPATIENT)
Dept: LAB | Facility: HOSPITAL | Age: 64
End: 2024-05-02
Attending: INTERNAL MEDICINE
Payer: OTHER GOVERNMENT

## 2024-05-02 ENCOUNTER — PATIENT MESSAGE (OUTPATIENT)
Dept: RHEUMATOLOGY | Facility: CLINIC | Age: 64
End: 2024-05-02
Payer: OTHER GOVERNMENT

## 2024-05-02 DIAGNOSIS — R53.83 FATIGUE, UNSPECIFIED TYPE: ICD-10-CM

## 2024-05-02 DIAGNOSIS — D84.9 IMMUNOSUPPRESSION: ICD-10-CM

## 2024-05-02 DIAGNOSIS — M05.79 RHEUMATOID ARTHRITIS INVOLVING MULTIPLE SITES WITH POSITIVE RHEUMATOID FACTOR: ICD-10-CM

## 2024-05-02 LAB
ALBUMIN SERPL BCP-MCNC: 3.8 G/DL (ref 3.5–5.2)
ALP SERPL-CCNC: 86 U/L (ref 55–135)
ALT SERPL W/O P-5'-P-CCNC: 22 U/L (ref 10–44)
ANION GAP SERPL CALC-SCNC: 6 MMOL/L (ref 8–16)
AST SERPL-CCNC: 20 U/L (ref 10–40)
BASOPHILS # BLD AUTO: 0.02 K/UL (ref 0–0.2)
BASOPHILS NFR BLD: 0.5 % (ref 0–1.9)
BILIRUB SERPL-MCNC: 0.3 MG/DL (ref 0.1–1)
BUN SERPL-MCNC: 10 MG/DL (ref 8–23)
CALCIUM SERPL-MCNC: 9.6 MG/DL (ref 8.7–10.5)
CHLORIDE SERPL-SCNC: 107 MMOL/L (ref 95–110)
CO2 SERPL-SCNC: 28 MMOL/L (ref 23–29)
CREAT SERPL-MCNC: 0.8 MG/DL (ref 0.5–1.4)
CRP SERPL-MCNC: 1.3 MG/L (ref 0–8.2)
DIFFERENTIAL METHOD BLD: ABNORMAL
EOSINOPHIL # BLD AUTO: 0.1 K/UL (ref 0–0.5)
EOSINOPHIL NFR BLD: 2.6 % (ref 0–8)
ERYTHROCYTE [DISTWIDTH] IN BLOOD BY AUTOMATED COUNT: 17.1 % (ref 11.5–14.5)
ERYTHROCYTE [SEDIMENTATION RATE] IN BLOOD BY PHOTOMETRIC METHOD: 40 MM/HR (ref 0–36)
EST. GFR  (NO RACE VARIABLE): >60 ML/MIN/1.73 M^2
GLUCOSE SERPL-MCNC: 91 MG/DL (ref 70–110)
HCT VFR BLD AUTO: 37.5 % (ref 37–48.5)
HGB BLD-MCNC: 10.9 G/DL (ref 12–16)
IMM GRANULOCYTES # BLD AUTO: 0.01 K/UL (ref 0–0.04)
IMM GRANULOCYTES NFR BLD AUTO: 0.2 % (ref 0–0.5)
LYMPHOCYTES # BLD AUTO: 1.3 K/UL (ref 1–4.8)
LYMPHOCYTES NFR BLD: 29.8 % (ref 18–48)
MCH RBC QN AUTO: 23.1 PG (ref 27–31)
MCHC RBC AUTO-ENTMCNC: 29.1 G/DL (ref 32–36)
MCV RBC AUTO: 79 FL (ref 82–98)
MONOCYTES # BLD AUTO: 0.4 K/UL (ref 0.3–1)
MONOCYTES NFR BLD: 8.6 % (ref 4–15)
NEUTROPHILS # BLD AUTO: 2.5 K/UL (ref 1.8–7.7)
NEUTROPHILS NFR BLD: 58.3 % (ref 38–73)
NRBC BLD-RTO: 0 /100 WBC
PLATELET # BLD AUTO: 259 K/UL (ref 150–450)
PMV BLD AUTO: 10.7 FL (ref 9.2–12.9)
POTASSIUM SERPL-SCNC: 4.2 MMOL/L (ref 3.5–5.1)
PROT SERPL-MCNC: 7.3 G/DL (ref 6–8.4)
RBC # BLD AUTO: 4.72 M/UL (ref 4–5.4)
SODIUM SERPL-SCNC: 141 MMOL/L (ref 136–145)
WBC # BLD AUTO: 4.3 K/UL (ref 3.9–12.7)

## 2024-05-02 PROCEDURE — 80053 COMPREHEN METABOLIC PANEL: CPT | Performed by: INTERNAL MEDICINE

## 2024-05-02 PROCEDURE — 36415 COLL VENOUS BLD VENIPUNCTURE: CPT | Performed by: INTERNAL MEDICINE

## 2024-05-02 PROCEDURE — 86140 C-REACTIVE PROTEIN: CPT | Performed by: INTERNAL MEDICINE

## 2024-05-02 PROCEDURE — 85652 RBC SED RATE AUTOMATED: CPT | Performed by: INTERNAL MEDICINE

## 2024-05-02 PROCEDURE — 85025 COMPLETE CBC W/AUTO DIFF WBC: CPT | Performed by: INTERNAL MEDICINE

## 2024-05-06 RX ORDER — LEFLUNOMIDE 20 MG/1
20 TABLET ORAL DAILY
Qty: 90 TABLET | Refills: 0 | Status: SHIPPED | OUTPATIENT
Start: 2024-05-06

## 2024-07-03 ENCOUNTER — TELEPHONE (OUTPATIENT)
Dept: RHEUMATOLOGY | Facility: CLINIC | Age: 64
End: 2024-07-03
Payer: OTHER GOVERNMENT

## 2024-07-23 ENCOUNTER — PATIENT MESSAGE (OUTPATIENT)
Dept: RHEUMATOLOGY | Facility: CLINIC | Age: 64
End: 2024-07-23
Payer: OTHER GOVERNMENT

## 2024-08-05 ENCOUNTER — OFFICE VISIT (OUTPATIENT)
Dept: RHEUMATOLOGY | Facility: CLINIC | Age: 64
End: 2024-08-05
Payer: OTHER GOVERNMENT

## 2024-08-05 VITALS
SYSTOLIC BLOOD PRESSURE: 141 MMHG | HEIGHT: 67 IN | HEART RATE: 77 BPM | DIASTOLIC BLOOD PRESSURE: 93 MMHG | WEIGHT: 207.25 LBS | BODY MASS INDEX: 32.53 KG/M2

## 2024-08-05 DIAGNOSIS — R53.83 FATIGUE, UNSPECIFIED TYPE: ICD-10-CM

## 2024-08-05 DIAGNOSIS — D84.821 IMMUNOSUPPRESSION DUE TO DRUG THERAPY: ICD-10-CM

## 2024-08-05 DIAGNOSIS — E66.9 OBESITY, UNSPECIFIED CLASSIFICATION, UNSPECIFIED OBESITY TYPE, UNSPECIFIED WHETHER SERIOUS COMORBIDITY PRESENT: ICD-10-CM

## 2024-08-05 DIAGNOSIS — Z79.899 IMMUNOSUPPRESSION DUE TO DRUG THERAPY: ICD-10-CM

## 2024-08-05 DIAGNOSIS — M05.79 RHEUMATOID ARTHRITIS INVOLVING MULTIPLE SITES WITH POSITIVE RHEUMATOID FACTOR: Primary | ICD-10-CM

## 2024-08-05 PROCEDURE — 99215 OFFICE O/P EST HI 40 MIN: CPT | Mod: PBBFAC | Performed by: INTERNAL MEDICINE

## 2024-08-05 PROCEDURE — 99999 PR PBB SHADOW E&M-EST. PATIENT-LVL V: CPT | Mod: PBBFAC,,, | Performed by: INTERNAL MEDICINE

## 2024-08-05 PROCEDURE — 99214 OFFICE O/P EST MOD 30 MIN: CPT | Mod: S$PBB,,, | Performed by: INTERNAL MEDICINE

## 2024-08-05 RX ORDER — UPADACITINIB 15 MG/1
15 TABLET, EXTENDED RELEASE ORAL DAILY
Qty: 30 TABLET | Refills: 2 | Status: SHIPPED | OUTPATIENT
Start: 2024-08-05

## 2024-08-06 ENCOUNTER — HOSPITAL ENCOUNTER (OUTPATIENT)
Dept: RADIOLOGY | Facility: HOSPITAL | Age: 64
Discharge: HOME | End: 2024-08-06
Attending: INTERNAL MEDICINE
Payer: OTHER GOVERNMENT

## 2024-08-06 DIAGNOSIS — M05.79 RHEUMATOID ARTHRITIS INVOLVING MULTIPLE SITES WITH POSITIVE RHEUMATOID FACTOR: ICD-10-CM

## 2024-08-06 DIAGNOSIS — D84.821 IMMUNOSUPPRESSION DUE TO DRUG THERAPY: ICD-10-CM

## 2024-08-06 DIAGNOSIS — E66.9 OBESITY, UNSPECIFIED CLASSIFICATION, UNSPECIFIED OBESITY TYPE, UNSPECIFIED WHETHER SERIOUS COMORBIDITY PRESENT: ICD-10-CM

## 2024-08-06 DIAGNOSIS — Z79.899 IMMUNOSUPPRESSION DUE TO DRUG THERAPY: ICD-10-CM

## 2024-08-06 DIAGNOSIS — R53.83 FATIGUE, UNSPECIFIED TYPE: ICD-10-CM

## 2024-08-06 PROCEDURE — 77077 JOINT SURVEY SINGLE VIEW: CPT | Mod: TC,PO

## 2024-08-06 PROCEDURE — 77077 JOINT SURVEY SINGLE VIEW: CPT | Mod: 26,,, | Performed by: RADIOLOGY

## 2024-08-07 ENCOUNTER — PATIENT MESSAGE (OUTPATIENT)
Dept: RHEUMATOLOGY | Facility: CLINIC | Age: 64
End: 2024-08-07
Payer: OTHER GOVERNMENT

## 2024-09-13 ENCOUNTER — PATIENT MESSAGE (OUTPATIENT)
Dept: RHEUMATOLOGY | Facility: CLINIC | Age: 64
End: 2024-09-13
Payer: OTHER GOVERNMENT

## 2024-09-13 DIAGNOSIS — M05.79 RHEUMATOID ARTHRITIS INVOLVING MULTIPLE SITES WITH POSITIVE RHEUMATOID FACTOR: ICD-10-CM

## 2024-09-16 ENCOUNTER — TELEPHONE (OUTPATIENT)
Dept: RHEUMATOLOGY | Facility: CLINIC | Age: 64
End: 2024-09-16
Payer: OTHER GOVERNMENT

## 2024-09-16 DIAGNOSIS — M05.79 RHEUMATOID ARTHRITIS INVOLVING MULTIPLE SITES WITH POSITIVE RHEUMATOID FACTOR: ICD-10-CM

## 2024-09-16 RX ORDER — UPADACITINIB 15 MG/1
15 TABLET, EXTENDED RELEASE ORAL DAILY
Qty: 30 TABLET | Refills: 2 | Status: SHIPPED | OUTPATIENT
Start: 2024-09-16 | End: 2024-09-16 | Stop reason: SDUPTHER

## 2024-09-16 RX ORDER — PREDNISONE 10 MG/1
10 TABLET ORAL DAILY
Qty: 30 TABLET | Refills: 0 | Status: SHIPPED | OUTPATIENT
Start: 2024-09-16 | End: 2024-09-20 | Stop reason: SDUPTHER

## 2024-09-16 RX ORDER — UPADACITINIB 15 MG/1
15 TABLET, EXTENDED RELEASE ORAL DAILY
Qty: 30 TABLET | Refills: 2 | Status: SHIPPED | OUTPATIENT
Start: 2024-09-16 | End: 2024-09-17 | Stop reason: SDUPTHER

## 2024-09-17 ENCOUNTER — TELEPHONE (OUTPATIENT)
Dept: RHEUMATOLOGY | Facility: CLINIC | Age: 64
End: 2024-09-17
Payer: OTHER GOVERNMENT

## 2024-09-17 DIAGNOSIS — M05.79 RHEUMATOID ARTHRITIS INVOLVING MULTIPLE SITES WITH POSITIVE RHEUMATOID FACTOR: ICD-10-CM

## 2024-09-17 RX ORDER — UPADACITINIB 15 MG/1
15 TABLET, EXTENDED RELEASE ORAL DAILY
Qty: 30 TABLET | Refills: 2 | Status: SHIPPED | OUTPATIENT
Start: 2024-09-17

## 2024-09-17 RX ORDER — UPADACITINIB 15 MG/1
15 TABLET, EXTENDED RELEASE ORAL DAILY
Qty: 30 TABLET | Refills: 2 | Status: SHIPPED | OUTPATIENT
Start: 2024-09-17 | End: 2024-09-17 | Stop reason: SDUPTHER

## 2024-09-17 NOTE — TELEPHONE ENCOUNTER
PharmD Ethan Christie given denial paperwork for Rin[employer].  He will assist with the process.  Will send prescription to [facility].

## 2024-09-17 NOTE — PROGRESS NOTES
PA request response letter received from [facility]. [employer]in[employer] not initially sent to [employer] for benefits investigation and review. Re-routing and reaching out to team for review. This is a first level denial now requiring an appeal.

## 2024-09-20 RX ORDER — PREDNISONE 10 MG/1
10 TABLET ORAL DAILY
Qty: 30 TABLET | Refills: 0 | Status: SHIPPED | OUTPATIENT
Start: 2024-09-20

## 2024-10-09 ENCOUNTER — PATIENT MESSAGE (OUTPATIENT)
Dept: RHEUMATOLOGY | Facility: CLINIC | Age: 64
End: 2024-10-09
Payer: OTHER GOVERNMENT

## 2024-10-09 DIAGNOSIS — M05.79 RHEUMATOID ARTHRITIS INVOLVING MULTIPLE SITES WITH POSITIVE RHEUMATOID FACTOR: Primary | ICD-10-CM

## 2024-10-09 DIAGNOSIS — Z79.899 IMMUNOSUPPRESSION DUE TO DRUG THERAPY: ICD-10-CM

## 2024-10-09 DIAGNOSIS — R53.83 FATIGUE, UNSPECIFIED TYPE: ICD-10-CM

## 2024-10-09 DIAGNOSIS — D84.821 IMMUNOSUPPRESSION DUE TO DRUG THERAPY: ICD-10-CM

## 2024-10-09 DIAGNOSIS — E78.00 PURE HYPERCHOLESTEROLEMIA: Chronic | ICD-10-CM

## 2024-10-09 DIAGNOSIS — I10 ESSENTIAL (PRIMARY) HYPERTENSION: Chronic | ICD-10-CM

## 2024-11-11 ENCOUNTER — LAB VISIT (OUTPATIENT)
Dept: LAB | Facility: HOSPITAL | Age: 64
End: 2024-11-11
Attending: INTERNAL MEDICINE
Payer: OTHER GOVERNMENT

## 2024-11-11 DIAGNOSIS — M05.79 RHEUMATOID ARTHRITIS INVOLVING MULTIPLE SITES WITH POSITIVE RHEUMATOID FACTOR: ICD-10-CM

## 2024-11-11 DIAGNOSIS — D84.821 IMMUNOSUPPRESSION DUE TO DRUG THERAPY: ICD-10-CM

## 2024-11-11 DIAGNOSIS — R53.83 FATIGUE, UNSPECIFIED TYPE: ICD-10-CM

## 2024-11-11 DIAGNOSIS — Z79.899 IMMUNOSUPPRESSION DUE TO DRUG THERAPY: ICD-10-CM

## 2024-11-11 LAB
ALBUMIN SERPL BCP-MCNC: 4.1 G/DL (ref 3.5–5.2)
ALP SERPL-CCNC: 70 U/L (ref 40–150)
ALT SERPL W/O P-5'-P-CCNC: 21 U/L (ref 10–44)
ANION GAP SERPL CALC-SCNC: 10 MMOL/L (ref 8–16)
AST SERPL-CCNC: 19 U/L (ref 10–40)
BASOPHILS # BLD AUTO: 0.03 K/UL (ref 0–0.2)
BASOPHILS NFR BLD: 0.6 % (ref 0–1.9)
BILIRUB SERPL-MCNC: 0.4 MG/DL (ref 0.1–1)
BUN SERPL-MCNC: 12 MG/DL (ref 8–23)
CALCIUM SERPL-MCNC: 9.5 MG/DL (ref 8.7–10.5)
CHLORIDE SERPL-SCNC: 108 MMOL/L (ref 95–110)
CO2 SERPL-SCNC: 24 MMOL/L (ref 23–29)
CREAT SERPL-MCNC: 0.8 MG/DL (ref 0.5–1.4)
CRP SERPL-MCNC: <0.3 MG/L (ref 0–8.2)
DIFFERENTIAL METHOD BLD: ABNORMAL
EOSINOPHIL # BLD AUTO: 0.1 K/UL (ref 0–0.5)
EOSINOPHIL NFR BLD: 1.9 % (ref 0–8)
ERYTHROCYTE [DISTWIDTH] IN BLOOD BY AUTOMATED COUNT: 18.4 % (ref 11.5–14.5)
ERYTHROCYTE [SEDIMENTATION RATE] IN BLOOD BY PHOTOMETRIC METHOD: 17 MM/HR (ref 0–36)
EST. GFR  (NO RACE VARIABLE): >60 ML/MIN/1.73 M^2
GLUCOSE SERPL-MCNC: 110 MG/DL (ref 70–110)
HCT VFR BLD AUTO: 35.1 % (ref 37–48.5)
HGB BLD-MCNC: 10.6 G/DL (ref 12–16)
IMM GRANULOCYTES # BLD AUTO: 0.02 K/UL (ref 0–0.04)
IMM GRANULOCYTES NFR BLD AUTO: 0.4 % (ref 0–0.5)
LYMPHOCYTES # BLD AUTO: 1.5 K/UL (ref 1–4.8)
LYMPHOCYTES NFR BLD: 31.8 % (ref 18–48)
MCH RBC QN AUTO: 23.6 PG (ref 27–31)
MCHC RBC AUTO-ENTMCNC: 30.2 G/DL (ref 32–36)
MCV RBC AUTO: 78 FL (ref 82–98)
MONOCYTES # BLD AUTO: 0.3 K/UL (ref 0.3–1)
MONOCYTES NFR BLD: 5.9 % (ref 4–15)
NEUTROPHILS # BLD AUTO: 2.8 K/UL (ref 1.8–7.7)
NEUTROPHILS NFR BLD: 59.4 % (ref 38–73)
NRBC BLD-RTO: 0 /100 WBC
PLATELET # BLD AUTO: 241 K/UL (ref 150–450)
PMV BLD AUTO: 10.7 FL (ref 9.2–12.9)
POTASSIUM SERPL-SCNC: 4 MMOL/L (ref 3.5–5.1)
PROT SERPL-MCNC: 6.8 G/DL (ref 6–8.4)
RBC # BLD AUTO: 4.5 M/UL (ref 4–5.4)
SODIUM SERPL-SCNC: 142 MMOL/L (ref 136–145)
WBC # BLD AUTO: 4.78 K/UL (ref 3.9–12.7)

## 2024-11-11 PROCEDURE — 86140 C-REACTIVE PROTEIN: CPT | Performed by: INTERNAL MEDICINE

## 2024-11-11 PROCEDURE — 85025 COMPLETE CBC W/AUTO DIFF WBC: CPT | Performed by: INTERNAL MEDICINE

## 2024-11-11 PROCEDURE — 80053 COMPREHEN METABOLIC PANEL: CPT | Performed by: INTERNAL MEDICINE

## 2024-11-11 PROCEDURE — 36415 COLL VENOUS BLD VENIPUNCTURE: CPT | Mod: PO | Performed by: INTERNAL MEDICINE

## 2024-11-11 PROCEDURE — 85652 RBC SED RATE AUTOMATED: CPT | Performed by: INTERNAL MEDICINE

## 2024-12-30 DIAGNOSIS — M05.79 RHEUMATOID ARTHRITIS INVOLVING MULTIPLE SITES WITH POSITIVE RHEUMATOID FACTOR: ICD-10-CM

## 2025-01-02 RX ORDER — UPADACITINIB 15 MG/1
15 TABLET, EXTENDED RELEASE ORAL DAILY
Qty: 30 TABLET | Refills: 2 | Status: SHIPPED | OUTPATIENT
Start: 2025-01-02

## 2025-01-13 DIAGNOSIS — R53.83 FATIGUE, UNSPECIFIED TYPE: ICD-10-CM

## 2025-01-13 DIAGNOSIS — M05.79 RHEUMATOID ARTHRITIS INVOLVING MULTIPLE SITES WITH POSITIVE RHEUMATOID FACTOR: ICD-10-CM

## 2025-01-13 DIAGNOSIS — D84.9 IMMUNOSUPPRESSION: ICD-10-CM

## 2025-01-16 ENCOUNTER — PATIENT MESSAGE (OUTPATIENT)
Dept: RHEUMATOLOGY | Facility: CLINIC | Age: 65
End: 2025-01-16
Payer: OTHER GOVERNMENT

## 2025-01-16 DIAGNOSIS — D84.9 IMMUNOSUPPRESSION: ICD-10-CM

## 2025-01-16 DIAGNOSIS — R53.83 FATIGUE, UNSPECIFIED TYPE: ICD-10-CM

## 2025-01-16 DIAGNOSIS — M05.79 RHEUMATOID ARTHRITIS INVOLVING MULTIPLE SITES WITH POSITIVE RHEUMATOID FACTOR: ICD-10-CM

## 2025-01-16 RX ORDER — LEFLUNOMIDE 20 MG/1
20 TABLET ORAL DAILY
Qty: 90 TABLET | Refills: 0 | Status: SHIPPED | OUTPATIENT
Start: 2025-01-16 | End: 2025-01-17 | Stop reason: SDUPTHER

## 2025-01-17 ENCOUNTER — PATIENT MESSAGE (OUTPATIENT)
Dept: RHEUMATOLOGY | Facility: CLINIC | Age: 65
End: 2025-01-17
Payer: OTHER GOVERNMENT

## 2025-01-17 RX ORDER — UPADACITINIB 15 MG/1
15 TABLET, EXTENDED RELEASE ORAL DAILY
Qty: 90 TABLET | Refills: 0 | Status: SHIPPED | OUTPATIENT
Start: 2025-01-17

## 2025-01-17 RX ORDER — LEFLUNOMIDE 20 MG/1
20 TABLET ORAL DAILY
Qty: 90 TABLET | Refills: 0 | Status: SHIPPED | OUTPATIENT
Start: 2025-01-17

## 2025-01-17 NOTE — TELEPHONE ENCOUNTER
Spoke with the patient she would like her prescriptions mailed so [facility].  She is scheduled for labs next week.  She needs a follow-up appointment.  We will put her in Feb.

## 2025-01-18 NOTE — PROGRESS NOTES
"Subjective:       Patient ID: Kendra Griffin is a 58 y.o. female.    Vitals:  height is 5' 7" (1.702 m) and weight is 87.1 kg (192 lb). Her temperature is 97 °F (36.1 °C). Her blood pressure is 141/95 (abnormal) and her pulse is 69. Her respiration is 18 and oxygen saturation is 99%.     Chief Complaint: Sinus Problem    This is a 58 y.o. female who presents today with a chief complaint of sinus for about 5 weeks.  Patient previously was seen by ENT and was given amoxicillin.  She reports no relief of symptoms after taking amoxicillin.  She has been using Zyrtec and nasal spray with mild relief.  Complains of worsening sinus pressure pain. Denies any fever or chills.        Sinus Problem   This is a new problem. The current episode started more than 1 month ago. The problem has been gradually worsening since onset. There has been no fever. Associated symptoms include congestion, coughing and sinus pressure. Pertinent negatives include no chills, diaphoresis, ear pain, shortness of breath or sore throat. Past treatments include antibiotics and oral decongestants. The treatment provided no relief.       Constitution: Negative for chills, sweating, fatigue and fever.   HENT: Positive for congestion and sinus pressure. Negative for ear pain, sinus pain, sore throat and voice change.    Neck: Negative for painful lymph nodes.   Eyes: Negative for eye redness.   Respiratory: Positive for cough and sputum production. Negative for chest tightness, bloody sputum, COPD, shortness of breath, stridor, wheezing and asthma.    Gastrointestinal: Negative for nausea and vomiting.   Musculoskeletal: Negative for muscle ache.   Skin: Negative for rash.   Allergic/Immunologic: Negative for seasonal allergies and asthma.   Hematologic/Lymphatic: Negative for swollen lymph nodes.       Objective:      Physical Exam   Constitutional: She is oriented to person, place, and time. She appears well-developed and well-nourished. She is " "[facility]   Consult Note    Patient Name: Javi Martínez  : 1941  MRN: [id number]  Primary Care Physician:  Rani Lopez APRN  Referring Physician: No ref. provider found  Date of admission: 2025    Consults  Subjective   Subjective     Reason for Consult/ Chief Complaint: Urinary tract infection.  Patient complains of weakness fatigue shortness of breath.    Weakness - Generalized  Symptoms include nausea.    Nausea  Symptoms include nausea.      Javi Martínez is a 83 y.o. male patient comes to the emergency room with complaints of weakness fatigue shortness of breath.  He has an indwelling Diamond catheter that has not been changed for 2-1/2 months.  He states that he was taken to the operating room by Dr. Walker who proceeded with visual internal urethrotomy to get a Diamond catheter in him.  He has a history of brachytherapy for prostate cancer and external beam radiation therapy as well.  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 Walker's note.    Review of Systems   Gastrointestinal:  Positive for nausea.   Negative    Personal History     Past Medical History:   Diagnosis Date    Aneurysm     Arthritis     left knee     Atrial fibrillation, persistent     CHB (complete heart block) 2023    CHF exacerbation 2024    Chronic obstructive pulmonary disease 2023    pt denies having this    Difficulty walking     Numbness in right foot    Essential hypertension 2019    GERD (gastroesophageal reflux disease)     HFrEF (heart failure with reduced ejection fraction) 2021    Hyperlipidemia 2021    Paroxysmal atrial fibrillation     Polyneuropathy 2023    Prostate cancer 2010    with seeds implant     Septic joint 2021       Past Surgical History:   Procedure Laterality Date    ABLATION OF DYSRHYTHMIC FOCUS  2013    APPENDECTOMY      ARTERIAL BYPASS SURGERY      CARDIAC ABLATION      x2  \"years ago\"     CARDIAC " ELECTROPHYSIOLOGY PROCEDURE N/A 11/01/2021    Procedure: PPM battery change;  Surgeon: Ge Whelan MD;  Location: [facility] CATH INVASIVE LOCATION;  Service: Cardiovascular;  Laterality: N/A;    CARDIAC SURGERY  2006    cabg 3v    CORONARY ARTERY BYPASS GRAFT  2006    CYSTOSCOPY URETHROTOMY VISUAL INTERNAL N/A 08/15/2024    Procedure: CYSTOSCOPY, URETHRAL DILATION, CATHETER PLACEMENT, RETROGRADE URETHROGRAM;  Surgeon: August Myers MD;  Location: [facility] MAIN OR;  Service: Urology;  Laterality: N/A;    CYSTOSCOPY URETHROTOMY VISUAL INTERNAL N/A 10/24/2024    Procedure: CYSTOSCOPY URETHROTOMY;  Surgeon: Mally Bernal MD;  Location: [facility] MAIN OR;  Service: Urology;  Laterality: N/A;    ENDOSCOPY      with dilation     KNEE ARTHROSCOPY Left     TOTAL KNEE ARTHROPLASTY Left 07/23/2021    Procedure: TOTAL KNEE ARTHROPLASTY WITH DORA NAVIGATION WITH BIOMET;  Surgeon: Carlitos Zhao MD;  Location: [facility] MAIN OR;  Service: Orthopedics;  Laterality: Left;    VENTRICULAR CARDIAC PACEMAKER INSERTION      medtronic        Family History: family history includes Heart attack in his mother; No Known Problems in his brother, father, maternal aunt, maternal grandfather, maternal grandmother, maternal uncle, paternal aunt, paternal grandfather, paternal grandmother, paternal uncle, sister, and another family member. Otherwise pertinent FHx was reviewed and not pertinent to current issue.    Social History:  reports that he quit smoking about 29 years ago. His smoking use included cigarettes. He started smoking about 63 years ago. He has a 16.8 pack-year smoking history. He has never been exposed to tobacco smoke. He has never used smokeless tobacco. He reports that he does not drink alcohol and does not use drugs.    Home Medications:   LORazepam, Melatonin, allopurinol, apixaban, empagliflozin, furosemide, omeprazole, potassium chloride, tamsulosin, and valsartan    Allergies:  No Known Allergies    Objective   cooperative.  Non-toxic appearance. She does not appear ill. No distress.   HENT:   Head: Normocephalic and atraumatic.   Right Ear: Hearing, external ear and ear canal normal. Tympanic membrane is bulging.   Left Ear: Hearing, external ear and ear canal normal. Tympanic membrane is bulging.   Nose: No mucosal edema, rhinorrhea or nasal deformity. No epistaxis. Right sinus exhibits maxillary sinus tenderness and frontal sinus tenderness. Left sinus exhibits maxillary sinus tenderness and frontal sinus tenderness.   Mouth/Throat: Uvula is midline and mucous membranes are normal. No trismus in the jaw. Normal dentition. No uvula swelling. Posterior oropharyngeal erythema (cobblestone apperance) present. Tonsils are 1+ on the right. Tonsils are 1+ on the left. No tonsillar exudate.   Eyes: Conjunctivae and lids are normal. No scleral icterus.   Sclera clear bilat   Neck: Trachea normal, full passive range of motion without pain and phonation normal. Neck supple.   Cardiovascular: Normal rate, regular rhythm, normal heart sounds, intact distal pulses and normal pulses.   Pulmonary/Chest: Effort normal and breath sounds normal. No respiratory distress.   Abdominal: Soft. Normal appearance and bowel sounds are normal. She exhibits no distension. There is no tenderness.   Musculoskeletal: Normal range of motion. She exhibits no edema or deformity.   Neurological: She is alert and oriented to person, place, and time. She exhibits normal muscle tone. Coordination normal.   Skin: Skin is warm, dry and intact. She is not diaphoretic. No pallor.   Psychiatric: She has a normal mood and affect. Her speech is normal and behavior is normal. Judgment and thought content normal. Cognition and memory are normal.   Nursing note and vitals reviewed.      Assessment:       1. Acute bacterial sinusitis        Plan:         Acute bacterial sinusitis  -     methylPREDNISolone (MEDROL DOSEPACK) 4 mg tablet; Take 1 tablet (4 mg total) by    Objective     Vitals:  Temp:  [97.7 °F (36.5 °C)] 97.7 °F (36.5 °C)  Heart Rate:  [73-76] 76  Resp:  [20] 20  BP: (112-119)/(72-95) 117/81    Physical Exam  Awake alert oriented  Afebrile vital signs are stable  Abdomen soft nontender nondistended no rebound guarding or rigidity noted  Normal penis normal scrotum   Normal perineum no abnormalities noted  Extremities no clubbing cyanosis or edema  Result Review    Result Review:  I have personally reviewed the results from the time of this admission to 1/18/2025 14:15 EST and agree with these findings:  [x]  Laboratory list / accordion  []  Microbiology  []  Radiology  []  EKG/Telemetry   []  Cardiology/Vascular   []  Pathology  []  Old records  []  Other:  Most notable findings include: Creatinine is 1.88 white blood cell count is 9.15      Assessment & Plan   Assessment / Plan     Brief Patient Summary:  Javi Martínez is a 83 y.o. male who patient with indwelling Diamond catheter that has not been changed for 2-1/2 months he comes in with urinary tract infection.    Active Hospital Problems:  Active Hospital Problems    Diagnosis     **Acute exacerbation of CHF (congestive heart failure)      Plan:   In the ER I removed the old Diamond catheter there was a foul purulent odor that was coming from the catheter and his urethra I then went ahead and placed a 16 [place] coudé Diamond catheter into the bladder and the irrigated with saline and it irrigated fine.  My recommendation is to treat his a urinary tract infection and that he can follow-up with Dr. Walker for evaluation of his urethral stricture disease.    Donn Garcia MD     mouth as needed (Take as directed). Take as directed  Dispense: 1 Package; Refill: 0  -     doxycycline (VIBRAMYCIN) 100 MG Cap; Take 1 capsule (100 mg total) by mouth 2 (two) times daily. for 10 days  Dispense: 20 capsule; Refill: 0      Patient Instructions   Sinusitis (Antibiotic Treatment)    The sinuses are air-filled spaces within the bones of the face. They connect to the inside of the nose. Sinusitis is an inflammation of the tissue lining the sinus cavity. Sinus inflammation can occur during a cold. It can also be due to allergies to pollens and other particles in the air. Sinusitis can cause symptoms of sinus congestion and fullness. A sinus infection causes fever, headache and facial pain. There is often green or yellow drainage from the nose or into the back of the throat (post-nasal drip). You have been given antibiotics to treat this condition.  Home care:  · Take the full course of antibiotics as instructed. Do not stop taking them, even if you feel better.  · Drink plenty of water, hot tea, and other liquids. This may help thin mucus. It also may promote sinus drainage.  · Heat may help soothe painful areas of the face. Use a towel soaked in hot water. Or,  the shower and direct the hot spray onto your face. Using a vaporizer along with a menthol rub at night may also help.   · An expectorant containing guaifenesin may help thin the mucus and promote drainage from the sinuses.  · Over-the-counter decongestants may be used unless a similar medicine was prescribed. Nasal sprays work the fastest. Use one that contains phenylephrine or oxymetazoline. First blow the nose gently. Then use the spray. Do not use these medicines more often than directed on the label or symptoms may get worse. You may also use tablets containing pseudoephedrine. Avoid products that combine ingredients, because side effects may be increased. Read labels. You can also ask the pharmacist for help. (NOTE: Persons with high  blood pressure should not use decongestants. They can raise blood pressure.)  · Over-the-counter antihistamines may help if allergies contributed to your sinusitis.    · Do not use nasal rinses or irrigation during an acute sinus infection, unless told to by your health care provider. Rinsing may spread the infection to other sinuses.  · Use acetaminophen or ibuprofen to control pain, unless another pain medicine was prescribed. (If you have chronic liver or kidney disease or ever had a stomach ulcer, talk with your doctor before using these medicines. Aspirin should never be used in anyone under 18 years of age who is ill with a fever. It may cause severe liver damage.)  · Don't smoke. This can worsen symptoms.  Follow-up care  Follow up with your healthcare provider or our staff if you are not improving within the next week.  When to seek medical advice  Call your healthcare provider if any of these occur:  · Facial pain or headache becoming more severe  · Stiff neck  · Unusual drowsiness or confusion  · Swelling of the forehead or eyelids  · Vision problems, including blurred or double vision  · Fever of 100.4ºF (38ºC) or higher, or as directed by your healthcare provider  · Seizure  · Breathing problems  · Symptoms not resolving within 10 days  Date Last Reviewed: 4/13/2015  © 2696-2874 [employer]. [address] [place] [address], [place] [address]. All rights reserved. This information is not intended as a substitute for professional medical care. Always follow your healthcare professional's instructions.                                                                    Sinusitis   If your condition worsens or fails to improve we recommend that you receive another evaluation at the ER immediately or contact your PCP to discuss your concerns or return here. You must understand that you've received an urgent care treatment only and that you may be released before all your medical problems are known or  "treated. You the patient will arrange for followup care as instructed.   If we discussed that I think your illness is viral it will not respond to antibiotics and it will last 10-14 days. However, if over the next few days the symptoms worsen start the antibiotics I have given you.   If we discussed that you require antibiotics start them now and take them to completion.   If you are female and on BCP and do take the antibiotics, use additional methods to prevent pregnancy while on the antibiotics and for one cycle after.   Flonase (fluticasone) is a nasal spray which is available over the counter and may help with your symptoms   Zyrtec D, Claritin D or allegra D can also help with symptoms of congestion and drainage.   If you have hypertension avoid using the "D" which is the decongestant   If you just have drainage you can take plain zyrtec, claritin or allegra   If you just have a congested feeling you can take pseudoephedrine (unless you have high blood pressure) which you have to sign for behind the counter. Do not buy the phenylephrine which is on the shelf as it is not effective   Rest and fluids are also important.   Tylenol or ibuprofen can also be used as directed for pain unless you have an allergy to them or medical condition such as stomach ulcers, kidney or liver disease or blood thinners etc for which you should not be taking these type of medications.   If you are flying in the next few days Afrin nose drops for the airplane flight upon take off and landing may help. Other than at those times refrain from using afrin.   If you were prescribed a narcotic do not drive or operate heavy machinery while taking these medications.     -Please take antibiotic to completion.  -Below are suggestions for symptomatic relief:              -Tylenol every 4 hours OR ibuprofen every 6 hours as needed for pain/fever.              -Salt water gargles to soothe throat pain.              -Chloroseptic spray also helps " to numb throat pain.              -Nasal saline spray reduces inflammation and dryness.              -Warm face compresses to help with facial sinus pain/pressure.              -Vicks vapor rub at night.              -Flonase OTC or Nasacort OTC for nasal congestion.              -Simple foods like chicken noodle soup.              -Delsym helps with coughing at night              -Zyrtec/Claritin during the day & Benadryl at night may help with allergies.    -Flonase daily.  -Zyrtec daily.  -Medrol dose pack.  -10 days of antibiotics.  Please follow up with your Primary care provider within 2-5 days if your signs and symptoms have not resolved or worsen.     If your condition worsens or fails to improve we recommend that you receive another evaluation at the emergency room immediately or contact your primary medical clinic to discuss your concerns.   You must understand that you have received an Urgent Care treatment only and that you may be released before all of your medical problems are known or treated. You, the patient, will arrange for follow up care as instructed.

## 2025-01-24 ENCOUNTER — PATIENT MESSAGE (OUTPATIENT)
Dept: RHEUMATOLOGY | Facility: CLINIC | Age: 65
End: 2025-01-24
Payer: OTHER GOVERNMENT

## 2025-02-04 ENCOUNTER — OFFICE VISIT (OUTPATIENT)
Dept: RHEUMATOLOGY | Facility: CLINIC | Age: 65
End: 2025-02-04
Payer: OTHER GOVERNMENT

## 2025-02-04 VITALS
HEART RATE: 84 BPM | HEIGHT: 67 IN | WEIGHT: 220.25 LBS | BODY MASS INDEX: 34.57 KG/M2 | DIASTOLIC BLOOD PRESSURE: 87 MMHG | SYSTOLIC BLOOD PRESSURE: 135 MMHG

## 2025-02-04 DIAGNOSIS — R53.83 FATIGUE, UNSPECIFIED TYPE: ICD-10-CM

## 2025-02-04 DIAGNOSIS — M05.79 RHEUMATOID ARTHRITIS INVOLVING MULTIPLE SITES WITH POSITIVE RHEUMATOID FACTOR: ICD-10-CM

## 2025-02-04 DIAGNOSIS — D84.9 IMMUNOSUPPRESSION: ICD-10-CM

## 2025-02-04 PROCEDURE — 96372 THER/PROPH/DIAG INJ SC/IM: CPT | Mod: PBBFAC

## 2025-02-04 PROCEDURE — 99215 OFFICE O/P EST HI 40 MIN: CPT | Mod: PBBFAC | Performed by: INTERNAL MEDICINE

## 2025-02-04 PROCEDURE — 99214 OFFICE O/P EST MOD 30 MIN: CPT | Mod: S$PBB,,, | Performed by: INTERNAL MEDICINE

## 2025-02-04 PROCEDURE — 99999 PR PBB SHADOW E&M-EST. PATIENT-LVL V: CPT | Mod: PBBFAC,,, | Performed by: INTERNAL MEDICINE

## 2025-02-04 PROCEDURE — 99999PBSHW PR PBB SHADOW TECHNICAL ONLY FILED TO HB: Mod: PBBFAC,,,

## 2025-02-04 RX ORDER — TRIAMCINOLONE ACETONIDE 40 MG/ML
80 INJECTION, SUSPENSION INTRA-ARTICULAR; INTRAMUSCULAR ONCE
Status: COMPLETED | OUTPATIENT
Start: 2025-02-04 | End: 2025-02-04

## 2025-02-04 RX ORDER — UPADACITINIB 15 MG/1
15 TABLET, EXTENDED RELEASE ORAL DAILY
Qty: 90 TABLET | Refills: 1 | Status: SHIPPED | OUTPATIENT
Start: 2025-02-04

## 2025-02-04 RX ORDER — LEFLUNOMIDE 20 MG/1
20 TABLET ORAL DAILY
Qty: 90 TABLET | Refills: 1 | Status: SHIPPED | OUTPATIENT
Start: 2025-02-04

## 2025-02-04 RX ADMIN — TRIAMCINOLONE ACETONIDE 80 MG: 40 INJECTION, SUSPENSION INTRA-ARTICULAR; INTRAMUSCULAR at 01:02

## 2025-02-04 NOTE — PROGRESS NOTES
2/3/2025     6:27 PM   Rapid3 Question Responses and Scores   MDHAQ Score 1.6   Psychologic Score 5.5   Pain Score 4   When you awakened in the morning OVER THE LAST WEEK, did you feel stiff? Yes   If Yes, please indicate the number of hours until you are as limber as you will be for the day 5   Fatigue Score 8   Global Health Score 6   RAPID3 Score 5.11     Answers submitted by the patient for this visit:  Rheumatology Questionnaire (Submitted on 2/3/2025)  fever: No  eye redness: Yes  mouth sores: No  headaches: Yes  shortness of breath: No  chest pain: No  trouble swallowing: No  diarrhea: No  constipation: No  unexpected weight change: No  genital sore: No  dysuria: No  During the last 3 days, have you had a skin rash?: No  Bruises or bleeds easily: No  cough: No

## 2025-02-04 NOTE — PROGRESS NOTES
"Per order, patient to receive 80 mg of Kenalog (triamcinolone acetonide). On hand, Kenalog 40mg/mL. The area of injection was palpated using the medial fold and the iliac crest as anatomical landmarks. Patient was advised to relax the muscle. The area was cleaned with alcohol and allowed to dry. Using a 25g 1.5" needle, 2 mL of Kenalog (triamcinolone acetonide) was placed intramuscularly into the right upper outer gluteal quadrant. Patient instructed to wait 15 minutes after the injection.  Patient experienced no complications and was discharged in stable condition. Lot: [id number]  Exp: 4/30/25     "

## 2025-02-04 NOTE — PROGRESS NOTES
Chief Complaint: Rheumatoid Arthritis    HPI:  Kendra Griffin is a 61 y.o. female with HTN, elevated cholesterol, anemia, and GERD sent in consultation by Dr. Armando Knox for RA.  Since July 2019 had multiple joint pain and decrease mobility.  RF and CCP were found to be positive.    She has seen ID and completed vaccinations except for one more vaccination due April 2020.  She is not currently on prednisone since it made it difficult for her to sleep.   She does not like the side effects     Stopped MTX 2 due to stomach upset.    Failed Humira.  Enbrel tried for one month but [facility] after.   Xeljanz given after Enbrel.      Interval History (2/4/2025):    Switched from Xeljanz to Rinvoq which helped.  [facility] to [facility].     Compliant with leflunomide 20 mg daily.  Chronic morning stiffness.  Chronic sinus symptoms but no fever or green mucus.     6/10 ache hands.  Swelling daily in hands and knees.  Pain was 3/10 on rinvoq.   Worse with any activity.   Nothing improves pain.    May 2023 [facility] but made her sick for 4 days so her doctor took 2nd Reclast 2024 but did not feel as bad but felt flu-like.        Problem List:    1.  Rheumatoid arthritis.  Manifested by joint pain, positive RF/CCP/SKYLA, elevated CRP.  Active off leflunomide and Rinvoq.  2.  Hypertension.    3.  Hypercholesterolemia  4.  Anemia  5.  GERD with esophagitis  6.  Chronic back pain   7.  History of murmur.  Evaluated by cardiology and no significant issue found.  8.  Abnormal stress echo. Follow with primary.   9.  Burning and itching with Humira  10. Osteoporosis.  Told she will need teeth pulled inorder to receive osteoporosis medicine.  Now [facility] and [facility]ast           Review of Systems   Constitutional: Positive for unexpected weight change (gaining weight). Negative for fever.   HENT: Negative for trouble swallowing.     Eyes: Negative for redness.    Respiratory: Negative for cough and shortness of breath.    Cardiovascular: Negative for chest pain.   Gastrointestinal: Negative for constipation and diarrhea.   Genitourinary: Negative for dysuria and genital sores.   Musculoskeletal: Positive for arthralgias.   Skin: Negative for rash.   Neurological: Negative for headaches.  Dizziness  Hematological: Does not bruise/bleed easily.         Objective:        Physical Exam   Constitutional: She is oriented to person, place, and time and well-developed, well-nourished, and in no distress.   HENT:   Head: Normocephalic and atraumatic.   Eyes: Conjunctivae and EOM are normal.   Neurological: She is alert and oriented to person, place, and time.   Psychiatric: Mood and affect normal.   CV:  RRR no murmur  LUNGS: CTA b/l   ABD:  No tenderness +BS  Musculoskeletal: 28 joint count: 2 swollen (2nd MCP bilateral) and 16 tender (all MCPs 2nd and 3rd PIP bilateral and shoulders)   No pain on compression of MTPs           LABS    Component      Latest Ref Rn 1/24/2025   WBC      3.90 - 12.70 K/uL 5.03    RBC      4.00 - 5.40 M/uL 4.50    Hemoglobin      12.0 - 16.0 g/dL 10.7 (L)    Hematocrit      37.0 - 48.5 % 35.7 (L)    MCV      82 - 98 fL 79 (L)    MCH      27.0 - 31.0 pg 23.8 (L)    MCHC      32.0 - 36.0 g/dL 30.0 (L)    RDW      11.5 - 14.5 % 16.0 (H)    Platelet Count      150 - 450 K/uL 270    MPV      9.2 - 12.9 fL 9.0 (L)    Immature Granulocytes      0.0 - 0.5 % 0.4    Gran # (ANC)      1.8 - 7.7 K/uL 3.2    Immature Grans (Abs)      0.00 - 0.04 K/uL 0.02    Lymph #      1.0 - 4.8 K/uL 1.3    Mono #      0.3 - 1.0 K/uL 0.4    Eos #      0.0 - 0.5 K/uL 0.1    Baso #      0.00 - 0.20 K/uL 0.02    nRBC      0 /100 WBC 0    Gran %      38.0 - 73.0 % 63.0    Lymph %      18.0 - 48.0 % 25.8    Mono %      4.0 - 15.0 % 8.2    Eos %      0.0 - 8.0 % 2.6    Basophil %      0.0 - 1.9 % 0.4    Differential Method Automated    Sodium      136 - 145 mmol/L 141    Potassium       3.5 - 5.1 mmol/L 4.1    Chloride      95 - 110 mmol/L 108    CO2      23 - 29 mmol/L 25    Glucose      70 - 110 mg/dL 98    BUN      8 - 23 mg/dL 18    Creatinine      0.5 - 1.4 mg/dL 0.9    Calcium      8.7 - 10.5 mg/dL 9.8    PROTEIN TOTAL      6.0 - 8.4 g/dL 7.3    Albumin      3.5 - 5.2 g/dL 3.9    BILIRUBIN TOTAL      0.1 - 1.0 mg/dL 0.4    ALP      40 - 150 U/L 83    AST      10 - 40 U/L 16    ALT      10 - 44 U/L 19    eGFR      >60 mL/min/1.73 m^2 >60.0    Anion Gap      8 - 16 mmol/L 8    Cholesterol Total      120 - 199 mg/dL 198    Triglycerides      30 - 150 mg/dL 138    HDL      40 - 75 mg/dL 68    LDL Cholesterol      63.0 - 159.0 mg/dL 102.4    HDL/Cholesterol Ratio      20.0 - 50.0 % 34.3    Total Cholesterol/HDL Ratio      2.0 - 5.0  2.9    Non-HDL Cholesterol      mg/dL 130    Sed Rate      0 - 20 mm/Hr 9    CRP      0.0 - 8.2 mg/L 1.6       Legend:  (L) Low  (H) High   Legend:  (L) Low  (H) High        Assessment:       1.  Rheumatoid arthritis.  Manifested by joint pain, positive RF/CCP/SKYLA, elevated CRP.  Active off leflunomide and Rinvoq.  2.  Immunosuppression due to drug.    3.  Sinus issues Hypercholesterolemia  4.   Osteoporosis.   [facility] and [facility]    Plan:       Kenalog 80 mg IM for RA flare. Labs.  Patient would like to restart Rinvoq 15 mg daily and leflunomide 20 mg daily (printed prescription given.  Patient continues to require disability.   2.   Labs  3.   Patient to [facility] with [facility]/ [facility]  4.   [facility] at [facility]      RTO in 6 months/prn
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
## 2025-04-29 DIAGNOSIS — Z12.31 ENCOUNTER FOR SCREENING MAMMOGRAM FOR MALIGNANT NEOPLASM OF BREAST: Primary | ICD-10-CM

## 2025-05-02 ENCOUNTER — HOSPITAL ENCOUNTER (OUTPATIENT)
Dept: RADIOLOGY | Facility: HOSPITAL | Age: 65
Discharge: HOME | End: 2025-05-02
Attending: PHYSICIAN ASSISTANT
Payer: OTHER GOVERNMENT

## 2025-05-02 DIAGNOSIS — Z12.31 ENCOUNTER FOR SCREENING MAMMOGRAM FOR MALIGNANT NEOPLASM OF BREAST: ICD-10-CM

## 2025-05-02 PROCEDURE — 77063 BREAST TOMOSYNTHESIS BI: CPT | Mod: 26,,, | Performed by: RADIOLOGY

## 2025-05-02 PROCEDURE — 77067 SCR MAMMO BI INCL CAD: CPT | Mod: 26,,, | Performed by: RADIOLOGY

## 2025-05-02 PROCEDURE — 77067 SCR MAMMO BI INCL CAD: CPT | Mod: TC,PO

## 2025-05-05 ENCOUNTER — PATIENT MESSAGE (OUTPATIENT)
Dept: RHEUMATOLOGY | Facility: CLINIC | Age: 65
End: 2025-05-05
Payer: OTHER GOVERNMENT

## 2025-05-23 NOTE — TELEPHONE ENCOUNTER
Occupational Therapy    OT Treatment    Patient Name: Davidson Khoury  MRN: [id number]  Department: [facility]  Room: 05/05-A  Today's Date: 5/23/2025  Time Calculation  Start Time: 1452  Stop Time: 1523  Time Calculation (min): 31 min        Assessment:  OT Assessment: Pt continues to present with deficits in ADLs, IADLs, functional mobility, bed mobility and functional transfers. Pt is progressing towards goals evidenced by ability to tolerate EOB x20 minutes, face washing using RUE and bed mobility. Pt continues to be appropriate for HIGH intensity skilled OT to return to highest PLOF safely.  Prognosis: Good  Barriers to Discharge Home: Caregiver assistance, Cognition needs, Physical needs  Caregiver Assistance: Caregiver assistance needed per identified barriers - however, level of patient's required assistance exceeds assistance available at home  Cognition Needs: 24hr supervision for safety awareness needed, Cognition-related high falls risk, Insight of patient limited regarding functional ability/needs  Physical Needs: 24hr mobility assistance needed, 24hr ADL assistance needed, High falls risk due to function or environment  Evaluation/Treatment Tolerance: Patient limited by fatigue  Medical Staff Made Aware: Yes  End of Session Communication: Bedside nurse  End of Session Patient Position: Bed, 3 rail up, Alarm off, not on at start of session  Prognosis: Good  Evaluation/Treatment Tolerance: Patient limited by fatigue  Medical Staff Made Aware: Yes  Plan:  Treatment Interventions: ADL retraining, UE strengthening/ROM, Compensatory technique education  OT Frequency: 4 times per week  OT Discharge Recommendations: High intensity level of continued care  Equipment Recommended upon Discharge:  (TBD)  OT Recommended Transfer Status: Dependent  OT - OK to Discharge: Yes  Treatment Interventions: ADL retraining, UE strengthening/ROM, Compensatory technique education    Subjective   OT Visit Info:  OT Received On:  Pt voicemail has not been set up.      Thank you     05/23/25  General Visit Info:  General  Reason for Referral: P/w AMS, HA, HTN with notable subtle left sided weakness that worsened. Acute cerebrovascular accident due to embolism of R MCA. Proximal R superior M2 occulsion, MT TICI 2b. Pre intervention NIHSS: 10, post intervention NIHSS: 9.  Past Medical History Relevant to Rehab: h/o renal transplant (currently on Cellcept) and HTN  Family/Caregiver Present: No  Prior to Session Communication: Bedside nurse  Patient Position Received: Bed, 3 rail up, Alarm off, not on at start of session  Preferred Learning Style: auditory, verbal, visual  General Comment: Pt pleasant and cooperative during session. Pt was very eager and excited to work with therapy.  Precautions:  Medical Precautions: Fall precautions  Precautions Comment: SBP <180     Date/Time Vitals Session Patient Position Pulse Resp SpO2 BP MAP (mmHg)    05/23/25 1452 Pre OT  Lying  74  22  100 %  156/102  115     05/23/25 1523 Post OT  Lying  73  18  100 %  148/105  118                 Pain:  Pain Assessment  Pain Assessment: 0-10  0-10 (Numeric) Pain Score: 0 - No pain    Objective    Cognition:  Cognition  Overall Cognitive Status: Impaired  Orientation Level: Oriented X4  Following Commands: Follows one step commands with repetition (50-75%)  Insight: Moderate (Required mod VC's for UE positioning and not pushing through RUE to maintain midline posture)  Impulsive: Mildly  Coordination:     Activities of Daily Living: Grooming  Grooming Level of Assistance: Minimum assistance  Grooming Where Assessed: Edge of bed  Grooming Comments: Min A for obtaining wipe to wipe face and VC's for tube safety and initiation    UE Bathing  UE Bathing Where Assessed: Edge of bed  UE Bathing Comments: Pt was able to wash LUE using R hand with Mod VC's and Min A for completion and body awareness. Pt was unable to complete RUE UE bathing due to deficits in LUE.  Functional Standing Tolerance:     Bed Mobility/Transfers: Bed  Mobility  Bed Mobility: Yes  Bed Mobility 1  Bed Mobility 1: Supine to sitting  Level of Assistance 1: Maximum assistance (x2)  Bed Mobility Comments 1: Min VC's to move RLE towards side of bed to facilitate EOB sitting. Max A x2 for positioning of hips and BLE.  Bed Mobility 2  Bed Mobility  2: Sitting to supine  Level of Assistance 2: Maximum assistance (x2)         Sitting Balance:  Static Sitting Balance  Static Sitting-Balance Support: Feet supported  Static Sitting-Level of Assistance: Maximum assistance, Dependent  Static Sitting-Comment/Number of Minutes: x20 minutes. Max A when looking into mirror and aligned at midline. Dep when head was leaning laterally to the right. Max VC's required to keep head aligned and to not push through RUE to reduce left lateral lean.       Therapeutic Activity  Therapeutic Activity Performed: Yes  Therapeutic Activity 1: EOB x20 minutes Max A/Dep    Outcome Measures:[facility] Daily Activity  Putting on and taking off regular lower body clothing: Total  Bathing (including washing, rinsing, drying): A lot  Putting on and taking off regular upper body clothing: Total  Toileting, which includes using toilet, bedpan or urinal: Total  Taking care of personal grooming such as brushing teeth: A lot  Eating Meals: A little  Daily Activity - Total Score: 10        , Confusion Assessment Method-ICU (CAM-ICU)  Feature 1: Acute Onset or Fluctuating Course: Negative  Feature 2: Inattention: Negative  Feature 4: Disorganized Thinking: Negative  Overall CAM-ICU: Negative  , and Early Mobility/Exercise Safety Screen: Proceed with mobilization - No exclusion criteria met  ICU Mobility Scale: Sitting over edge of bed [3]    Education Documentation  Body Mechanics, taught by NANCY Novak at 5/23/2025  3:57 PM.  Learner: Patient  Readiness: Acceptance  Method: Explanation  Response: Needs Reinforcement  Comment: OT POC    Precautions, taught by NANCY Novak at 5/23/2025  3:57  PM.  Learner: Patient  Readiness: Acceptance  Method: Explanation  Response: Needs Reinforcement  Comment: OT POC    ADL Training, taught by NANCY Novak at 5/23/2025  3:57 PM.  Learner: Patient  Readiness: Acceptance  Method: Explanation  Response: Needs Reinforcement  Comment: OT POC    Education Comments  No comments found.        OP EDUCATION:       Goals:  Encounter Problems       Encounter Problems (Active)       ADLs       Patient with complete upper body dressing with moderate assist level of assistance donning and doffing all UE clothes with PRN adaptive equipment and one handed techniques while edge of bed.  (Progressing)       Start:  05/21/25    Expected End:  06/11/25            Patient will feed self with minimal assist level of assistance and verbal cues and tactile cues using PRN adaptive equipment. (Progressing)       Start:  05/21/25    Expected End:  06/11/25            Patient will complete daily grooming tasks brushing teeth and washing face/hair with minimal assist level of assistance and PRN adaptive equipment while edge of bed. (Progressing)       Start:  05/21/25    Expected End:  06/11/25               BALANCE       Patient will maintain static standing balance during ADL task with minimal assist level of assistance in order to demonstrate decreased risk of falling and improved postural control. (Not Progressing)       Start:  05/21/25    Expected End:  06/11/25               EXERCISE/STRENGTHENING       Patient will independently perform self passive ROM on LUE to increase bimanual coordination. (Progressing)       Start:  05/21/25    Expected End:  06/11/25               TRANSFERS       Patient will perform bed mobility minimal assist level of assistance in order to improve safety and independence with mobility (Progressing)       Start:  05/21/25    Expected End:  06/11/25                   Completion of this session, clinical decision making, and documentation performed under the  supervision/direction of [place] DAWN/GREG

## 2025-06-17 DIAGNOSIS — D84.9 IMMUNOSUPPRESSION: ICD-10-CM

## 2025-06-17 DIAGNOSIS — M05.79 RHEUMATOID ARTHRITIS INVOLVING MULTIPLE SITES WITH POSITIVE RHEUMATOID FACTOR: ICD-10-CM

## 2025-06-17 DIAGNOSIS — R53.83 FATIGUE, UNSPECIFIED TYPE: ICD-10-CM

## 2025-06-17 RX ORDER — LEFLUNOMIDE 20 MG/1
20 TABLET ORAL DAILY
Qty: 90 TABLET | Refills: 1 | Status: SHIPPED | OUTPATIENT
Start: 2025-06-17

## 2025-07-11 NOTE — PATIENT INSTRUCTIONS
Heat for 20 minutes once or twice a day.     Stop the aleve and use the meloxicam as needed    Stop the lisinopril we are changing to losartan.        (4) no impairment

## 2025-08-29 DIAGNOSIS — M05.79 RHEUMATOID ARTHRITIS INVOLVING MULTIPLE SITES WITH POSITIVE RHEUMATOID FACTOR: ICD-10-CM

## 2025-08-29 DIAGNOSIS — D84.9 IMMUNOSUPPRESSION: ICD-10-CM

## 2025-08-29 DIAGNOSIS — R53.83 FATIGUE, UNSPECIFIED TYPE: ICD-10-CM

## 2025-09-02 RX ORDER — LEFLUNOMIDE 20 MG/1
20 TABLET ORAL DAILY
Qty: 90 TABLET | Refills: 0 | Status: SHIPPED | OUTPATIENT
Start: 2025-09-02 | End: 2025-09-02 | Stop reason: SDUPTHER

## 2025-09-02 RX ORDER — LEFLUNOMIDE 20 MG/1
20 TABLET ORAL DAILY
Qty: 90 TABLET | Refills: 0 | Status: SHIPPED | OUTPATIENT
Start: 2025-09-02

## 2025-09-02 RX ORDER — UPADACITINIB 15 MG/1
15 TABLET, EXTENDED RELEASE ORAL DAILY
Qty: 90 TABLET | Refills: 0 | Status: SHIPPED | OUTPATIENT
Start: 2025-09-02 | End: 2025-09-02 | Stop reason: SDUPTHER

## 2025-09-02 RX ORDER — UPADACITINIB 15 MG/1
15 TABLET, EXTENDED RELEASE ORAL DAILY
Qty: 90 TABLET | Refills: 0 | Status: SHIPPED | OUTPATIENT
Start: 2025-09-02

## 2025-09-03 ENCOUNTER — PATIENT MESSAGE (OUTPATIENT)
Dept: RHEUMATOLOGY | Facility: CLINIC | Age: 65
End: 2025-09-03
Payer: OTHER GOVERNMENT